# Patient Record
Sex: MALE | Race: WHITE | Employment: OTHER | URBAN - METROPOLITAN AREA
[De-identification: names, ages, dates, MRNs, and addresses within clinical notes are randomized per-mention and may not be internally consistent; named-entity substitution may affect disease eponyms.]

---

## 2019-06-07 ENCOUNTER — PREP FOR PROCEDURE (OUTPATIENT)
Dept: SURGERY | Age: 69
End: 2019-06-07

## 2019-06-07 RX ORDER — SODIUM CHLORIDE 9 MG/ML
INJECTION, SOLUTION INTRAVENOUS CONTINUOUS
Status: CANCELLED | OUTPATIENT
Start: 2019-06-07

## 2019-06-07 RX ORDER — SODIUM CHLORIDE 0.9 % (FLUSH) 0.9 %
10 SYRINGE (ML) INJECTION EVERY 12 HOURS SCHEDULED
Status: CANCELLED | OUTPATIENT
Start: 2019-06-07

## 2019-06-10 ENCOUNTER — HOSPITAL ENCOUNTER (OUTPATIENT)
Age: 69
Setting detail: OUTPATIENT SURGERY
Discharge: HOME | End: 2019-06-10
Attending: SURGERY | Admitting: SURGERY
Payer: MEDICARE

## 2019-06-10 VITALS
SYSTOLIC BLOOD PRESSURE: 115 MMHG | OXYGEN SATURATION: 97 % | HEART RATE: 57 BPM | WEIGHT: 219 LBS | TEMPERATURE: 98 F | HEIGHT: 71 IN | RESPIRATION RATE: 16 BRPM | DIASTOLIC BLOOD PRESSURE: 62 MMHG | BODY MASS INDEX: 30.66 KG/M2

## 2019-06-10 LAB
HCT VFR BLD CALC: 39.1 % (ref 37–54)
HEMOGLOBIN: 13.6 G/DL (ref 12.5–16.5)
IMMATURE RETIC FRACT: 6.5 % (ref 2.3–13.4)
IRON SATURATION: 33 % (ref 20–55)
IRON: 106 MCG/DL (ref 59–158)
MCH RBC QN AUTO: 32.5 PG (ref 26–35)
MCHC RBC AUTO-ENTMCNC: 34.8 % (ref 32–34.5)
MCV RBC AUTO: 93.3 FL (ref 80–99.9)
PDW BLD-RTO: 13.2 FL (ref 11.5–15)
PLATELET # BLD: 212 E9/L (ref 130–450)
PMV BLD AUTO: 10.2 FL (ref 7–12)
RBC # BLD: 4.19 E12/L (ref 3.8–5.8)
RETIC HGB EQUIVALENT: 38.1 PG (ref 28.2–36.6)
RETICULOCYTE ABSOLUTE COUNT: 0.07 E12/L
RETICULOCYTE COUNT PCT: 1.6 % (ref 0.4–1.9)
TOTAL IRON BINDING CAPACITY: 325 MCG/DL (ref 250–450)
WBC # BLD: 6.7 E9/L (ref 4.5–11.5)

## 2019-06-10 PROCEDURE — 83540 ASSAY OF IRON: CPT

## 2019-06-10 PROCEDURE — 6360000002 HC RX W HCPCS: Performed by: SURGERY

## 2019-06-10 PROCEDURE — 99152 MOD SED SAME PHYS/QHP 5/>YRS: CPT | Performed by: SURGERY

## 2019-06-10 PROCEDURE — 99153 MOD SED SAME PHYS/QHP EA: CPT | Performed by: SURGERY

## 2019-06-10 PROCEDURE — 7100000011 HC PHASE II RECOVERY - ADDTL 15 MIN: Performed by: SURGERY

## 2019-06-10 PROCEDURE — 85045 AUTOMATED RETICULOCYTE COUNT: CPT

## 2019-06-10 PROCEDURE — 2580000003 HC RX 258: Performed by: SURGERY

## 2019-06-10 PROCEDURE — 2709999900 HC NON-CHARGEABLE SUPPLY: Performed by: SURGERY

## 2019-06-10 PROCEDURE — 85027 COMPLETE CBC AUTOMATED: CPT

## 2019-06-10 PROCEDURE — 83550 IRON BINDING TEST: CPT

## 2019-06-10 PROCEDURE — 3609012400 HC EGD TRANSORAL BIOPSY SINGLE/MULTIPLE: Performed by: SURGERY

## 2019-06-10 PROCEDURE — 36415 COLL VENOUS BLD VENIPUNCTURE: CPT

## 2019-06-10 PROCEDURE — 88305 TISSUE EXAM BY PATHOLOGIST: CPT

## 2019-06-10 PROCEDURE — 7100000010 HC PHASE II RECOVERY - FIRST 15 MIN: Performed by: SURGERY

## 2019-06-10 PROCEDURE — 88342 IMHCHEM/IMCYTCHM 1ST ANTB: CPT

## 2019-06-10 RX ORDER — MIDAZOLAM HYDROCHLORIDE 1 MG/ML
INJECTION INTRAMUSCULAR; INTRAVENOUS PRN
Status: DISCONTINUED | OUTPATIENT
Start: 2019-06-10 | End: 2019-06-10 | Stop reason: ALTCHOICE

## 2019-06-10 RX ORDER — SODIUM CHLORIDE 9 MG/ML
INJECTION, SOLUTION INTRAVENOUS CONTINUOUS
Status: DISCONTINUED | OUTPATIENT
Start: 2019-06-10 | End: 2019-06-10 | Stop reason: HOSPADM

## 2019-06-10 RX ORDER — SODIUM CHLORIDE 0.9 % (FLUSH) 0.9 %
10 SYRINGE (ML) INJECTION EVERY 12 HOURS SCHEDULED
Status: DISCONTINUED | OUTPATIENT
Start: 2019-06-10 | End: 2019-06-10 | Stop reason: HOSPADM

## 2019-06-10 RX ORDER — 0.9 % SODIUM CHLORIDE 0.9 %
10 VIAL (ML) INJECTION PRN
Status: DISCONTINUED | OUTPATIENT
Start: 2019-06-10 | End: 2019-06-10 | Stop reason: HOSPADM

## 2019-06-10 RX ORDER — MEPERIDINE HYDROCHLORIDE 50 MG/ML
INJECTION INTRAMUSCULAR; INTRAVENOUS; SUBCUTANEOUS PRN
Status: DISCONTINUED | OUTPATIENT
Start: 2019-06-10 | End: 2019-06-10 | Stop reason: ALTCHOICE

## 2019-06-10 RX ADMIN — SODIUM CHLORIDE: 9 INJECTION, SOLUTION INTRAVENOUS at 09:48

## 2019-06-10 ASSESSMENT — PAIN - FUNCTIONAL ASSESSMENT: PAIN_FUNCTIONAL_ASSESSMENT: 0-10

## 2019-06-10 ASSESSMENT — PAIN SCALES - GENERAL
PAINLEVEL_OUTOF10: 0

## 2019-06-10 NOTE — BRIEF OP NOTE
Brief Postoperative Note  ______________________________________________________________    Patient: Nirav Nichols  YOB: 1950  MRN: [id number]  Date of Procedure: 6/10/2019    Pre-Op Diagnosis: REFLUX    Post-Op Diagnosis: Same       Procedure(s):  EGD BIOPSY    Anesthesia: Anesthesia type not filed in the log.     Surgeon(s):  Hubert Cm MD    Assistant: Anai Krause MD FACS    Estimated Blood Loss (mL): less than 50     Complications: None    Specimens:   ID Type Source Tests Collected by Time Destination   A : stomach antrum biopsy Tissue Stomach SURGICAL PATHOLOGY Hubert Cm MD 6/10/2019 1027        Implants:  * No implants in log *      Drains: * No LDAs found *    Findings: mild gastritis with duodenitis    Hubert Cm MD  Date: 6/10/2019  Time: 10:31 AM

## 2019-06-10 NOTE — PRE SEDATION
mouth daily. Yes Historical Provider, MD   vitamin E 1000 UNITS capsule Take 1,000 Units by mouth daily. Yes Historical Provider, MD     Coumadin Use Last 7 Days:  no  Antiplatelet drug therapy use last 7 days: no  Other anticoagulant use last 7 days: no  Additional Medication Information:  none      Pre-Sedation Documentation and Exam:   I have personally completed a history, physical exam & review of systems for this patient (see notes).     Mallampati Airway Assessment:  Mallampati Class III - (soft palate & base of uvula are visible)    Prior History of Anesthesia Complications:   none    ASA Classification:  Class 3 - A patient with severe systemic disease that limits activity but is not incapacitating    Sedation/ Anesthesia Plan:   intravenous sedation    Medications Planned:   midazolam (Versed)  intravenously    Patient is an appropriate candidate for plan of sedation: yes    Electronically signed by Kendra Hutchins MD on 6/10/2019 at 10:29 AM

## 2019-07-23 ENCOUNTER — HOSPITAL ENCOUNTER (OUTPATIENT)
Age: 69
Discharge: HOME | End: 2019-07-25
Payer: MEDICARE

## 2019-07-23 DIAGNOSIS — E78.00 PURE HYPERCHOLESTEROLEMIA: ICD-10-CM

## 2019-07-23 LAB
ALBUMIN SERPL-MCNC: 4.6 G/DL (ref 3.5–5.2)
ALP BLD-CCNC: 67 U/L (ref 40–129)
ALT SERPL-CCNC: 21 U/L (ref 0–40)
ANION GAP SERPL CALCULATED.3IONS-SCNC: 21 MMOL/L (ref 7–16)
AST SERPL-CCNC: 33 U/L (ref 0–39)
BILIRUB SERPL-MCNC: 0.5 MG/DL (ref 0–1.2)
BUN BLDV-MCNC: 13 MG/DL (ref 8–23)
CALCIUM SERPL-MCNC: 10.6 MG/DL (ref 8.6–10.2)
CHLORIDE BLD-SCNC: 100 MMOL/L (ref 98–107)
CHOLESTEROL, TOTAL: 221 MG/DL (ref 0–199)
CO2: 18 MMOL/L (ref 22–29)
CREAT SERPL-MCNC: 1 MG/DL (ref 0.7–1.2)
GFR AFRICAN AMERICAN: >60
GFR NON-AFRICAN AMERICAN: >60 ML/MIN/1.73
GLUCOSE BLD-MCNC: 94 MG/DL (ref 74–99)
HDLC SERPL-MCNC: 68 MG/DL
LDL CHOLESTEROL CALCULATED: 136 MG/DL (ref 0–99)
POTASSIUM SERPL-SCNC: 4.8 MMOL/L (ref 3.5–5)
SODIUM BLD-SCNC: 139 MMOL/L (ref 132–146)
TOTAL PROTEIN: 8.1 G/DL (ref 6.4–8.3)
TRIGL SERPL-MCNC: 83 MG/DL (ref 0–149)
VLDLC SERPL CALC-MCNC: 17 MG/DL

## 2019-07-23 PROCEDURE — 80053 COMPREHEN METABOLIC PANEL: CPT

## 2019-07-23 PROCEDURE — 80061 LIPID PANEL: CPT

## 2019-07-24 PROBLEM — E78.5 HYPERLIPIDEMIA: Status: ACTIVE | Noted: 2019-07-24

## 2020-08-03 ENCOUNTER — HOSPITAL ENCOUNTER (OUTPATIENT)
Age: 70
Discharge: HOME | End: 2020-08-05
Payer: MEDICARE

## 2020-08-03 LAB
ALBUMIN SERPL-MCNC: 4.3 G/DL (ref 3.5–5.2)
ALP BLD-CCNC: 63 U/L (ref 40–129)
ALT SERPL-CCNC: 19 U/L (ref 0–40)
ANION GAP SERPL CALCULATED.3IONS-SCNC: 17 MMOL/L (ref 7–16)
AST SERPL-CCNC: 31 U/L (ref 0–39)
BILIRUB SERPL-MCNC: 0.4 MG/DL (ref 0–1.2)
BUN BLDV-MCNC: 12 MG/DL (ref 8–23)
CALCIUM SERPL-MCNC: 9.9 MG/DL (ref 8.6–10.2)
CHLORIDE BLD-SCNC: 100 MMOL/L (ref 98–107)
CHOLESTEROL, TOTAL: 206 MG/DL (ref 0–199)
CO2: 22 MMOL/L (ref 22–29)
CREAT SERPL-MCNC: 1.1 MG/DL (ref 0.7–1.2)
GFR AFRICAN AMERICAN: >60
GFR NON-AFRICAN AMERICAN: >60 ML/MIN/1.73
GLUCOSE BLD-MCNC: 89 MG/DL (ref 74–99)
HDLC SERPL-MCNC: 73 MG/DL
LDL CHOLESTEROL CALCULATED: 122 MG/DL (ref 0–99)
POTASSIUM SERPL-SCNC: 4.5 MMOL/L (ref 3.5–5)
SODIUM BLD-SCNC: 139 MMOL/L (ref 132–146)
TOTAL PROTEIN: 7.6 G/DL (ref 6.4–8.3)
TRIGL SERPL-MCNC: 57 MG/DL (ref 0–149)
VLDLC SERPL CALC-MCNC: 11 MG/DL

## 2020-08-03 PROCEDURE — G0103 PSA SCREENING: HCPCS

## 2020-08-03 PROCEDURE — 80061 LIPID PANEL: CPT

## 2020-08-03 PROCEDURE — 80053 COMPREHEN METABOLIC PANEL: CPT

## 2020-08-04 LAB — PROSTATE SPECIFIC ANTIGEN: 1.68 NG/ML (ref 0–4)

## 2021-03-13 ENCOUNTER — IMMUNIZATION (OUTPATIENT)
Dept: PRIMARY CARE CLINIC | Age: 71
End: 2021-03-13
Payer: MEDICARE

## 2021-03-13 PROCEDURE — 0011A COVID-19, MODERNA VACCINE 100MCG/0.5ML DOSE: CPT | Performed by: EMERGENCY MEDICINE

## 2021-03-13 PROCEDURE — 91301 COVID-19, MODERNA VACCINE 100MCG/0.5ML DOSE: CPT | Performed by: EMERGENCY MEDICINE

## 2021-04-14 ENCOUNTER — IMMUNIZATION (OUTPATIENT)
Dept: PRIMARY CARE CLINIC | Age: 71
End: 2021-04-14
Payer: MEDICARE

## 2021-04-14 PROCEDURE — 91301 COVID-19, MODERNA VACCINE 100MCG/0.5ML DOSE: CPT | Performed by: PHYSICIAN ASSISTANT

## 2021-04-14 PROCEDURE — 0012A COVID-19, MODERNA VACCINE 100MCG/0.5ML DOSE: CPT | Performed by: PHYSICIAN ASSISTANT

## 2021-08-04 DIAGNOSIS — E78.00 PURE HYPERCHOLESTEROLEMIA: ICD-10-CM

## 2021-08-04 LAB
ALBUMIN SERPL-MCNC: 4.3 G/DL (ref 3.5–5.2)
ALP BLD-CCNC: 69 U/L (ref 40–129)
ALT SERPL-CCNC: 16 U/L (ref 0–40)
ANION GAP SERPL CALCULATED.3IONS-SCNC: 11 MMOL/L (ref 7–16)
AST SERPL-CCNC: 28 U/L (ref 0–39)
BILIRUB SERPL-MCNC: 0.5 MG/DL (ref 0–1.2)
BUN BLDV-MCNC: 15 MG/DL (ref 6–23)
CALCIUM SERPL-MCNC: 10.5 MG/DL (ref 8.6–10.2)
CHLORIDE BLD-SCNC: 103 MMOL/L (ref 98–107)
CHOLESTEROL, TOTAL: 191 MG/DL (ref 0–199)
CO2: 24 MMOL/L (ref 22–29)
CREAT SERPL-MCNC: 1.1 MG/DL (ref 0.7–1.2)
GFR AFRICAN AMERICAN: >60
GFR NON-AFRICAN AMERICAN: >60 ML/MIN/1.73
GLUCOSE BLD-MCNC: 99 MG/DL (ref 74–99)
HDLC SERPL-MCNC: 67 MG/DL
LDL CHOLESTEROL CALCULATED: 113 MG/DL (ref 0–99)
POTASSIUM SERPL-SCNC: 4.4 MMOL/L (ref 3.5–5)
SODIUM BLD-SCNC: 138 MMOL/L (ref 132–146)
TOTAL PROTEIN: 7.9 G/DL (ref 6.4–8.3)
TRIGL SERPL-MCNC: 56 MG/DL (ref 0–149)
VLDLC SERPL CALC-MCNC: 11 MG/DL

## 2021-08-18 ENCOUNTER — HOSPITAL ENCOUNTER (OUTPATIENT)
Age: 71
Discharge: HOME | End: 2021-08-18
Payer: MEDICARE

## 2021-08-18 PROCEDURE — 82747 ASSAY OF FOLIC ACID RBC: CPT

## 2021-08-21 LAB
HCT VFR BLD CALC: 40.8 %
RBC FOLATE: NORMAL NG/ML

## 2021-10-24 ENCOUNTER — APPOINTMENT (OUTPATIENT)
Dept: CT IMAGING | Age: 71
End: 2021-10-24
Payer: MEDICARE

## 2021-10-24 ENCOUNTER — APPOINTMENT (OUTPATIENT)
Dept: GENERAL RADIOLOGY | Age: 71
End: 2021-10-24
Payer: MEDICARE

## 2021-10-24 ENCOUNTER — HOSPITAL ENCOUNTER (OUTPATIENT)
Age: 71
Setting detail: OBSERVATION
Discharge: HOME | End: 2021-10-26
Attending: STUDENT IN AN ORGANIZED HEALTH CARE EDUCATION/TRAINING PROGRAM | Admitting: INTERNAL MEDICINE
Payer: MEDICARE

## 2021-10-24 DIAGNOSIS — E87.1 HYPONATREMIA: ICD-10-CM

## 2021-10-24 DIAGNOSIS — R55 SYNCOPE AND COLLAPSE: Primary | ICD-10-CM

## 2021-10-24 LAB
ANION GAP SERPL CALCULATED.3IONS-SCNC: 16 MMOL/L (ref 7–16)
BACTERIA: NORMAL /HPF
BASOPHILS ABSOLUTE: 0.06 E9/L (ref 0–0.2)
BASOPHILS RELATIVE PERCENT: 0.8 % (ref 0–2)
BILIRUBIN URINE: NEGATIVE
BLOOD, URINE: ABNORMAL
BUN BLDV-MCNC: 12 MG/DL (ref 6–23)
CALCIUM SERPL-MCNC: 9.1 MG/DL (ref 8.6–10.2)
CHLORIDE BLD-SCNC: 87 MMOL/L (ref 98–107)
CLARITY: CLEAR
CO2: 19 MMOL/L (ref 22–29)
COLOR: YELLOW
CREAT SERPL-MCNC: 1 MG/DL (ref 0.7–1.2)
EOSINOPHILS ABSOLUTE: 0.19 E9/L (ref 0.05–0.5)
EOSINOPHILS RELATIVE PERCENT: 2.6 % (ref 0–6)
GFR AFRICAN AMERICAN: >60
GFR NON-AFRICAN AMERICAN: >60 ML/MIN/1.73
GLUCOSE BLD-MCNC: 92 MG/DL (ref 74–99)
GLUCOSE URINE: NEGATIVE MG/DL
HCT VFR BLD CALC: 34.4 % (ref 37–54)
HEMOGLOBIN: 12.4 G/DL (ref 12.5–16.5)
IMMATURE GRANULOCYTES #: 0.05 E9/L
IMMATURE GRANULOCYTES %: 0.7 % (ref 0–5)
KETONES, URINE: NEGATIVE MG/DL
LEUKOCYTE ESTERASE, URINE: NEGATIVE
LYMPHOCYTES ABSOLUTE: 1.61 E9/L (ref 1.5–4)
LYMPHOCYTES RELATIVE PERCENT: 21.9 % (ref 20–42)
MAGNESIUM: 1.7 MG/DL (ref 1.6–2.6)
MCH RBC QN AUTO: 32.6 PG (ref 26–35)
MCHC RBC AUTO-ENTMCNC: 36 % (ref 32–34.5)
MCV RBC AUTO: 90.5 FL (ref 80–99.9)
MONOCYTES ABSOLUTE: 0.51 E9/L (ref 0.1–0.95)
MONOCYTES RELATIVE PERCENT: 6.9 % (ref 2–12)
NEUTROPHILS ABSOLUTE: 4.93 E9/L (ref 1.8–7.3)
NEUTROPHILS RELATIVE PERCENT: 67.1 % (ref 43–80)
NITRITE, URINE: NEGATIVE
PDW BLD-RTO: 11.8 FL (ref 11.5–15)
PH UA: 5.5 (ref 5–9)
PLATELET # BLD: 221 E9/L (ref 130–450)
PMV BLD AUTO: 9.2 FL (ref 7–12)
POTASSIUM REFLEX MAGNESIUM: 3.7 MMOL/L (ref 3.5–5)
PROTEIN UA: NEGATIVE MG/DL
RBC # BLD: 3.8 E12/L (ref 3.8–5.8)
RBC UA: NORMAL /HPF (ref 0–2)
SODIUM BLD-SCNC: 122 MMOL/L (ref 132–146)
SPECIFIC GRAVITY UA: 1.02 (ref 1–1.03)
TROPONIN, HIGH SENSITIVITY: 22 NG/L (ref 0–11)
UROBILINOGEN, URINE: 0.2 E.U./DL
WBC # BLD: 7.4 E9/L (ref 4.5–11.5)
WBC UA: NORMAL /HPF (ref 0–5)

## 2021-10-24 PROCEDURE — 80048 BASIC METABOLIC PNL TOTAL CA: CPT

## 2021-10-24 PROCEDURE — 93005 ELECTROCARDIOGRAM TRACING: CPT | Performed by: STUDENT IN AN ORGANIZED HEALTH CARE EDUCATION/TRAINING PROGRAM

## 2021-10-24 PROCEDURE — 71045 X-RAY EXAM CHEST 1 VIEW: CPT

## 2021-10-24 PROCEDURE — 81001 URINALYSIS AUTO W/SCOPE: CPT

## 2021-10-24 PROCEDURE — 99284 EMERGENCY DEPT VISIT MOD MDM: CPT

## 2021-10-24 PROCEDURE — 85025 COMPLETE CBC W/AUTO DIFF WBC: CPT

## 2021-10-24 PROCEDURE — 70450 CT HEAD/BRAIN W/O DYE: CPT

## 2021-10-24 PROCEDURE — 83735 ASSAY OF MAGNESIUM: CPT

## 2021-10-24 PROCEDURE — 83935 ASSAY OF URINE OSMOLALITY: CPT

## 2021-10-24 PROCEDURE — 36415 COLL VENOUS BLD VENIPUNCTURE: CPT

## 2021-10-24 PROCEDURE — 80307 DRUG TEST PRSMV CHEM ANLYZR: CPT

## 2021-10-24 PROCEDURE — 84484 ASSAY OF TROPONIN QUANT: CPT

## 2021-10-24 NOTE — ED NOTES
Bed: 24  Expected date:   Expected time:   Means of arrival:   Comments:  TULIO Jones RN  10/24/21 1930

## 2021-10-25 PROBLEM — R55 SYNCOPE AND COLLAPSE: Status: ACTIVE | Noted: 2021-10-25

## 2021-10-25 LAB
AMPHETAMINE SCREEN, URINE: NOT DETECTED
ANION GAP SERPL CALCULATED.3IONS-SCNC: 14 MMOL/L (ref 7–16)
ANION GAP SERPL CALCULATED.3IONS-SCNC: 17 MMOL/L (ref 7–16)
BARBITURATE SCREEN URINE: NOT DETECTED
BENZODIAZEPINE SCREEN, URINE: NOT DETECTED
BUN BLDV-MCNC: 11 MG/DL (ref 6–23)
BUN BLDV-MCNC: 14 MG/DL (ref 6–23)
CALCIUM SERPL-MCNC: 9.1 MG/DL (ref 8.6–10.2)
CALCIUM SERPL-MCNC: 9.6 MG/DL (ref 8.6–10.2)
CANNABINOID SCREEN URINE: NOT DETECTED
CHLORIDE BLD-SCNC: 92 MMOL/L (ref 98–107)
CHLORIDE BLD-SCNC: 93 MMOL/L (ref 98–107)
CHLORIDE URINE RANDOM: <20 MMOL/L
CO2: 18 MMOL/L (ref 22–29)
CO2: 21 MMOL/L (ref 22–29)
COCAINE METABOLITE SCREEN URINE: NOT DETECTED
CREAT SERPL-MCNC: 0.9 MG/DL (ref 0.7–1.2)
CREAT SERPL-MCNC: 1.1 MG/DL (ref 0.7–1.2)
CREATININE URINE: 36 MG/DL (ref 40–278)
EKG ATRIAL RATE: 68 BPM
EKG P AXIS: 30 DEGREES
EKG P-R INTERVAL: 152 MS
EKG Q-T INTERVAL: 428 MS
EKG QRS DURATION: 106 MS
EKG QTC CALCULATION (BAZETT): 455 MS
EKG R AXIS: 71 DEGREES
EKG T AXIS: 15 DEGREES
EKG VENTRICULAR RATE: 68 BPM
FENTANYL SCREEN, URINE: NOT DETECTED
GFR AFRICAN AMERICAN: >60
GFR AFRICAN AMERICAN: >60
GFR NON-AFRICAN AMERICAN: >60 ML/MIN/1.73
GFR NON-AFRICAN AMERICAN: >60 ML/MIN/1.73
GLUCOSE BLD-MCNC: 172 MG/DL (ref 74–99)
GLUCOSE BLD-MCNC: 177 MG/DL (ref 74–99)
Lab: NORMAL
METHADONE SCREEN, URINE: NOT DETECTED
OPIATE SCREEN URINE: NOT DETECTED
OSMOLALITY URINE: 167 MOSM/KG (ref 300–900)
OSMOLALITY URINE: 302 MOSM/KG (ref 300–900)
OSMOLALITY: 276 MOSM/KG (ref 285–310)
OSMOLALITY: 281 MOSM/KG (ref 285–310)
OXYCODONE URINE: NOT DETECTED
PHENCYCLIDINE SCREEN URINE: NOT DETECTED
POTASSIUM SERPL-SCNC: 4.1 MMOL/L (ref 3.5–5)
POTASSIUM SERPL-SCNC: 5.6 MMOL/L (ref 3.5–5)
POTASSIUM, UR: 21.7 MMOL/L
SODIUM BLD-SCNC: 127 MMOL/L (ref 132–146)
SODIUM BLD-SCNC: 128 MMOL/L (ref 132–146)
SODIUM URINE: 21 MMOL/L
T4 FREE: 1.15 NG/DL (ref 0.93–1.7)
TROPONIN, HIGH SENSITIVITY: 18 NG/L (ref 0–11)
TSH SERPL DL<=0.05 MIU/L-ACNC: 1.71 UIU/ML (ref 0.27–4.2)
URIC ACID, SERUM: 7.7 MG/DL (ref 3.4–7)

## 2021-10-25 PROCEDURE — 84484 ASSAY OF TROPONIN QUANT: CPT

## 2021-10-25 PROCEDURE — 36415 COLL VENOUS BLD VENIPUNCTURE: CPT

## 2021-10-25 PROCEDURE — G0378 HOSPITAL OBSERVATION PER HR: HCPCS

## 2021-10-25 PROCEDURE — 84439 ASSAY OF FREE THYROXINE: CPT

## 2021-10-25 PROCEDURE — 6370000000 HC RX 637 (ALT 250 FOR IP): Performed by: INTERNAL MEDICINE

## 2021-10-25 PROCEDURE — 83935 ASSAY OF URINE OSMOLALITY: CPT

## 2021-10-25 PROCEDURE — 80048 BASIC METABOLIC PNL TOTAL CA: CPT

## 2021-10-25 PROCEDURE — 84443 ASSAY THYROID STIM HORMONE: CPT

## 2021-10-25 PROCEDURE — 83930 ASSAY OF BLOOD OSMOLALITY: CPT

## 2021-10-25 PROCEDURE — 93010 ELECTROCARDIOGRAM REPORT: CPT | Performed by: INTERNAL MEDICINE

## 2021-10-25 PROCEDURE — 82570 ASSAY OF URINE CREATININE: CPT

## 2021-10-25 PROCEDURE — 82436 ASSAY OF URINE CHLORIDE: CPT

## 2021-10-25 PROCEDURE — 84300 ASSAY OF URINE SODIUM: CPT

## 2021-10-25 PROCEDURE — 84550 ASSAY OF BLOOD/URIC ACID: CPT

## 2021-10-25 PROCEDURE — 84133 ASSAY OF URINE POTASSIUM: CPT

## 2021-10-25 RX ORDER — SUCRALFATE 1 G/1
1 TABLET ORAL 2 TIMES DAILY
Status: DISCONTINUED | OUTPATIENT
Start: 2021-10-25 | End: 2021-10-26 | Stop reason: HOSPADM

## 2021-10-25 RX ORDER — SODIUM CHLORIDE 9 MG/ML
INJECTION, SOLUTION INTRAVENOUS CONTINUOUS
Status: DISCONTINUED | OUTPATIENT
Start: 2021-10-25 | End: 2021-10-25

## 2021-10-25 RX ORDER — MULTIVIT WITH MINERALS/LUTEIN
1000 TABLET ORAL DAILY
Status: DISCONTINUED | OUTPATIENT
Start: 2021-10-25 | End: 2021-10-26 | Stop reason: HOSPADM

## 2021-10-25 RX ORDER — SODIUM BICARBONATE 650 MG/1
650 TABLET ORAL 2 TIMES DAILY
Status: DISCONTINUED | OUTPATIENT
Start: 2021-10-25 | End: 2021-10-26 | Stop reason: HOSPADM

## 2021-10-25 RX ORDER — M-VIT,TX,IRON,MINS/CALC/FOLIC 27MG-0.4MG
1 TABLET ORAL DAILY
Status: DISCONTINUED | OUTPATIENT
Start: 2021-10-25 | End: 2021-10-26 | Stop reason: HOSPADM

## 2021-10-25 RX ORDER — CHLORAL HYDRATE 500 MG
1000 CAPSULE ORAL DAILY
Status: DISCONTINUED | OUTPATIENT
Start: 2021-10-25 | End: 2021-10-25 | Stop reason: CLARIF

## 2021-10-25 RX ORDER — LISINOPRIL 20 MG/1
20 TABLET ORAL DAILY
Status: DISCONTINUED | OUTPATIENT
Start: 2021-10-25 | End: 2021-10-26 | Stop reason: HOSPADM

## 2021-10-25 RX ADMIN — SUCRALFATE 1 G: 1 TABLET ORAL at 17:35

## 2021-10-25 ASSESSMENT — PAIN SCALES - GENERAL
PAINLEVEL_OUTOF10: 0

## 2021-10-25 NOTE — CONSULTS
Nephrology Consult  [facility]    CC:   Hyponatremia     HPI:   Tameka Vega is a 66year old male we were asked to see regarding hyponatremia. His serum sodium was 122 at 2030 on 10/24 was repeated at 1348 and was 127. He had an order for IVF but this was not hanging upon my exam. He presented to the ED for syncope on 10/24/21. He had been on lisinopril/HCTZ prior to admission, the HCTZ has been held. He denies any nausea, vomiting of diarrhea PTA. He is feeling well currently, his repeat K was 5.6, hemolyzed specimen. Repeat has been ordered. He is awake, alert and oriented in the bed, no distress. PMH:    Past Medical History:   Diagnosis Date    Diverticulitis     GERD (gastroesophageal reflux disease)     High cholesterol     Hypertension     Rosacea        Patient Active Problem List   Diagnosis    Class 1 obesity due to excess calories without serious comorbidity with body mass index (BMI) of 32.0 to 32.9 in adult    Benign essential hypertension    GERD (gastroesophageal reflux disease)    Rosacea    Hyperlipidemia    Syncope and collapse       Meds:     vitamin E  1,000 Units Oral Daily    therapeutic multivitamin-minerals  1 tablet Oral Daily    sucralfate  1 g Oral BID    lisinopril  20 mg Oral Daily        sodium chloride         Meds prn:     perflutren lipid microspheres    Meds prior to admission:     No current facility-administered medications on file prior to encounter. Current Outpatient Medications on File Prior to Encounter   Medication Sig Dispense Refill    lisinopril-hydroCHLOROthiazide (PRINZIDE;ZESTORETIC) 20-12.5 MG per tablet Take 1 tablet by mouth daily 90 tablet 1    sucralfate (CARAFATE) 1 GM tablet Take 1 tablet by mouth 2 times daily 180 tablet 1       Allergies:    Patient has no known allergies. Social History:     reports that he quit smoking about 37 years ago. He has a 25.00 pack-year smoking history.  He has never used smokeless tobacco. He reports current alcohol use of about 8.0 standard drinks of alcohol per week. He reports that he does not use drugs. Family History:         Problem Relation Age of Onset    Other Mother         bowel obstruction    No Known Problems Father     No Known Problems Sister     Atrial Fibrillation Brother        ROS:     All pertinent + discussed in HPI  All other sx negative     Physical Exam:      Patient Vitals for the past 24 hrs:   BP Temp Temp src Pulse Resp SpO2   10/25/21 1345 139/79 98.7 °F (37.1 °C) Temporal 78 18 98 %   10/25/21 1042 132/75 98.2 °F (36.8 °C) Oral 72 14 96 %   10/25/21 0703 130/69   63 16 98 %   10/25/21 0200 127/65   71 16 99 %   10/24/21 2315 (!) 135/104   70     10/24/21 1931 133/71 98.4 °F (36.9 °C)  65 18 98 %       No intake or output data in the 24 hours ending 10/25/21 1520    Constitutional: Patient in no acute distress   Head: normocephalic, atraumatic   Neck: supple, no jvd  Cardiovascular: S1 S2 no S3 or rub  Respiratory: Clear and equal bilaterally  Gastrointestinal: soft, nontender, nondistended, no hepatosplenomegaly  Ext: no edema   Neuro: awake, alert and oriented  Skin: dry, no rash       Data:    Recent Labs     10/24/21  2030   WBC 7.4   HGB 12.4*   HCT 34.4*   MCV 90.5          Recent Labs     10/24/21  2030   *   K 3.7   CL 87*   CO2 19*   CREATININE 1.0   BUN 12   LABGLOM >60   GLUCOSE 92   CALCIUM 9.1   MG 1.7       Vit D, 25-Hydroxy   Date Value Ref Range Status   07/11/2016 34  Final       PTH   Date Value Ref Range Status   08/16/2021 32 15 - 65 pg/mL Final       No results for input(s): ALT, AST, ALKPHOS, BILITOT, BILIDIR in the last 72 hours. No results for input(s): LABALBU in the last 72 hours.     Iron   Date Value Ref Range Status   08/16/2021 123 59 - 158 mcg/dL Final     TIBC   Date Value Ref Range Status   08/16/2021 318 250 - 450 mcg/dL Final       Vitamin B-12   Date Value Ref Range Status   08/16/2021 478 211 - 946 pg/mL Final       Folate   Date Value Ref Range Status   08/16/2021 19.8 4.8 - 24.2 ng/mL Final         Lab Results   Component Value Date    COLORU Yellow 10/24/2021    NITRU Negative 10/24/2021    GLUCOSEU Negative 10/24/2021    KETUA Negative 10/24/2021    UROBILINOGEN 0.2 10/24/2021    BILIRUBINUR Negative 10/24/2021       Lab Results   Component Value Date/Time    OSMOU 302 10/24/2021 11:30 PM       No components found for: URIC    No results found for: LIPIDPAN      Assessment and Plan:    1. Hyponatremia-   Possibly hypovolemic  Was on lisinopril/HCTZ PTA  The HCTZ being held as this is possibly the cause of the hyponatremia   Na at admission 122-->127 with IVF   Correction of 5 mmol in 18 hours- acceptable  Serum osmo was 276 this am, recheck. Check TSH, T4, uric acid and am cortisol    2. Hyperkalemia-   K was 3.7-->5. 6  Spec hemolyzed- redraw  Treat if needed     3. Metabolic acidosis-  Without GAYE  Will start po HCO3 and follow     4. Mild anemia-  Will follow   Check iron studies, B-12 and folate    5. Hypertension-  Continue on lisinopril  At goal<130/80      EMILY Higuera - CNP    Patient seen and examined all key components of the physical performed independently , case discussed with NP, all pertinent labs and radiologic tests personally reviewed agree with above. Hypotonic, hyponatremia; clinically patient appears hypovolemic; cannot r/o underlying SIADH;  Na level improved with IVF with NS; appropriate tests ordered; continue to monitor     Oliver Crawford MD

## 2021-10-25 NOTE — H&P
[facility]  History and Physical      CHIEF COMPLAINT: Syncope    Reason for Admission: Hyponatremia, syncope    History Obtained From: Patient    PCP :  Yani Munguia MD    [address] [facility] / [place] [address]      HISTORY OF PRESENT ILLNESS:      The patient is a 66 y.o. male presents to the emergency room because of episode of syncope. The episode was witnessed by his wife. He was on his feet walking when this happened. There was no premonitory symptoms. There was no headache chest pain etc.  He was feeling okay prior to that episode. He also reports to such episodes in the recent past the last one was approximately 6 weeks ago. There have been no new medication changes. In the emergency room patient was noted to be hyponatremic. He was feeling okay at the time of my examination. Past Medical History:        Diagnosis Date    Diverticulitis     GERD (gastroesophageal reflux disease)     High cholesterol     Hypertension     Rosacea      Past Surgical History:        Procedure Laterality Date    COLONOSCOPY  7/2/2012    polyps removed    COLONOSCOPY  08/04/2014    ENDOSCOPY, COLON, DIAGNOSTIC  11/07/2016    OTHER SURGICAL HISTORY  3/10/14    upper GI    UPPER GASTROINTESTINAL ENDOSCOPY N/A 6/10/2019    EGD BIOPSY performed by Shavonne Munson MD at [address] [place] [address]         Medications Prior to Admission:    Medications Prior to Admission: lisinopril-hydroCHLOROthiazide (PRINZIDE;ZESTORETIC) 20-12.5 MG per tablet, Take 1 tablet by mouth daily  sucralfate (CARAFATE) 1 GM tablet, Take 1 tablet by mouth 2 times daily    Allergies:  Patient has no known allergies.     Social History:   Social History     Socioeconomic History    Marital status:      Spouse name: Not on file    Number of children: 1    Years of education: Not on file    Highest education level: Not on file   Occupational History    Occupation: retired   Tobacco Use    Smoking status: Former Smoker     Packs/day: 1.00     Years: 25.00     Pack years: 25.00     Quit date:      Years since quittin.8    Smokeless tobacco: Never Used   Vaping Use    Vaping Use: Never used   Substance and Sexual Activity    Alcohol use: Yes     Alcohol/week: 8.0 standard drinks     Types: 8 Cans of beer per week    Drug use: No    Sexual activity: Not on file   Other Topics Concern    Not on file   Social History Narrative    Not on file     Social Determinants of Health     Financial Resource Strain:     Difficulty of Paying Living Expenses:    Food Insecurity:     Worried About Running Out of Food in the Last Year:     [address] in the Last Year:    Transportation Needs:     Lack of Transportation (Medical):      Lack of Transportation (Non-Medical):    Physical Activity:     Days of Exercise per Week:     Minutes of Exercise per Session:    Stress:     Feeling of Stress :    Social Connections:     Frequency of Communication with Friends and Family:     Frequency of Social Gatherings with Friends and Family:     Attends Gnosticist Services:     Active Member of Clubs or Organizations:     Attends Club or Organization Meetings:     Marital Status:    Intimate Partner Violence:     Fear of Current or Ex-Partner:     Emotionally Abused:     Physically Abused:     Sexually Abused:          Family History:       Problem Relation Age of Onset    Other Mother         bowel obstruction    No Known Problems Father     No Known Problems Sister     Atrial Fibrillation Brother        REVIEW OF SYSTEMS:    General ROS: negative  Hematological and Lymphatic ROS: negative  Endocrine ROS: negative  Respiratory ROS: no cough,  wheezing  or shortness of breath,   Cardiovascular ROS: Positive for syncope  Gastrointestinal ROS: no abdominal pain, change in bowel habits, or black or bloody stools  Genito-Urinary ROS: no dysuria, trouble voiding, or hematuria  Neurological ROS: no TIA or stroke symptoms  negative    Vitals:  /79   Pulse 78   Temp 98.7 °F (37.1 °C) (Temporal)   Resp 18   SpO2 98%     PHYSICAL EXAM:  General:  Awake, alert, oriented X 3. Well developed, well nourished, well groomed. No apparent distress. HEENT:  Normocephalic, atraumatic. Pupils equal, round, reactive to light. No scleral icterus. No conjunctival injection. Neck:  Supple, no carotid bruits  Heart:  RRR,   Lungs:  CTA bilaterally, bilat symmetrical expansion, no wheeze, rales, or rhonchi  Abdomen:   Bowel sounds present, soft, nontender, no masses, no organomegaly, no peritoneal signs  Extremities:  No clubbing, cyanosis, or edema  Skin:  Warm and dry, no open lesions or rash  Neuro:  Cranial nerves 2-12 intact, no focal deficits      DATA:     Recent Results (from the past 24 hour(s))   EKG 12 Lead    Collection Time: 10/24/21  8:13 PM   Result Value Ref Range    Ventricular Rate 68 BPM    Atrial Rate 68 BPM    P-R Interval 152 ms    QRS Duration 106 ms    Q-T Interval 428 ms    QTc Calculation (Bazett) 455 ms    P Axis 30 degrees    R Axis 71 degrees    T Axis 15 degrees   CBC Auto Differential    Collection Time: 10/24/21  8:30 PM   Result Value Ref Range    WBC 7.4 4.5 - 11.5 E9/L    RBC 3.80 3.80 - 5.80 E12/L    Hemoglobin 12.4 (L) 12.5 - 16.5 g/dL    Hematocrit 34.4 (L) 37.0 - 54.0 %    MCV 90.5 80.0 - 99.9 fL    MCH 32.6 26.0 - 35.0 pg    MCHC 36.0 (H) 32.0 - 34.5 %    RDW 11.8 11.5 - 15.0 fL    Platelets 921 065 - 827 E9/L    MPV 9.2 7.0 - 12.0 fL    Neutrophils % 67.1 43.0 - 80.0 %    Immature Granulocytes % 0.7 0.0 - 5.0 %    Lymphocytes % 21.9 20.0 - 42.0 %    Monocytes % 6.9 2.0 - 12.0 %    Eosinophils % 2.6 0.0 - 6.0 %    Basophils % 0.8 0.0 - 2.0 %    Neutrophils Absolute 4.93 1.80 - 7.30 E9/L    Immature Granulocytes # 0.05 E9/L    Lymphocytes Absolute 1.61 1.50 - 4.00 E9/L    Monocytes Absolute 0.51 0.10 - 0.95 E9/L    Eosinophils Absolute 0.19 0.05 - 0.50 E9/L    Basophils Absolute 25 ng/mL    Methadone Screen, Urine NOT DETECTED Negative <300 ng/mL    Oxycodone Urine NOT DETECTED Negative <100 ng/mL    FENTANYL SCREEN, URINE NOT DETECTED Negative <1 ng/mL    Drug Screen Comment: see below    OSMOLALITY, URINE    Collection Time: 10/24/21 11:30 PM   Result Value Ref Range    Osmolality, Ur 302 300 - 900 mOsm/kg   Troponin    Collection Time: 10/25/21  6:03 AM   Result Value Ref Range    Troponin, High Sensitivity 18 (H) 0 - 11 ng/L   OSMOLALITY, SERUM    Collection Time: 10/25/21  6:03 AM   Result Value Ref Range    Osmolality 276 (L) 285 - 310 mOsm/Kg       CT Head WO Contrast   Final Result   No acute intracranial abnormality. Bilateral maxillary and ethmoid sinusitis. XR CHEST PORTABLE   Final Result   No acute process. ASSESSMENT :      Active Problems:    Syncope and collapse  Resolved Problems:    * No resolved hospital problems. *    Hyponatremia  Hypertension by history  Rosacea by history  GERD by history    Plan :    Urine drug screen was negative  High-sensitivity troponin was mildly elevated  Second set was lower than first set  IV hydration  Cardiology to see  Nephrology to see as well  Patient's lisinopril hydrochlorothiazide was changed to plain lisinopril  Follow sodium      Electronically signed by Krish Ivey MD on 10/25/2021 at 3:00 PM    NOTE: This report was transcribed using voice recognition software.  Every effort was made to ensure accuracy; however, inadvertent transcription errors may be present

## 2021-10-25 NOTE — CONSULTS
[facility] [address] [place] [address]    Name: Burgess Linton    Age: 66 y.o. Date of Admission: 10/24/2021  7:30 PM    Date of Service: 10/25/2021    Reason for Consultation: syncope    Referring Physician: Dr Dar Cline  Primary Care Physician: Fariba Lagunas MD    History of Present Illness: The patient is a 66y.o. year old male with hypertension, no other cardiac history who states was in his usual state of health until yesterday evening. He was in the living room on the couch, got up to walk into the kitchen, became lightheaded and passed out hitting his forehead on a chair. Wife came to his aide. He believes he was out 2-3 minutes, but when he awoke had no other lightheadedness, nausea, diaphoresis, chest pains or palpitations. States he had a good sized diner earlier, did not feel he was dehydrated and denies over drinking. Came to ED and found to have a sodium of 122. Only mildly orthostatic here. States 3-4 weeks ago had a similar incident of fainting, but did not seek medical attention. Past Medical History:   Past Medical History:   Diagnosis Date    Diverticulitis     GERD (gastroesophageal reflux disease)     High cholesterol     Hypertension     Rosacea        Review of Systems:   Constitutional: No fever, chills, sweats  Cardiac: As per HPI  Pulmonary: No cough, wheeze, hemoptysis  HEENT: No visual disturbances, difficult swallowing  GI: No nausea, vomiting, diarrhea, abdominal pain, rectal bleeding  : No dysuria or hematuria  Endocrine: No excessive thirst, heat or cold intolerance. Musculoskeletal: No joint pain or muscle aches.  No claudication  Skin: No skin breakdown or rashes  Neuro: No headache, confusion, or seizures  Psych: No depression, anxiety    Family History:  Family History   Problem Relation Age of Onset    Other Mother         bowel obstruction    No Known Problems Father     No Known Problems Sister     Atrial Fibrillation Brother Social History:  Social History     Socioeconomic History    Marital status:      Spouse name: Not on file    Number of children: 1    Years of education: Not on file    Highest education level: Not on file   Occupational History    Occupation: retired   Tobacco Use    Smoking status: Former Smoker     Packs/day: 1.00     Years: 25.00     Pack years: 25.00     Quit date:      Years since quittin.8    Smokeless tobacco: Never Used   Vaping Use    Vaping Use: Never used   Substance and Sexual Activity    Alcohol use: Yes     Alcohol/week: 8.0 standard drinks     Types: 8 Cans of beer per week    Drug use: No    Sexual activity: Not on file   Other Topics Concern    Not on file   Social History Narrative    Not on file     Social Determinants of Health     Financial Resource Strain:     Difficulty of Paying Living Expenses:    Food Insecurity:     Worried About Running Out of Food in the Last Year:     [address] in the Last Year:    Transportation Needs:     Lack of Transportation (Medical):  Lack of Transportation (Non-Medical):    Physical Activity:     Days of Exercise per Week:     Minutes of Exercise per Session:    Stress:     Feeling of Stress :    Social Connections:     Frequency of Communication with Friends and Family:     Frequency of Social Gatherings with Friends and Family:     Attends Denominational Services:     Active Member of Clubs or Organizations:     Attends Club or Organization Meetings:     Marital Status:    Intimate Partner Violence:     Fear of Current or Ex-Partner:     Emotionally Abused:     Physically Abused:     Sexually Abused: Allergies:  No Known Allergies    Home Medications:  Prior to Admission medications    Medication Sig Start Date End Date Taking?  Authorizing Provider   lisinopril-hydroCHLOROthiazide (PRINZIDE;ZESTORETIC) 20-12.5 MG per tablet Take 1 tablet by mouth daily 21  Krish Ivey MD sucralfate (CARAFATE) 1 GM tablet Take 1 tablet by mouth 2 times daily 7/13/17   Ramirez Blackburn MD   metroNIDAZOLE (METROGEL) 0.75 % gel Apply 1 Tube topically 2 times daily Apply topically 2 times daily. 7/12/17   Ramirez Blackburn MD   Multiple Vitamins-Minerals (THERAPEUTIC MULTIVITAMIN-MINERALS) tablet Take 1 tablet by mouth daily. Historical Provider, MD   Omega-3 Fatty Acids (FISH OIL) 1000 MG CAPS Take 1,000 mg by mouth daily. Historical Provider, MD   Methylsulfonylmethane (MSM) 1500 MG TABS Take 1 capsule by mouth daily. Historical Provider, MD   vitamin E 1000 UNITS capsule Take 1,000 Units by mouth daily. Historical Provider, MD       Current Medications:  Current Facility-Administered Medications   Medication Dose Route Frequency Provider Last Rate Last Admin    vitamin E capsule 1,000 Units  1,000 Units Oral Daily Ramirez Blackburn MD        therapeutic multivitamin-minerals 1 tablet  1 tablet Oral Daily Viktor Plascencia MD        sucralfate (CARAFATE) tablet 1 g  1 g Oral BID Ramirez Blackburn MD        lisinopril (PRINIVIL;ZESTRIL) tablet 20 mg  20 mg Oral Daily Ramirez Blackburn MD        0.9 % sodium chloride infusion   IntraVENous Continuous Ramirez Blackburn MD        perflutren lipid microspheres (DEFINITY) injection 1.65 mg  1.5 mL IntraVENous ONCE PRN Danielle Douglas MD         Current Outpatient Medications   Medication Sig Dispense Refill    lisinopril-hydroCHLOROthiazide (PRINZIDE;ZESTORETIC) 20-12.5 MG per tablet Take 1 tablet by mouth daily 90 tablet 1    sucralfate (CARAFATE) 1 GM tablet Take 1 tablet by mouth 2 times daily 180 tablet 1    metroNIDAZOLE (METROGEL) 0.75 % gel Apply 1 Tube topically 2 times daily Apply topically 2 times daily. 1 Tube 1    Multiple Vitamins-Minerals (THERAPEUTIC MULTIVITAMIN-MINERALS) tablet Take 1 tablet by mouth daily.       Omega-3 Fatty Acids (FISH OIL) 1000 MG CAPS Take 1,000 mg by mouth 1.7       Last 3 CK, CKMB, Troponin  No results for input(s): CKTOTAL, CKMB, TROPONINI in the last 72 hours. Last 3 Pro-BNP:  No results for input(s): PROBNP in the last 72 hours. No results found for: PROBNP    Last 3 Glucose:     Recent Labs     10/24/21  2030   GLUCOSE 92       Last 3 Coags:  No results for input(s): PROTIME, INR, PTT in the last 72 hours. No results found for: PROTIME, INR, PTT    Last 3 Lipid Panel:  No results for input(s): LDLCALC, HDL, TRIG in the last 72 hours. Invalid input(s): CHLPL  Lab Results   Component Value Date    LDLCALC 113 (H) 08/04/2021    LDLCALC 122 (H) 08/03/2020    LDLCALC 136 (H) 07/23/2019     Lab Results   Component Value Date    HDL 67 08/04/2021    HDL 73 08/03/2020    HDL 68 07/23/2019     Lab Results   Component Value Date    TRIG 56 08/04/2021    TRIG 57 08/03/2020    TRIG 83 07/23/2019     No components found for: CHLPL    TSH:  No results for input(s): TSH in the last 72 hours. Lab Results   Component Value Date    TSH 2.50 07/11/2016       ABGs:  No results for input(s): PH, PO2, PCO2, HCO3, BE, O2SAT in the last 72 hours. Lactic Acid:  No results for input(s): LACTA in the last 72 hours. Radiology:  RAD Results:  CT Head WO Contrast   Final Result   No acute intracranial abnormality. Bilateral maxillary and ethmoid sinusitis. XR CHEST PORTABLE   Final Result   No acute process. EKG and Telemetry:  12-lead EKG personally reviewed and shows NSR rate 68, normal ECG    Telemetry personally reviewed and shows sinus        ASSESSMENT / PLAN:    1. Syncope likely orthostatic by history with combined severe hyponatremia. ECG normal, no arrhythmias so far, obtain echo, correct hyponatremia. 2 Hyponatremia +361 certainly could contribute to some neurologic disturbance and gait. Unclear how acute, not volume overloaded, Has been on thiazide diuretic at home, ? SIADH. -w/u per PCP  3 Hypertension controlled on prinizide.  HCTZ now off. Thank you for consultation.     Ken Castellon MD, MD, [address] [place] [address] [place] [facility] [place] [facility]    Electronically signed by Ken Castellon MD on 10/25/2021 at 9:31 AM

## 2021-10-26 VITALS
SYSTOLIC BLOOD PRESSURE: 129 MMHG | RESPIRATION RATE: 18 BRPM | OXYGEN SATURATION: 98 % | HEART RATE: 65 BPM | DIASTOLIC BLOOD PRESSURE: 88 MMHG | TEMPERATURE: 98.7 F

## 2021-10-26 LAB
ANION GAP SERPL CALCULATED.3IONS-SCNC: 13 MMOL/L (ref 7–16)
BUN BLDV-MCNC: 13 MG/DL (ref 6–23)
CALCIUM SERPL-MCNC: 9.5 MG/DL (ref 8.6–10.2)
CHLORIDE BLD-SCNC: 100 MMOL/L (ref 98–107)
CO2: 22 MMOL/L (ref 22–29)
CORTISOL TOTAL: 7.13 MCG/DL (ref 2.68–18.4)
CREAT SERPL-MCNC: 1.1 MG/DL (ref 0.7–1.2)
GFR AFRICAN AMERICAN: >60
GFR NON-AFRICAN AMERICAN: >60 ML/MIN/1.73
GLUCOSE BLD-MCNC: 83 MG/DL (ref 74–99)
POTASSIUM SERPL-SCNC: 4.1 MMOL/L (ref 3.5–5)
SODIUM BLD-SCNC: 135 MMOL/L (ref 132–146)

## 2021-10-26 PROCEDURE — 80048 BASIC METABOLIC PNL TOTAL CA: CPT

## 2021-10-26 PROCEDURE — 82533 TOTAL CORTISOL: CPT

## 2021-10-26 PROCEDURE — 6370000000 HC RX 637 (ALT 250 FOR IP): Performed by: NURSE PRACTITIONER

## 2021-10-26 PROCEDURE — G0378 HOSPITAL OBSERVATION PER HR: HCPCS

## 2021-10-26 PROCEDURE — 6370000000 HC RX 637 (ALT 250 FOR IP): Performed by: INTERNAL MEDICINE

## 2021-10-26 PROCEDURE — 36415 COLL VENOUS BLD VENIPUNCTURE: CPT

## 2021-10-26 RX ORDER — LISINOPRIL 20 MG/1
20 TABLET ORAL DAILY
Qty: 30 TABLET | Refills: 3 | Status: SHIPPED | OUTPATIENT
Start: 2021-10-27 | End: 2021-11-23 | Stop reason: SDUPTHER

## 2021-10-26 RX ADMIN — SODIUM BICARBONATE 650 MG: 650 TABLET ORAL at 08:07

## 2021-10-26 RX ADMIN — SUCRALFATE 1 G: 1 TABLET ORAL at 08:07

## 2021-10-26 RX ADMIN — Medication 1000 UNITS: at 08:07

## 2021-10-26 RX ADMIN — Medication 1 TABLET: at 08:07

## 2021-10-26 RX ADMIN — LISINOPRIL 20 MG: 20 TABLET ORAL at 08:07

## 2021-10-26 ASSESSMENT — PAIN SCALES - GENERAL: PAINLEVEL_OUTOF10: 0

## 2021-10-26 NOTE — ACP (ADVANCE CARE PLANNING)
Advance Care Planning   Healthcare Decision Maker:  Primary :  Samuel Spears spouse   Secondary  Child: Yvrose North  [phone]    Click here to complete Healthcare Decision Makers including selection of the Healthcare Decision Maker Relationship (ie \"Primary\").   Provide by sundar per conversation

## 2021-10-26 NOTE — PROGRESS NOTES
Nephrology Progress Note  [facility]  From consult 10/25/21:  HPI:   Priscila King is a 66year old male we were asked to see regarding hyponatremia. His serum sodium was 122 at 2030 on 10/24 was repeated at 1348 and was 127. He had an order for IVF but this was not hanging upon my exam. He presented to the ED for syncope on 10/24/21. He had been on lisinopril/HCTZ prior to admission, the HCTZ has been held. He denies any nausea, vomiting of diarrhea PTA. He is feeling well currently, his repeat K was 5.6, hemolyzed specimen. Repeat has been ordered. He is awake, alert and oriented in the bed, no distress. 10/26/21- awake and alert. Feeling well. For echo today. PMH:    Past Medical History:   Diagnosis Date    Diverticulitis     GERD (gastroesophageal reflux disease)     High cholesterol     Hypertension     Rosacea        Patient Active Problem List   Diagnosis    Class 1 obesity due to excess calories without serious comorbidity with body mass index (BMI) of 32.0 to 32.9 in adult    Benign essential hypertension    GERD (gastroesophageal reflux disease)    Rosacea    Hyperlipidemia    Syncope and collapse       Meds:     vitamin E  1,000 Units Oral Daily    therapeutic multivitamin-minerals  1 tablet Oral Daily    sucralfate  1 g Oral BID    lisinopril  20 mg Oral Daily    sodium bicarbonate  650 mg Oral BID           Meds prn:     perflutren lipid microspheres    Meds prior to admission:     No current facility-administered medications on file prior to encounter. Current Outpatient Medications on File Prior to Encounter   Medication Sig Dispense Refill    lisinopril-hydroCHLOROthiazide (PRINZIDE;ZESTORETIC) 20-12.5 MG per tablet Take 1 tablet by mouth daily 90 tablet 1    sucralfate (CARAFATE) 1 GM tablet Take 1 tablet by mouth 2 times daily 180 tablet 1       Allergies:    Patient has no known allergies.     Social History:     reports that he quit smoking about 37 years ago. He has a 25.00 pack-year smoking history. He has never used smokeless tobacco. He reports current alcohol use of about 8.0 standard drinks of alcohol per week. He reports that he does not use drugs. Family History:         Problem Relation Age of Onset    Other Mother         bowel obstruction    No Known Problems Father     No Known Problems Sister     Atrial Fibrillation Brother            Physical Exam:      Patient Vitals for the past 24 hrs:   BP Temp Temp src Pulse Resp SpO2   10/26/21 0800 129/88 98.7 °F (37.1 °C) Temporal 65 18 98 %   10/25/21 2000  98.2 °F (36.8 °C) Oral 68 18 98 %   10/25/21 1545 124/79 98.8 °F (37.1 °C) Temporal 74 18 98 %   10/25/21 1345 139/79 98.7 °F (37.1 °C) Temporal 78 18 98 %   10/25/21 1042 132/75 98.2 °F (36.8 °C) Oral 72 14 96 %         Intake/Output Summary (Last 24 hours) at 10/26/2021 0946  Last data filed at 10/26/2021 0800  Gross per 24 hour   Intake 240 ml   Output    Net 240 ml       Constitutional: Patient in no acute distress   Head: normocephalic, atraumatic   Neck: supple, no jvd  Cardiovascular: S1 S2 no S3 or rub  Respiratory: Clear and equal bilaterally  Gastrointestinal: soft, nontender, nondistended, no hepatosplenomegaly  Ext: no edema   Neuro: awake, alert and oriented  Skin: dry, no rash       Data:    Recent Labs     10/24/21  2030   WBC 7.4   HGB 12.4*   HCT 34.4*   MCV 90.5          Recent Labs     10/24/21  2030 10/24/21  2030 10/25/21  1348 10/25/21  1532 10/26/21  0504   *   < > 127* 128* 135   K 3.7  --  5.6* 4.1 4.1   CL 87*   < > 92* 93* 100   CO2 19*   < > 18* 21* 22   CREATININE 1.0   < > 0.9 1.1 1.1   BUN 12   < > 11 14 13   LABGLOM >60   < > >60 >60 >60   GLUCOSE 92   < > 172* 177* 83   CALCIUM 9.1   < > 9.6 9.1 9.5   MG 1.7  --   --   --   --     < > = values in this interval not displayed.        Vit D, 25-Hydroxy   Date Value Ref Range Status   07/11/2016 34  Final       PTH   Date Value Ref Range Status 08/16/2021 32 15 - 65 pg/mL Final       No results for input(s): ALT, AST, ALKPHOS, BILITOT, BILIDIR in the last 72 hours. No results for input(s): LABALBU in the last 72 hours. Iron   Date Value Ref Range Status   08/16/2021 123 59 - 158 mcg/dL Final     TIBC   Date Value Ref Range Status   08/16/2021 318 250 - 450 mcg/dL Final       Vitamin B-12   Date Value Ref Range Status   08/16/2021 478 211 - 946 pg/mL Final       Folate   Date Value Ref Range Status   08/16/2021 19.8 4.8 - 24.2 ng/mL Final         Lab Results   Component Value Date    COLORU Yellow 10/24/2021    NITRU Negative 10/24/2021    GLUCOSEU Negative 10/24/2021    KETUA Negative 10/24/2021    UROBILINOGEN 0.2 10/24/2021    BILIRUBINUR Negative 10/24/2021       Lab Results   Component Value Date/Time    OSMOU 167 (L) 10/25/2021 06:20 PM       No components found for: URIC    No results found for: LIPIDPAN      Assessment and Plan:    1. Hypotonic Hyponatremia-   Prerenal with FeNa <1%  Was on lisinopril/HCTZ PTA  HCTZ being held   Na at admission 122-->127-->128-->135  Correction  acceptable  TSH, T4,  and am cortisol all WNL  Uric acid slightly elevated, would recheck when rehydrated. 2. Metabolic acidosis-  Without GAYE  Improved on po HCO3     3. Mild anemia-  Will follow     4. Hypertension-  Continue on lisinopril  At goal<130/80  Would suggest he stay off HCTZ at discharge    5. Syncope-  Cardiology following  For Echo      EMILY Watson - CNP     Patient seen and examined all key components of the physical performed independently , case discussed with NP, all pertinent labs and radiologic tests personally reviewed agree with above.       Garrett Mays MD

## 2021-10-26 NOTE — PLAN OF CARE
Problem: Falls - Risk of:  Goal: Will remain free from falls  Description: Will remain free from falls  10/26/2021 0934 by Julita Palomares RN  Outcome: Ongoing  10/26/2021 0006 by Calin Constantino RN  Outcome: Met This Shift  Goal: Absence of physical injury  Description: Absence of physical injury  10/26/2021 0934 by Julita Palomares RN  Outcome: Ongoing  10/26/2021 0006 by Calin Constantino RN  Outcome: Met This Shift

## 2021-10-26 NOTE — CARE COORDINATION
Patient place in observation after presenting to ED with episode of syncope. Na 122  Supplement given . Renal and cardiology consulted. 2 d echo orderd. Patient had been on lisonopril/HCTZ PTA. Na 135 today after HCTZ held. Met with patient at bedside to explain role and discuss transition of care. patient lives with spouse and son in single level home and uses no assitive device. His PCP is Dr. Genesis Strange and uses [facility] on [facility] [address]. No discharge needs identified at this time . Spouse will transport home when medically cleared. CM/SW will continue to follow.

## 2021-10-26 NOTE — PROGRESS NOTES
CLINICAL PHARMACY NOTE: MEDS TO BEDS    Total # of Prescriptions Filled: 1   The following medications were delivered to the patient:  · Lisinopril 20mg    Additional Documentation:    Picked up at pharmacy 10/26 2:30

## 2021-10-26 NOTE — PLAN OF CARE
Problem: Falls - Risk of:  Goal: Will remain free from falls  Description: Will remain free from falls  Outcome: Met This Shift     Problem: Falls - Risk of:  Goal: Absence of physical injury  Description: Absence of physical injury  Outcome: Met This Shift     Problem: Falls - Risk of:  Goal: Absence of physical injury  Description: Absence of physical injury  Outcome: Met This Shift

## 2021-10-26 NOTE — PROGRESS NOTES
Subjective:    Chief complaint:    Doing well  Denies any complaints    Objective:    /88   Pulse 65   Temp 98.7 °F (37.1 °C) (Temporal)   Resp 18   SpO2 98%   General : Awake ,alert,no distress. Heart:  RRR, no murmurs, gallops, or rubs. Lungs:  CTA bilaterally, no wheeze, rales or rhonchi  Abd: bowel sounds present, nontender, nondistended, no masses  Extrem:  No clubbing, cyanosis, or edema    CBC:   Lab Results   Component Value Date    WBC 7.4 10/24/2021    RBC 3.80 10/24/2021    HGB 12.4 10/24/2021    HCT 34.4 10/24/2021    MCV 90.5 10/24/2021    MCH 32.6 10/24/2021    MCHC 36.0 10/24/2021    RDW 11.8 10/24/2021     10/24/2021    MPV 9.2 10/24/2021     BMP:    Lab Results   Component Value Date     10/26/2021    K 4.1 10/26/2021    K 3.7 10/24/2021     10/26/2021    CO2 22 10/26/2021    BUN 13 10/26/2021    LABALBU 4.3 08/04/2021    LABALBU 4.2 06/28/2011    CREATININE 1.1 10/26/2021    CALCIUM 9.5 10/26/2021    GFRAA >60 10/26/2021    LABGLOM >60 10/26/2021    GLUCOSE 83 10/26/2021    GLUCOSE 90 06/28/2011     PT/INR:  No results found for: PROTIME, INR  Troponin:  No results found for: TROPONINI    No results for input(s): LABURIN in the last 72 hours. No results for input(s): BC in the last 72 hours. No results for input(s): Atha Christian in the last 72 hours.       Current Facility-Administered Medications:     vitamin E capsule 1,000 Units, 1,000 Units, Oral, Daily, Keisha Suresh MD, 1,000 Units at 10/26/21 0807    therapeutic multivitamin-minerals 1 tablet, 1 tablet, Oral, Daily, Keisha Suresh MD, 1 tablet at 10/26/21 0807    sucralfate (CARAFATE) tablet 1 g, 1 g, Oral, BID, Keisha Suresh MD, 1 g at 10/26/21 0807    lisinopril (PRINIVIL;ZESTRIL) tablet 20 mg, 20 mg, Oral, Daily, Keisha Suresh MD, 20 mg at 10/26/21 0807    perflutren lipid microspheres (DEFINITY) injection 1.65 mg, 1.5 mL, IntraVENous, ONCE PRN, Sergio Ascencio MD  [facility] sodium bicarbonate tablet 650 mg, 650 mg, Oral, BID, Ashish Alberts, APRN - CNP, 650 mg at 10/26/21 0192    ADULT DIET; Regular    CT Head WO Contrast   Final Result   No acute intracranial abnormality. Bilateral maxillary and ethmoid sinusitis. XR CHEST PORTABLE   Final Result   No acute process. Assessment:    Active Problems:    Syncope and collapse  Resolved Problems:    * No resolved hospital problems. *  Hyponatremia resolved  Hypertension stable    Plan:    Cardiology nephrology note  Discharge home with outpatient follow-up  Discontinue hydrochlorothiazide        Diane Gonzalez MD  1:09 PM  10/26/2021    NOTE: This report was transcribed using voice recognition software.  Every effort was made to ensure accuracy; however, inadvertent transcription errors may be present

## 2021-10-26 NOTE — PROGRESS NOTES
[facility] [address]    Name: Faye Oviedo    Age: 66 y.o. PCP: Kyree Moya MD    Date of Admission: 10/24/2021  7:30 PM    Date of Service: 10/26/2021    Chief Complaint: Follow-up for syncope  History of Present Illness: The patient is a 66y.o. year old male with hypertension, no other cardiac history who states was in his usual state of health until yesterday evening. He was in the living room on the couch, got up to walk into the kitchen, became lightheaded and passed out hitting his forehead on a chair. Wife came to his aide. He believes he was out 2-3 minutes, but when he awoke had no other lightheadedness, nausea, diaphoresis, chest pains or palpitations. States he had a good sized diner earlier, did not feel he was dehydrated and denies over drinking. Came to ED and found to have a sodium of 122. Only mildly orthostatic here. States 3-4 weeks ago had a similar incident of fainting, but did not seek medical attention.     Interim History:  No new overnight cardiac complaints. Currently with no complaints of CP, SOB, palpitations, dizziness, or lightheadedness. Na+ a bit better today. Telemetry personally reviewed and showed sinus rhythm, rare PAC      Review of Systems:   Cardiac: As per HPI  General: No fever, chills  Pulmonary: No cough, wheeze, or shortness of breath  GI: No nausea, vomiting,or abdominal pain  Neuro: No headache or confusion    Problem List:  Active Problems:    Syncope and collapse  Resolved Problems:    * No resolved hospital problems.  *      Past Medical History:  Past Medical History:   Diagnosis Date    Diverticulitis     GERD (gastroesophageal reflux disease)     High cholesterol     Hypertension     Rosacea        Allergies:  No Known Allergies    Current Medications:  Current Facility-Administered Medications   Medication Dose Route Frequency Provider Last Rate Last Admin    vitamin E capsule 1,000 Units  1,000 Units Oral Daily Fariba Lagunas MD   1,000 Units at 10/26/21 5925    therapeutic multivitamin-minerals 1 tablet  1 tablet Oral Daily Fariba Lagunas MD   1 tablet at 10/26/21 7114    sucralfate (CARAFATE) tablet 1 g  1 g Oral BID Fariba Lagunas MD   1 g at 10/26/21 8227    lisinopril (PRINIVIL;ZESTRIL) tablet 20 mg  20 mg Oral Daily Fariba Lagunas MD   20 mg at 10/26/21 9704    perflutren lipid microspheres (DEFINITY) injection 1.65 mg  1.5 mL IntraVENous ONCE PRN Gordo Tillman MD        sodium bicarbonate tablet 650 mg  650 mg Oral BID Ever Aid, APRN - CNP   650 mg at 10/26/21 2372         Physical Exam:  /88   Pulse 65   Temp 98.7 °F (37.1 °C) (Temporal)   Resp 18   SpO2 98%   Weight change: Wt Readings from Last 3 Encounters:   08/04/21 206 lb (93.4 kg)   07/23/19 215 lb (97.5 kg)   06/10/19 219 lb (99.3 kg)     General: Awake, alert, oriented x3, no acute distress  Neck: No JVD, carotid bruits, thyromegaly, or lymphadenopathy  Cardiac: Regular rate and rhythm, normal S1 and split S2, no murmurs, no S3 or S4, no pericardial rubs. Carotid upstrokes brisk  Resp: clear bilaterally without wheezes, rhonchi, or rales; unlabored respirations  Abdomen: soft, nontender, nondistended, BS+; no masses, bruits, or hepatomegaly  Extremities: no cyanosis, clubbing, or edema. Distal pulses intact  Skin: Warm and dry, no rashes or lesions  Neuro: moves all extremities to command, no focal deficits noted    Intake/Output:  No intake or output data in the 24 hours ending 10/26/21 0918  No intake/output data recorded.     Laboratory Tests:  Last 3 CBC:  Recent Labs     10/24/21  2030   WBC 7.4   RBC 3.80   HGB 12.4*   HCT 34.4*   MCV 90.5   MCH 32.6   MCHC 36.0*   RDW 11.8      MPV 9.2       Last 3 CMP:    Recent Labs     10/25/21  1348 10/25/21  1532 10/26/21  0504   * 128* 135   K 5.6* 4.1 4.1   CL 92* 93* 100   CO2 18* 21* 22   BUN 11 14 13   CREATININE 0.9 1.1 1.1   GLUCOSE 172* 177* 83   CALCIUM 9.6 9.1 9.5       Last 3 Mag/Phos:  Recent Labs     10/24/21  2030   MG 1.7       Last 3 CK, CKMB, Troponin  No results for input(s): CKTOTAL, CKMB, TROPONINI in the last 72 hours. Last 3 BNP:  No results for input(s): BNP in the last 72 hours. No results found for: BNP    Last 3 Glucose:     Recent Labs     10/25/21  1348 10/25/21  1532 10/26/21  0504   GLUCOSE 172* 177* 83       Last 3 Coags:  No results for input(s): PROTIME, INR, PTT in the last 72 hours. No results found for: PROTIME, INR, PTT    Last 3 Lipid Panel:  No results for input(s): LDLCALC, HDL, TRIG in the last 72 hours. Invalid input(s): CHLPL  Lab Results   Component Value Date    LDLCALC 113 (H) 08/04/2021    LDLCALC 122 (H) 08/03/2020    LDLCALC 136 (H) 07/23/2019     Lab Results   Component Value Date    HDL 67 08/04/2021    HDL 73 08/03/2020    HDL 68 07/23/2019     Lab Results   Component Value Date    TRIG 56 08/04/2021    TRIG 57 08/03/2020    TRIG 83 07/23/2019     No components found for: CHLPL    TSH:  Recent Labs     10/25/21  1532   TSH 1.710     Lab Results   Component Value Date    TSH 1.710 10/25/2021           Radiology:  CT Head WO Contrast   Final Result   No acute intracranial abnormality. Bilateral maxillary and ethmoid sinusitis. XR CHEST PORTABLE   Final Result   No acute process. ASSESSMENT / PLAN:  1. Syncope likely orthostatic by history with combined severe hyponatremia. ECG normal, no arrhythmias so far,  Echo pending, hyponatremia improved. Suspect home soon. 2          Hyponatremia GY+[id number] certainly could contribute to some neurologic disturbance and gait. Unclear how acute, not volume overloaded, Has been on thiazide diuretic at home-now off. Na+  135 today. 3          Hypertension controlled on prinizide.  HCTZ now off.                Santos Nevarez MD, MD, [facility]  The [address] at [facility]    Electronically signed by Rashaad Medina Rubén Leon MD on 10/26/2021 at 9:18 AM

## 2021-11-03 DIAGNOSIS — E87.1 HYPONATREMIA: ICD-10-CM

## 2021-11-03 LAB
ANION GAP SERPL CALCULATED.3IONS-SCNC: 13 MMOL/L (ref 7–16)
BUN BLDV-MCNC: 16 MG/DL (ref 6–23)
CALCIUM SERPL-MCNC: 9.7 MG/DL (ref 8.6–10.2)
CHLORIDE BLD-SCNC: 102 MMOL/L (ref 98–107)
CO2: 22 MMOL/L (ref 22–29)
CREAT SERPL-MCNC: 1.2 MG/DL (ref 0.7–1.2)
GFR AFRICAN AMERICAN: >60
GFR NON-AFRICAN AMERICAN: 59 ML/MIN/1.73
GLUCOSE BLD-MCNC: 106 MG/DL (ref 74–99)
POTASSIUM SERPL-SCNC: 4.2 MMOL/L (ref 3.5–5)
SODIUM BLD-SCNC: 137 MMOL/L (ref 132–146)

## 2021-11-29 PROBLEM — R55 SYNCOPE AND COLLAPSE: Status: RESOLVED | Noted: 2021-10-25 | Resolved: 2021-11-29

## 2021-12-13 ENCOUNTER — HOSPITAL ENCOUNTER (OUTPATIENT)
Dept: MRI IMAGING | Age: 71
Discharge: HOME | End: 2021-12-15
Payer: MEDICARE

## 2021-12-13 DIAGNOSIS — M21.371 RIGHT FOOT DROP: ICD-10-CM

## 2021-12-13 PROCEDURE — 72148 MRI LUMBAR SPINE W/O DYE: CPT

## 2022-01-01 DIAGNOSIS — E78.00 PURE HYPERCHOLESTEROLEMIA: ICD-10-CM

## 2022-01-01 DIAGNOSIS — R70.0 ELEVATED SED RATE: ICD-10-CM

## 2022-01-01 DIAGNOSIS — R79.82 ELEVATED C-REACTIVE PROTEIN (CRP): ICD-10-CM

## 2022-01-01 LAB
ALBUMIN SERPL-MCNC: 3.9 G/DL (ref 3.5–5.2)
ALP BLD-CCNC: 83 U/L (ref 40–129)
ALT SERPL-CCNC: 13 U/L (ref 0–40)
ANION GAP SERPL CALCULATED.3IONS-SCNC: 13 MMOL/L (ref 7–16)
AST SERPL-CCNC: 21 U/L (ref 0–39)
BILIRUB SERPL-MCNC: 0.5 MG/DL (ref 0–1.2)
BUN BLDV-MCNC: 11 MG/DL (ref 6–23)
C-REACTIVE PROTEIN: 0.3 MG/DL (ref 0–0.4)
CALCIUM SERPL-MCNC: 9.3 MG/DL (ref 8.6–10.2)
CHLORIDE BLD-SCNC: 104 MMOL/L (ref 98–107)
CHOLESTEROL, TOTAL: 165 MG/DL (ref 0–199)
CO2: 23 MMOL/L (ref 22–29)
CREAT SERPL-MCNC: 1.1 MG/DL (ref 0.7–1.2)
GFR AFRICAN AMERICAN: >60
GFR NON-AFRICAN AMERICAN: >60 ML/MIN/1.73
GLUCOSE BLD-MCNC: 96 MG/DL (ref 74–99)
HDLC SERPL-MCNC: 74 MG/DL
LDL CHOLESTEROL CALCULATED: 80 MG/DL (ref 0–99)
POTASSIUM SERPL-SCNC: 4.8 MMOL/L (ref 3.5–5)
SEDIMENTATION RATE, ERYTHROCYTE: 52 MM/HR (ref 0–15)
SODIUM BLD-SCNC: 140 MMOL/L (ref 132–146)
TOTAL PROTEIN: 7.3 G/DL (ref 6.4–8.3)
TRIGL SERPL-MCNC: 57 MG/DL (ref 0–149)
VLDLC SERPL CALC-MCNC: 11 MG/DL

## 2022-02-19 ENCOUNTER — HOSPITAL ENCOUNTER (OUTPATIENT)
Dept: MRI IMAGING | Age: 72
Discharge: HOME | End: 2022-02-21
Payer: MEDICARE

## 2022-02-19 DIAGNOSIS — I63.9 CEREBROVASCULAR ACCIDENT (CVA), UNSPECIFIED MECHANISM (HCC): ICD-10-CM

## 2022-02-19 PROCEDURE — 70551 MRI BRAIN STEM W/O DYE: CPT

## 2022-02-22 PROBLEM — F32.1 CURRENT MODERATE EPISODE OF MAJOR DEPRESSIVE DISORDER WITHOUT PRIOR EPISODE (HCC): Status: ACTIVE | Noted: 2022-02-22

## 2022-03-30 PROBLEM — G47.00 INSOMNIA: Status: ACTIVE | Noted: 2022-03-30

## 2022-08-03 DIAGNOSIS — E78.00 PURE HYPERCHOLESTEROLEMIA: ICD-10-CM

## 2022-08-03 LAB
ALBUMIN SERPL-MCNC: 4.2 G/DL (ref 3.5–5.2)
ALP BLD-CCNC: 93 U/L (ref 40–129)
ALT SERPL-CCNC: 13 U/L (ref 0–40)
ANION GAP SERPL CALCULATED.3IONS-SCNC: 12 MMOL/L (ref 7–16)
AST SERPL-CCNC: 21 U/L (ref 0–39)
BILIRUB SERPL-MCNC: 0.5 MG/DL (ref 0–1.2)
BUN BLDV-MCNC: 12 MG/DL (ref 6–23)
CALCIUM SERPL-MCNC: 9.8 MG/DL (ref 8.6–10.2)
CHLORIDE BLD-SCNC: 100 MMOL/L (ref 98–107)
CHOLESTEROL, TOTAL: 145 MG/DL (ref 0–199)
CO2: 24 MMOL/L (ref 22–29)
CREAT SERPL-MCNC: 1.2 MG/DL (ref 0.7–1.2)
GFR AFRICAN AMERICAN: >60
GFR NON-AFRICAN AMERICAN: 59 ML/MIN/1.73
GLUCOSE BLD-MCNC: 96 MG/DL (ref 74–99)
HDLC SERPL-MCNC: 62 MG/DL
LDL CHOLESTEROL CALCULATED: 71 MG/DL (ref 0–99)
POTASSIUM SERPL-SCNC: 4.5 MMOL/L (ref 3.5–5)
SODIUM BLD-SCNC: 136 MMOL/L (ref 132–146)
TOTAL PROTEIN: 8.1 G/DL (ref 6.4–8.3)
TRIGL SERPL-MCNC: 62 MG/DL (ref 0–149)
VLDLC SERPL CALC-MCNC: 12 MG/DL

## 2022-10-26 DIAGNOSIS — R53.83 FATIGUE, UNSPECIFIED TYPE: ICD-10-CM

## 2022-10-26 LAB
ALBUMIN SERPL-MCNC: 3.6 G/DL (ref 3.5–5.2)
ALP BLD-CCNC: 82 U/L (ref 40–129)
ALT SERPL-CCNC: 17 U/L (ref 0–40)
ANION GAP SERPL CALCULATED.3IONS-SCNC: 15 MMOL/L (ref 7–16)
AST SERPL-CCNC: 27 U/L (ref 0–39)
BASOPHILS ABSOLUTE: 0.04 E9/L (ref 0–0.2)
BASOPHILS RELATIVE PERCENT: 0.5 % (ref 0–2)
BILIRUB SERPL-MCNC: 0.7 MG/DL (ref 0–1.2)
BUN BLDV-MCNC: 13 MG/DL (ref 6–23)
C-REACTIVE PROTEIN: 1.9 MG/DL (ref 0–0.4)
CALCIUM SERPL-MCNC: 9.1 MG/DL (ref 8.6–10.2)
CHLORIDE BLD-SCNC: 103 MMOL/L (ref 98–107)
CO2: 16 MMOL/L (ref 22–29)
CREAT SERPL-MCNC: 1.2 MG/DL (ref 0.7–1.2)
EOSINOPHILS ABSOLUTE: 0.14 E9/L (ref 0.05–0.5)
EOSINOPHILS RELATIVE PERCENT: 1.9 % (ref 0–6)
GFR SERPL CREATININE-BSD FRML MDRD: >60 ML/MIN/1.73
GLUCOSE BLD-MCNC: 102 MG/DL (ref 74–99)
HCT VFR BLD CALC: 32.6 % (ref 37–54)
HEMOGLOBIN: 11 G/DL (ref 12.5–16.5)
IMMATURE GRANULOCYTES #: 0.05 E9/L
IMMATURE GRANULOCYTES %: 0.7 % (ref 0–5)
LYMPHOCYTES ABSOLUTE: 1.06 E9/L (ref 1.5–4)
LYMPHOCYTES RELATIVE PERCENT: 14.4 % (ref 20–42)
MCH RBC QN AUTO: 29.4 PG (ref 26–35)
MCHC RBC AUTO-ENTMCNC: 33.7 % (ref 32–34.5)
MCV RBC AUTO: 87.2 FL (ref 80–99.9)
MONOCYTES ABSOLUTE: 0.51 E9/L (ref 0.1–0.95)
MONOCYTES RELATIVE PERCENT: 6.9 % (ref 2–12)
NEUTROPHILS ABSOLUTE: 5.54 E9/L (ref 1.8–7.3)
NEUTROPHILS RELATIVE PERCENT: 75.6 % (ref 43–80)
PDW BLD-RTO: 14.3 FL (ref 11.5–15)
PLATELET # BLD: 310 E9/L (ref 130–450)
PMV BLD AUTO: 9.6 FL (ref 7–12)
POTASSIUM SERPL-SCNC: 3.5 MMOL/L (ref 3.5–5)
RBC # BLD: 3.74 E12/L (ref 3.8–5.8)
SODIUM BLD-SCNC: 134 MMOL/L (ref 132–146)
TOTAL CK: 72 U/L (ref 20–200)
TOTAL PROTEIN: 7.1 G/DL (ref 6.4–8.3)
TSH SERPL DL<=0.05 MIU/L-ACNC: 2.99 UIU/ML (ref 0.27–4.2)
WBC # BLD: 7.3 E9/L (ref 4.5–11.5)

## 2022-10-27 LAB — SEDIMENTATION RATE, ERYTHROCYTE: 89 MM/HR (ref 0–15)

## 2022-11-02 DIAGNOSIS — R53.83 FATIGUE, UNSPECIFIED TYPE: ICD-10-CM

## 2022-11-02 LAB
ALBUMIN SERPL-MCNC: 3.8 G/DL (ref 3.5–5.2)
ALP BLD-CCNC: 80 U/L (ref 40–129)
ALT SERPL-CCNC: 18 U/L (ref 0–40)
ANION GAP SERPL CALCULATED.3IONS-SCNC: 15 MMOL/L (ref 7–16)
AST SERPL-CCNC: 25 U/L (ref 0–39)
BASOPHILS ABSOLUTE: 0.05 E9/L (ref 0–0.2)
BASOPHILS RELATIVE PERCENT: 0.6 % (ref 0–2)
BILIRUB SERPL-MCNC: 0.6 MG/DL (ref 0–1.2)
BUN BLDV-MCNC: 13 MG/DL (ref 6–23)
C-REACTIVE PROTEIN: 1.3 MG/DL (ref 0–0.4)
CALCIUM SERPL-MCNC: 9.2 MG/DL (ref 8.6–10.2)
CHLORIDE BLD-SCNC: 100 MMOL/L (ref 98–107)
CO2: 18 MMOL/L (ref 22–29)
CREAT SERPL-MCNC: 1.2 MG/DL (ref 0.7–1.2)
EOSINOPHILS ABSOLUTE: 0.11 E9/L (ref 0.05–0.5)
EOSINOPHILS RELATIVE PERCENT: 1.4 % (ref 0–6)
GFR SERPL CREATININE-BSD FRML MDRD: >60 ML/MIN/1.73
GLUCOSE BLD-MCNC: 96 MG/DL (ref 74–99)
HCT VFR BLD CALC: 31.4 % (ref 37–54)
HEMOGLOBIN: 10.4 G/DL (ref 12.5–16.5)
IMMATURE GRANULOCYTES #: 0.04 E9/L
IMMATURE GRANULOCYTES %: 0.5 % (ref 0–5)
LYMPHOCYTES ABSOLUTE: 1.49 E9/L (ref 1.5–4)
LYMPHOCYTES RELATIVE PERCENT: 18.7 % (ref 20–42)
MCH RBC QN AUTO: 29.2 PG (ref 26–35)
MCHC RBC AUTO-ENTMCNC: 33.1 % (ref 32–34.5)
MCV RBC AUTO: 88.2 FL (ref 80–99.9)
MONOCYTES ABSOLUTE: 0.63 E9/L (ref 0.1–0.95)
MONOCYTES RELATIVE PERCENT: 7.9 % (ref 2–12)
NEUTROPHILS ABSOLUTE: 5.63 E9/L (ref 1.8–7.3)
NEUTROPHILS RELATIVE PERCENT: 70.9 % (ref 43–80)
PDW BLD-RTO: 13.8 FL (ref 11.5–15)
PLATELET # BLD: 309 E9/L (ref 130–450)
PMV BLD AUTO: 9.4 FL (ref 7–12)
POTASSIUM SERPL-SCNC: 3.8 MMOL/L (ref 3.5–5)
RBC # BLD: 3.56 E12/L (ref 3.8–5.8)
SEDIMENTATION RATE, ERYTHROCYTE: 94 MM/HR (ref 0–15)
SODIUM BLD-SCNC: 133 MMOL/L (ref 132–146)
TOTAL PROTEIN: 7.2 G/DL (ref 6.4–8.3)
WBC # BLD: 8 E9/L (ref 4.5–11.5)

## 2022-11-03 LAB — ANTI-NUCLEAR ANTIBODY (ANA): NEGATIVE

## 2022-11-05 LAB — CYCLIC CITRULLINATED PEPTIDE ANTIBODY IGG: 0.7 U/ML (ref 0–7)

## 2022-11-11 ENCOUNTER — HOSPITAL ENCOUNTER (OUTPATIENT)
Dept: CT IMAGING | Age: 72
Discharge: HOME | End: 2022-11-13
Payer: MEDICARE

## 2022-11-11 DIAGNOSIS — R79.82 ELEVATED C-REACTIVE PROTEIN (CRP): ICD-10-CM

## 2022-11-11 DIAGNOSIS — R70.0 ELEVATED SED RATE: ICD-10-CM

## 2022-11-11 PROCEDURE — 71260 CT THORAX DX C+: CPT

## 2022-11-11 PROCEDURE — 74170 CT ABD WO CNTRST FLWD CNTRST: CPT

## 2022-11-11 PROCEDURE — 2580000003 HC RX 258: Performed by: RADIOLOGY

## 2022-11-11 PROCEDURE — 6360000004 HC RX CONTRAST MEDICATION: Performed by: RADIOLOGY

## 2022-11-11 RX ORDER — SODIUM CHLORIDE 0.9 % (FLUSH) 0.9 %
10 SYRINGE (ML) INJECTION ONCE
Status: COMPLETED | OUTPATIENT
Start: 2022-11-11 | End: 2022-11-11

## 2022-11-11 RX ADMIN — Medication 10 ML: at 16:43

## 2022-11-11 RX ADMIN — IOPAMIDOL 90 ML: 755 INJECTION, SOLUTION INTRAVENOUS at 16:43

## 2022-12-04 ENCOUNTER — APPOINTMENT (OUTPATIENT)
Dept: GENERAL RADIOLOGY | Age: 72
End: 2022-12-04
Payer: MEDICARE

## 2022-12-04 ENCOUNTER — APPOINTMENT (OUTPATIENT)
Dept: CT IMAGING | Age: 72
End: 2022-12-04
Payer: MEDICARE

## 2022-12-04 ENCOUNTER — HOSPITAL ENCOUNTER (OUTPATIENT)
Age: 72
Setting detail: OBSERVATION
Discharge: HOME | End: 2022-12-07
Attending: EMERGENCY MEDICINE | Admitting: INTERNAL MEDICINE
Payer: MEDICARE

## 2022-12-04 DIAGNOSIS — R42 DIZZINESS: Primary | ICD-10-CM

## 2022-12-04 PROBLEM — R27.0 ATAXIA: Status: ACTIVE | Noted: 2022-12-04

## 2022-12-04 LAB
ALBUMIN SERPL-MCNC: 3.7 G/DL (ref 3.5–5.2)
ALP BLD-CCNC: 108 U/L (ref 40–129)
ALT SERPL-CCNC: 10 U/L (ref 0–40)
ANION GAP SERPL CALCULATED.3IONS-SCNC: 14 MMOL/L (ref 7–16)
AST SERPL-CCNC: 18 U/L (ref 0–39)
BASOPHILS ABSOLUTE: 0.04 E9/L (ref 0–0.2)
BASOPHILS RELATIVE PERCENT: 0.5 % (ref 0–2)
BILIRUB SERPL-MCNC: 1 MG/DL (ref 0–1.2)
BUN BLDV-MCNC: 9 MG/DL (ref 6–23)
CALCIUM SERPL-MCNC: 9.7 MG/DL (ref 8.6–10.2)
CHLORIDE BLD-SCNC: 96 MMOL/L (ref 98–107)
CO2: 22 MMOL/L (ref 22–29)
CREAT SERPL-MCNC: 0.9 MG/DL (ref 0.7–1.2)
EOSINOPHILS ABSOLUTE: 0.04 E9/L (ref 0.05–0.5)
EOSINOPHILS RELATIVE PERCENT: 0.5 % (ref 0–6)
GFR SERPL CREATININE-BSD FRML MDRD: >60 ML/MIN/1.73
GLUCOSE BLD-MCNC: 98 MG/DL (ref 74–99)
HCT VFR BLD CALC: 34.3 % (ref 37–54)
HEMOGLOBIN: 11.6 G/DL (ref 12.5–16.5)
IMMATURE GRANULOCYTES #: 0.03 E9/L
IMMATURE GRANULOCYTES %: 0.4 % (ref 0–5)
INFLUENZA A BY PCR: NOT DETECTED
INFLUENZA B BY PCR: NOT DETECTED
LYMPHOCYTES ABSOLUTE: 1.43 E9/L (ref 1.5–4)
LYMPHOCYTES RELATIVE PERCENT: 18.2 % (ref 20–42)
MAGNESIUM: 1.9 MG/DL (ref 1.6–2.6)
MCH RBC QN AUTO: 30.1 PG (ref 26–35)
MCHC RBC AUTO-ENTMCNC: 33.8 % (ref 32–34.5)
MCV RBC AUTO: 89.1 FL (ref 80–99.9)
MONOCYTES ABSOLUTE: 0.48 E9/L (ref 0.1–0.95)
MONOCYTES RELATIVE PERCENT: 6.1 % (ref 2–12)
NEUTROPHILS ABSOLUTE: 5.84 E9/L (ref 1.8–7.3)
NEUTROPHILS RELATIVE PERCENT: 74.3 % (ref 43–80)
PDW BLD-RTO: 13.5 FL (ref 11.5–15)
PLATELET # BLD: 268 E9/L (ref 130–450)
PMV BLD AUTO: 8.9 FL (ref 7–12)
POTASSIUM SERPL-SCNC: 3.3 MMOL/L (ref 3.5–5)
RBC # BLD: 3.85 E12/L (ref 3.8–5.8)
SARS-COV-2, NAAT: NOT DETECTED
SODIUM BLD-SCNC: 132 MMOL/L (ref 132–146)
TOTAL PROTEIN: 6.9 G/DL (ref 6.4–8.3)
TROPONIN, HIGH SENSITIVITY: 38 NG/L (ref 0–11)
WBC # BLD: 7.9 E9/L (ref 4.5–11.5)

## 2022-12-04 PROCEDURE — 36415 COLL VENOUS BLD VENIPUNCTURE: CPT

## 2022-12-04 PROCEDURE — 96374 THER/PROPH/DIAG INJ IV PUSH: CPT

## 2022-12-04 PROCEDURE — G0378 HOSPITAL OBSERVATION PER HR: HCPCS

## 2022-12-04 PROCEDURE — 93005 ELECTROCARDIOGRAM TRACING: CPT | Performed by: PHYSICIAN ASSISTANT

## 2022-12-04 PROCEDURE — 70450 CT HEAD/BRAIN W/O DYE: CPT

## 2022-12-04 PROCEDURE — 96365 THER/PROPH/DIAG IV INF INIT: CPT

## 2022-12-04 PROCEDURE — 6370000000 HC RX 637 (ALT 250 FOR IP): Performed by: INTERNAL MEDICINE

## 2022-12-04 PROCEDURE — 87635 SARS-COV-2 COVID-19 AMP PRB: CPT

## 2022-12-04 PROCEDURE — 99285 EMERGENCY DEPT VISIT HI MDM: CPT

## 2022-12-04 PROCEDURE — 2580000003 HC RX 258: Performed by: EMERGENCY MEDICINE

## 2022-12-04 PROCEDURE — 80053 COMPREHEN METABOLIC PANEL: CPT

## 2022-12-04 PROCEDURE — 84484 ASSAY OF TROPONIN QUANT: CPT

## 2022-12-04 PROCEDURE — 85025 COMPLETE CBC W/AUTO DIFF WBC: CPT

## 2022-12-04 PROCEDURE — 71046 X-RAY EXAM CHEST 2 VIEWS: CPT

## 2022-12-04 PROCEDURE — 87502 INFLUENZA DNA AMP PROBE: CPT

## 2022-12-04 PROCEDURE — 6360000002 HC RX W HCPCS: Performed by: EMERGENCY MEDICINE

## 2022-12-04 PROCEDURE — 83735 ASSAY OF MAGNESIUM: CPT

## 2022-12-04 RX ORDER — SUCRALFATE 1 G/1
1 TABLET ORAL 2 TIMES DAILY
Status: DISCONTINUED | OUTPATIENT
Start: 2022-12-04 | End: 2022-12-07 | Stop reason: HOSPADM

## 2022-12-04 RX ORDER — ASPIRIN 81 MG/1
81 TABLET, CHEWABLE ORAL DAILY
Status: DISCONTINUED | OUTPATIENT
Start: 2022-12-04 | End: 2022-12-07 | Stop reason: HOSPADM

## 2022-12-04 RX ORDER — SODIUM CHLORIDE 9 MG/ML
INJECTION, SOLUTION INTRAVENOUS CONTINUOUS
Status: DISCONTINUED | OUTPATIENT
Start: 2022-12-04 | End: 2022-12-07

## 2022-12-04 RX ORDER — LISINOPRIL 20 MG/1
20 TABLET ORAL DAILY
Status: DISCONTINUED | OUTPATIENT
Start: 2022-12-04 | End: 2022-12-07 | Stop reason: HOSPADM

## 2022-12-04 RX ORDER — POTASSIUM CHLORIDE 7.45 MG/ML
10 INJECTION INTRAVENOUS ONCE
Status: COMPLETED | OUTPATIENT
Start: 2022-12-04 | End: 2022-12-04

## 2022-12-04 RX ADMIN — POTASSIUM CHLORIDE 10 MEQ: 7.46 INJECTION, SOLUTION INTRAVENOUS at 14:39

## 2022-12-04 RX ADMIN — SODIUM CHLORIDE: 9 INJECTION, SOLUTION INTRAVENOUS at 23:38

## 2022-12-04 RX ADMIN — ASPIRIN 81 MG CHEWABLE TABLET 81 MG: 81 TABLET CHEWABLE at 19:30

## 2022-12-04 RX ADMIN — LISINOPRIL 20 MG: 20 TABLET ORAL at 19:30

## 2022-12-04 RX ADMIN — SERTRALINE 25 MG: 50 TABLET, FILM COATED ORAL at 19:30

## 2022-12-04 RX ADMIN — SODIUM CHLORIDE: 9 INJECTION, SOLUTION INTRAVENOUS at 14:29

## 2022-12-04 RX ADMIN — SUCRALFATE 1 G: 1 TABLET ORAL at 19:30

## 2022-12-04 ASSESSMENT — PAIN - FUNCTIONAL ASSESSMENT: PAIN_FUNCTIONAL_ASSESSMENT: NONE - DENIES PAIN

## 2022-12-04 NOTE — ED PROVIDER NOTES
Department of Emergency Medicine   ED  Provider Note  Admit Date/RoomTime: 12/4/2022 10:19 AM  ED Room: [facility]          History of Present Illness:  12/4/22, Time: 2:50 PM EST  Chief Complaint   Patient presents with    Dizziness     Syncopal, frequent falls                Juani Andres is a 74 y.o. male presenting to the ED for dizziness. Patient's been dizzy intermittently for the past couple of months. Came on gradually, nothing makes it better or worse, no associated pain. States he has fallen multiple times in the past couple of weeks. He has passed out a couple times as well. States his PCP has been trying to free this out on the outpatient. Denies any weakness numbness in extremities. Denies any fever, chills, nausea, vomit, cough, sputum, or any other symptoms or complaints. Review of Systems:   Pertinent positives and negatives are stated within HPI, all other systems reviewed and are negative.        --------------------------------------------- PAST HISTORY ---------------------------------------------  Past Medical History:  has a past medical history of Diverticulitis, GERD (gastroesophageal reflux disease), High cholesterol, Hypertension, and Rosacea. Past Surgical History:  has a past surgical history that includes other surgical history (3/10/14); Colonoscopy (7/2/2012); Colonoscopy (08/04/2014); Endoscopy, colon, diagnostic (11/07/2016); and Upper gastrointestinal endoscopy (N/A, 6/10/2019). Social History:  reports that he quit smoking about 38 years ago. His smoking use included cigarettes. He has a 25.00 pack-year smoking history. He has never used smokeless tobacco. He reports that he does not currently use alcohol. He reports that he does not use drugs. Family History: family history includes Atrial Fibrillation in his brother; No Known Problems in his father and sister; Other in his mother. . Unless otherwise noted, family history is non contributory    The patients home medications have been reviewed. Allergies: Patient has no known allergies. ---------------------------------------------------PHYSICAL EXAM--------------------------------------    Constitutional/General: Alert and oriented x3  Head: Normocephalic and atraumatic  Eyes: PERRL, EOMI, sclera non icteric  Mouth: Oropharynx clear, handling secretions, no trismus, no asymmetry of the posterior oropharynx or uvular edema  Neck: Supple, full ROM, no stridor, no meningeal signs  Respiratory: Lungs clear to auscultation bilaterally, no wheezes, rales, or rhonchi. Not in respiratory distress  Cardiovascular:  Regular rate. Regular rhythm. 2+ distal pulses. Equal extremity pulses. Chest: No chest wall tenderness  GI:  Abdomen Soft, Non tender, Non distended. No rebound, guarding, or rigidity. No pulsatile masses. Musculoskeletal: Moves all extremities x 4. Warm and well perfused, no clubbing, cyanosis, or edema. Capillary refill <3 seconds  Integument: skin warm and dry. No rashes. Neurologic: GCS 15, no focal deficits, symmetric strength 5/5 in the upper and lower extremities bilaterally  Psychiatric: Normal Affect          -------------------------------------------------- RESULTS -------------------------------------------------  I have personally reviewed all laboratory and imaging results for this patient. Results are listed below.      LABS: (Lab results interpreted by me)  Results for orders placed or performed during the hospital encounter of 12/04/22   Rapid influenza A/B antigens    Specimen: Nares   Result Value Ref Range    Influenza A by PCR Not Detected Not Detected    Influenza B by PCR Not Detected Not Detected   COVID-19, Rapid    Specimen: Nasopharyngeal Swab   Result Value Ref Range    SARS-CoV-2, NAAT Not Detected Not Detected   CBC with Auto Differential   Result Value Ref Range    WBC 7.9 4.5 - 11.5 E9/L    RBC 3.85 3.80 - 5.80 E12/L    Hemoglobin 11.6 (L) 12.5 - 16.5 g/dL    Hematocrit 34.3 (L) 37.0 - 54.0 %    MCV 89.1 80.0 - 99.9 fL    MCH 30.1 26.0 - 35.0 pg    MCHC 33.8 32.0 - 34.5 %    RDW 13.5 11.5 - 15.0 fL    Platelets 678 787 - 172 E9/L    MPV 8.9 7.0 - 12.0 fL    Neutrophils % 74.3 43.0 - 80.0 %    Immature Granulocytes % 0.4 0.0 - 5.0 %    Lymphocytes % 18.2 (L) 20.0 - 42.0 %    Monocytes % 6.1 2.0 - 12.0 %    Eosinophils % 0.5 0.0 - 6.0 %    Basophils % 0.5 0.0 - 2.0 %    Neutrophils Absolute 5.84 1.80 - 7.30 E9/L    Immature Granulocytes # 0.03 E9/L    Lymphocytes Absolute 1.43 (L) 1.50 - 4.00 E9/L    Monocytes Absolute 0.48 0.10 - 0.95 E9/L    Eosinophils Absolute 0.04 (L) 0.05 - 0.50 E9/L    Basophils Absolute 0.04 0.00 - 0.20 E9/L   CMP   Result Value Ref Range    Sodium 132 132 - 146 mmol/L    Potassium 3.3 (L) 3.5 - 5.0 mmol/L    Chloride 96 (L) 98 - 107 mmol/L    CO2 22 22 - 29 mmol/L    Anion Gap 14 7 - 16 mmol/L    Glucose 98 74 - 99 mg/dL    BUN 9 6 - 23 mg/dL    Creatinine 0.9 0.7 - 1.2 mg/dL    Est, Glom Filt Rate >60 >=60 mL/min/1.73    Calcium 9.7 8.6 - 10.2 mg/dL    Total Protein 6.9 6.4 - 8.3 g/dL    Albumin 3.7 3.5 - 5.2 g/dL    Total Bilirubin 1.0 0.0 - 1.2 mg/dL    Alkaline Phosphatase 108 40 - 129 U/L    ALT 10 0 - 40 U/L    AST 18 0 - 39 U/L   Troponin   Result Value Ref Range    Troponin, High Sensitivity 38 (H) 0 - 11 ng/L   ,       RADIOLOGY:  Interpreted by Radiologist unless otherwise specified  CT Head W/O Contrast   Final Result   Generalized atrophy and chronic changes seen within the brain with no acute   intracranial abnormality. XR CHEST (2 VW)   Final Result   Left basilar minor atelectasis or scarring. No CHF or pneumonia.                EKG Interpretation  Interpreted by emergency department physician, Dr. Alia Dave   Sinus rhythm, rate 63, no STEMI, no ischemic change        ------------------------- NURSING NOTES AND VITALS REVIEWED ---------------------------   The nursing notes within the ED encounter and vital signs as below have been reviewed by myself  /76   Pulse 74   Temp 97.1 °F (36.2 °C)   Resp 17   Wt 150 lb (68 kg)   SpO2 100%   BMI 22.81 kg/m²     Oxygen Saturation Interpretation: Normal    The patients available past medical records and past encounters were reviewed. ------------------------------ ED COURSE/MEDICAL DECISION MAKING----------------------  Medications   0.9 % sodium chloride infusion ( IntraVENous New Bag 12/4/22 7629)   potassium chloride 10 mEq/100 mL IVPB (Peripheral Line) (has no administration in time range)           The cardiac monitor revealed SINSU with a heart rate in the 80s as interpreted by me. The cardiac monitor was ordered secondary to the patient's dizzy and to monitor the patient for dysrhythmia. CPT 72136         Medical Decision Making:    Labs and imaging reviewed. Discussed with patient and wife, they feel the patient's not safe going home. Request placement. Discussed with the hospitalist, patient be admitted. Counseling: The emergency provider has spoken with the patient and discussed todays results, in addition to providing specific details for the plan of care and counseling regarding the diagnosis and prognosis. Questions are answered at this time and they are agreeable with the plan.       --------------------------------- IMPRESSION AND DISPOSITION ---------------------------------    IMPRESSION  1. Dizziness        DISPOSITION  Disposition: Admit to telemetry  Patient condition is stable        NOTE: This report was transcribed using voice recognition software.  Every effort was made to ensure accuracy; however, inadvertent computerized transcription errors may be present       Yanick Argueta MD  12/05/22 2688

## 2022-12-05 LAB
BILIRUBIN URINE: NEGATIVE
BLOOD, URINE: NEGATIVE
CLARITY: CLEAR
COLOR: YELLOW
EKG ATRIAL RATE: 63 BPM
EKG P AXIS: 6 DEGREES
EKG P-R INTERVAL: 152 MS
EKG Q-T INTERVAL: 474 MS
EKG QRS DURATION: 98 MS
EKG QTC CALCULATION (BAZETT): 485 MS
EKG R AXIS: 54 DEGREES
EKG T AXIS: -8 DEGREES
EKG VENTRICULAR RATE: 63 BPM
GLUCOSE URINE: NEGATIVE MG/DL
KETONES, URINE: 15 MG/DL
LEUKOCYTE ESTERASE, URINE: NEGATIVE
NITRITE, URINE: NEGATIVE
PH UA: 6 (ref 5–9)
PROTEIN UA: NEGATIVE MG/DL
SPECIFIC GRAVITY UA: 1.02 (ref 1–1.03)
UROBILINOGEN, URINE: 2 E.U./DL

## 2022-12-05 PROCEDURE — 81003 URINALYSIS AUTO W/O SCOPE: CPT

## 2022-12-05 PROCEDURE — 6370000000 HC RX 637 (ALT 250 FOR IP): Performed by: INTERNAL MEDICINE

## 2022-12-05 PROCEDURE — G0378 HOSPITAL OBSERVATION PER HR: HCPCS

## 2022-12-05 PROCEDURE — 97165 OT EVAL LOW COMPLEX 30 MIN: CPT

## 2022-12-05 PROCEDURE — 93010 ELECTROCARDIOGRAM REPORT: CPT | Performed by: INTERNAL MEDICINE

## 2022-12-05 RX ADMIN — LISINOPRIL 20 MG: 20 TABLET ORAL at 09:47

## 2022-12-05 RX ADMIN — SUCRALFATE 1 G: 1 TABLET ORAL at 17:02

## 2022-12-05 RX ADMIN — SUCRALFATE 1 G: 1 TABLET ORAL at 09:47

## 2022-12-05 RX ADMIN — SERTRALINE 25 MG: 50 TABLET, FILM COATED ORAL at 09:47

## 2022-12-05 RX ADMIN — ASPIRIN 81 MG CHEWABLE TABLET 81 MG: 81 TABLET CHEWABLE at 09:47

## 2022-12-05 NOTE — ED NOTES
Department of Emergency Medicine  FIRST PROVIDER TRIAGE NOTE             Independent MLP           12/4/22  10:14 AM EST    Date of Encounter: 12/4/22   MRN: [id number]      HPI: Nahid Catherine is a 74 y.o. male who presents to the ED for Dizziness (Syncopal, frequent falls)   Patient reports to the ED for dizziness, syncope, falls at home. Reports he has this for years however worsened recently over the past month. Nothing new today. ROS: Negative for cp or sob. PE: Gen Appearance/Constitutional: alert  Musculoskeletal: moves all extremities x 4     Initial Plan of Care: All treatment areas with department are currently occupied. Plan to order/Initiate the following while awaiting opening in ED: labs, EKG, and imaging studies.   Initiate Treatment-Testing, Proceed toTreatment Area When Bed Available for ED Attending/MLP to Continue Care    Electronically signed by VINNIE Mcclain   DD: 12/4/22       VINNIE Mcclain  12/04/22 1017
Pt still cannot provide a urine sample.       Jesus Ozuna RN  12/04/22 2041
Recommendation Preamble: The following recommendations were made during the visit:
Detail Level: Zone
Recommendations (Free Text): Favor AK - tenderness concerning for early scc - Advised patient to apply topical 5-fu bid x 2 weeks to the area , if not better RTC Will consider a biopsy if not better.

## 2022-12-05 NOTE — CARE COORDINATION
12/5, Referral made to [facility] to follow. Victor Hugo to call MEMO back on if she can follow patient. Will need PT/OT. MEMO/CM to follow.       Roland Pablo, [facility] Case Management  [phone]

## 2022-12-05 NOTE — H&P
[facility] Internal Medicine  History and Physical      CHIEF COMPLAINT: Fatigue and weakness, dizziness    Reason for Admission: As above    History Obtained From: Patient    PCP :  Hakeem Urrutia MD    [address] [address] / [facility] [address]      HISTORY OF PRESENT ILLNESS:      The patient is a 74 y.o. male has been having issues with dizziness, weakness. Has been having multiple falls at home. The symptoms started after COVID infection. Work-up has been negative so far including blood work, CAT scan of chest and abdomen. He presented to the emergency room because of persisting symptomatology. Patient was evaluated and work-up was pretty much benign. CT scan of the head was negative. Chest x-ray did not show any new acute pathology. Patient was admitted because of basically failing outpatient evaluation and management. His wife is debilitated from scleroderma. Patient was then requesting rehab placement.     Past Medical History:        Diagnosis Date    Diverticulitis     GERD (gastroesophageal reflux disease)     High cholesterol     Hypertension     Rosacea      Past Surgical History:        Procedure Laterality Date    COLONOSCOPY  7/2/2012    polyps removed    COLONOSCOPY  08/04/2014    ENDOSCOPY, COLON, DIAGNOSTIC  11/07/2016    OTHER SURGICAL HISTORY  3/10/14    upper GI    UPPER GASTROINTESTINAL ENDOSCOPY N/A 6/10/2019    EGD BIOPSY performed by Jose Moraes MD at [address] [place] [address]         Medications Prior to Admission:    Medications Prior to Admission: potassium chloride (KLOR-CON M) 20 MEQ extended release tablet, Take 2 tablets by mouth once for 1 dose  sertraline (ZOLOFT) 25 MG tablet, Take 1 tablet by mouth daily  aspirin (ASPIRIN LOW DOSE) 81 MG chewable tablet, Take 1 tablet by mouth daily  lisinopril (PRINIVIL;ZESTRIL) 20 MG tablet, Take 1 tablet by mouth daily  sucralfate (CARAFATE) 1 GM tablet, Take 1 tablet by mouth 2 times daily    Allergies:  Patient has no hematuria  Neurological ROS: no TIA or stroke symptoms  negative    Vitals:  BP (!) 140/68   Pulse 62   Temp 97.7 °F (36.5 °C) (Oral)   Resp 12   Ht 5' 8\" (1.727 m)   Wt 150 lb (68 kg)   SpO2 100%   BMI 22.81 kg/m²     PHYSICAL EXAM:  General:  Awake, alert, oriented X 3. Well developed, well nourished, well groomed. No apparent distress. HEENT:  Normocephalic, atraumatic. Pupils equal, round, reactive to light. No scleral icterus. No conjunctival injection. Neck:  Supple, no carotid bruits  Heart:  RRR,   Lungs:  CTA bilaterally, bilat symmetrical expansion, no wheeze, rales, or rhonchi  Abdomen: Bowel sounds present, soft, nontender, no masses, no organomegaly, no peritoneal signs  Extremities:  No clubbing, cyanosis, or edema  Skin:  Warm and dry, no open lesions or rash  Neuro:  Cranial nerves 2-12 intact, no focal deficits      DATA:     Recent Results (from the past 24 hour(s))   Urinalysis    Collection Time: 12/05/22  5:41 AM   Result Value Ref Range    Color, UA Yellow Straw/Yellow    Clarity, UA Clear Clear    Glucose, Ur Negative Negative mg/dL    Bilirubin Urine Negative Negative    Ketones, Urine 15 (A) Negative mg/dL    Specific Gravity, UA 1.020 1.005 - 1.030    Blood, Urine Negative Negative    pH, UA 6.0 5.0 - 9.0    Protein, UA Negative Negative mg/dL    Urobilinogen, Urine 2.0 (A) <2.0 E.U./dL    Nitrite, Urine Negative Negative    Leukocyte Esterase, Urine Negative Negative       CT Head W/O Contrast   Final Result   Generalized atrophy and chronic changes seen within the brain with no acute   intracranial abnormality. XR CHEST (2 VW)   Final Result   Left basilar minor atelectasis or scarring. No CHF or pneumonia. ASSESSMENT :      Principal Problem:    Ataxia  Resolved Problems:    * No resolved hospital problems.  *  Recent COVID with possible long COVID syndrome  Previous history of alcoholism with no recent alcohol history  Hyperlipidemia  GERD  Hypertension  History of depression    Plan :    Work-up negative   PT OT  Possible rehab placement      Electronically signed by Shivani Donovan MD on 12/5/2022 at 3:20 PM    NOTE: This report was transcribed using voice recognition software.  Every effort was made to ensure accuracy; however, inadvertent transcription errors may be present

## 2022-12-05 NOTE — CARE COORDINATION
12/5,  Patient admitted for Ataxia. SW met with patient at bedside to discuss transition of care/SW role. Patient lives with wife and son in a home that has 5 steps to enter the home. Patient says wife owns a rollator but he does not have any DME. PCP is Dr. Fidel Camarena and Pharmacy is [facility]. Confirmed above information with wife Ronald Chavez. .  Per Ronald Chavez patient has been having falls and being dizzy lately. Discussed no HHC or ALLAN hx. Wife would be ok for patient to go to [facility] if ALLAN would be needed. Second choice would be [facility] on [address]. Patient will need PT/OT which has been ordered. Wife says that patient has been using her [employer] lately for ambulation. Will wait to see if patient will need ALLAN vs going home. SW to follow for further discharge planning needs.       ROSALINDA Jerez  [facility] Case Management  [phone]

## 2022-12-05 NOTE — PROGRESS NOTES
Occupational Therapy  OCCUPATIONAL THERAPY INITIAL EVALUATION      [address] [address] [address]       [id number]                                                  Patient Name: Omer Otto  MRN: [id number]  : 1950  Room: [address] [place] [address]    Evaluating OT: Adeola Toledo [place] #[id number]    Referring Provider[de-identified]  Savana Adames MD   Specific Provider Orders/Date: OT evaluation and treatment 22    Diagnosis:  Ataxia [R27.0]      Pertinent Medical History:  has a past medical history of Diverticulitis, GERD (gastroesophageal reflux disease), High cholesterol, Hypertension, and Rosacea.        Precautions:  Fall Risk, cognition, bed alarm    Assessment of current deficits   [x] Functional mobility   [x]ADLs  [x] Strength               [x]Cognition   [x] Functional transfers   [] IADLs         [x] Safety Awareness   [x]Endurance   [] Fine Coordination              [x] Balance      [] Vision/perception   []Sensation    [x]Gross Motor Coordination  [] ROM  [] Delirium                   [] Motor Control     OT PLAN OF CARE   OT POC based on physician orders, patient diagnosis and results of clinical assessment    Frequency/Duration 1-4 days/wk for 2 weeks PRN   Specific OT Treatment to include:   * Instruction/training on adapted ADL techniques and AE recommendations to increase functional independence within precautions       * Functional transfer/mobility training/DME recommendations for increased independence, safety, and fall prevention  * Patient/Family education to increase follow through with safety techniques and functional independence  * Cognitive retraining/development of therapeutic activities to improve problem solving, judgement, memory, and attention for increased safety/participation in ADL/IADL tasks  * Therapeutic exercise to improve motor endurance, ROM, and functional strength for ADLs/functional transfers  * Therapeutic activities to facilitate/challenge dynamic balance, stand tolerance for increased safety and independence with ADLs  * Therapeutic activities to facilitate gross/fine motor skills for increased independence with ADLs  * Positioning to improve skin integrity, interaction with environment and functional independence      Recommended Adaptive Equipment:  TBD     Home Living:  questionable historian d/t impaired cognition. Pr reported that he lives with wife in a 2 story with 5 step(s) to enter and 1 rail(s); bed/bath on 2nd    Bathroom setup: tub shower with GB   Equipment owned: ww, cane    Prior Level of Function:  questionable historian, Independent  with ADLs ,    Independent with IADLs. Ambulated with cane or walker    Driving: yes  Occupation/leisure: not noted    Pain Level: none    Cognition: A&O: self , pt was confused to place, month (October) year (1997)   Follows multi step directions: fair -   Memory:  poor   Sequencing:  fair    Problem solving:  poor   Judgement/safety:  poor    Functional Assessment:   AM-PAC Daily Activity Raw Score: 17/24     Initial Eval Status  Date: 12/5/22 Treatment Status  Date: STG=LTG  Time frame: 10-14 days   Feeding Stand by Assist   To open packages  Independent    Grooming Minimal Assist   Modified [place]    UB Dressing Minimal Assist   Modified [place]    LB Dressing Minimal Assist   Modified [place]    Bathing Moderate Assist  For safety and balance,  Supervision    Toileting Minimal Assist   Supervision    Bed Mobility  Supine to sit: NT   Sit to supine: NT   Supine to sit:  Independent   Sit to supine: Independent    Functional Transfers Sit to stand: Minimal Assist   Stand to sit: Minimal Assist   Stand pivot: Minimal Assist   Supervision    Functional Mobility Minimal Assist  with ww  Cues for safety and appropriate use of ww  Supervision  with ww   Balance Sitting: Independent      Standing: Minimal Assist   Sitting: Independent Standing: Modified [place]    Activity Tolerance fair  good   Visual/  Perceptual Glasses: reading  Perception:NT          BUE  ROM/Strength/  Fine motor Coordination Hand dominance: R    RUE: ROM WFL     Strength: grossly 4/5      Strength:  WFL     Coordination:   WFL    LUE: ROM  WFL     Strength: grossly 4/5      Strength:  WFL     Coordination: WFL       Hearing: WFL   Sensation:  No c/o numbness or tingling   Tone:  WFL   Edema:  None noted    Comments:   RN cleared patient for OT. Upon arrival, patient was sitting EOB and eating his lunch. Pt was disoriented to place, month, and year. .  Therapist facilitated and instructed pt on adapted  techniques & compensatory strategies to improve safety and independence with basic ADLs, bed mobility,  functional transfers & functional mobility to allow pt to achieve highest level of independence and safely. At end of session, patient sitting EOB with call light and phone within reach, all lines and tubes intact. Pt would benefit from continued skilled OT to increase safety and independence with completion of ADL tasks and functional mobility for improved quality of life. Rehab Potential: Good  for established goals     Patient / Family Goal:  Not stated    Patient and/or family were instructed on functional diagnosis, prognosis/goals and OT plan of care. Demonstrated fair understanding.      Eval Complexity: Low    Time In: 11:30  Time Out: 11:45  Total Treatment Time: 0 minutes    Min Units   OT Eval Low 10736  x     OT Eval Medium 27484      OT Eval High 62025       OT Re-Eval [id number]       Therapeutic Ex 82522       Therapeutic Activities 49731      ADL/Self Care 94325      Orthotic Management 03266       Neuro Re-Ed 71291       Non-Billable Time          Evaluation Time includes thorough review of current medical information, gathering information on past medical history/social history and prior level of function, completion of standardized testing/informal observation of tasks, assessment of data and education on plan of care and goals.             Rafiq Merrill [place] #[id number]

## 2022-12-06 ENCOUNTER — APPOINTMENT (OUTPATIENT)
Dept: MRI IMAGING | Age: 72
End: 2022-12-06
Payer: MEDICARE

## 2022-12-06 PROCEDURE — G0378 HOSPITAL OBSERVATION PER HR: HCPCS

## 2022-12-06 PROCEDURE — 72141 MRI NECK SPINE W/O DYE: CPT

## 2022-12-06 PROCEDURE — 6370000000 HC RX 637 (ALT 250 FOR IP): Performed by: INTERNAL MEDICINE

## 2022-12-06 PROCEDURE — 97161 PT EVAL LOW COMPLEX 20 MIN: CPT

## 2022-12-06 RX ADMIN — LISINOPRIL 20 MG: 20 TABLET ORAL at 09:30

## 2022-12-06 RX ADMIN — SUCRALFATE 1 G: 1 TABLET ORAL at 09:30

## 2022-12-06 RX ADMIN — ASPIRIN 81 MG CHEWABLE TABLET 81 MG: 81 TABLET CHEWABLE at 09:30

## 2022-12-06 RX ADMIN — SERTRALINE 25 MG: 50 TABLET, FILM COATED ORAL at 09:30

## 2022-12-06 RX ADMIN — SUCRALFATE 1 G: 1 TABLET ORAL at 16:56

## 2022-12-06 NOTE — PROGRESS NOTES
Physical Therapy   Initial Assessment     Name: Alissa Teresa  : 1950  MRN: [id number]      Date of Service: 2022    Evaluating PT:  Skylar Alba. Jenn Farfan, [id number]    Room #:  8745/5124-Q  Diagnosis:  Ataxia [R27.0]  PMHx/PSHx:     has a past medical history of Diverticulitis, GERD (gastroesophageal reflux disease), High cholesterol, Hypertension, and Rosacea. has a past surgical history that includes other surgical history (3/10/14); Colonoscopy (2012); Colonoscopy (2014); Endoscopy, colon, diagnostic (2016); and Upper gastrointestinal endoscopy (N/A, 6/10/2019). Precautions:  Fall risk  Equipment Needs:  front wheeled walker    SUBJECTIVE:    Pt lives with his wife and son in a 2 story home with 3 or 5 stairs to enter and 1 rail. Pt ambulated with a [employer] or SPC PTA. OBJECTIVE:   Initial Evaluation  Date: 22 Treatment Short Term/ Long Term   Goals   AM-PAC 6 Clicks      Was pt agreeable to Eval/treatment? Yes     Does pt have pain? Mild neck pain     Bed Mobility  Rolling: Supervision  Supine to sit: SBA  Sit to supine: SBA  Scooting: SBA  Modified Independent    Transfers Sit to stand: SBA  Stand to sit: SBA  Stand pivot: SBA  Modified Independent    Ambulation    200 feet with WW with SBA  >400 feet with AAD with Modified [place]   Stair negotiation: ascended and descended  NT  12 steps with 1 rail with SBA   BLE ROM WNL     BLE Strength Grossly 4/5     Balance Sitting: Independent   Standing: SBA  Modified Independent      Pt is A & O x 2 to self and place  Sensation:  Denied numbness/tingling  Edema:  None noted in BLE    Patient education  Pt educated on benefits of movement, proper use of [employer].     Patient response to education:   Pt verbalized understanding Pt demonstrated skill Pt requires further education in this area   Yes Yes Reinforce as needed     ASSESSMENT:    Conditions Requiring Skilled Therapeutic Intervention:    [x]Decreased strength     []Decreased ROM  [x]Decreased functional mobility  [x]Decreased balance   [x]Decreased endurance   []Decreased posture  []Decreased sensation  []Decreased coordination   []Decreased vision  [x]Decreased safety awareness   []Increased pain       Comments: The pt required no physical assistance at any time, ambulated with flexed posture and slow gait speed, required VCs for technique with bed mobility and transfers as well as instruction to stay within the base of the walker when turning. Treatment:  Patient practiced and was instructed in the following treatment:    Bed mobility training - pt given verbal and tactile cues to facilitate proper sequencing and safety during rolling and supine>sit   Transfer training: verbal cues for hand placement during sit to stand transfer and for anterior weight shift in order to facilitate initiation of the stand. Gait training: verbal cues for sequencing and safety, verbal cues for appropriate step length and positioning within walker with dynamic activities. Pt's/ family goals   1. To return home    Prognosis is good for reaching above PT goals. Patient and or family understand(s) diagnosis, prognosis, and plan of care.   Yes    PHYSICAL THERAPY PLAN OF CARE:    PT POC is established based on physician order and patient diagnosis     Referring provider/PT Order:  Alma Crespo MD  Diagnosis:  Ataxia [R27.0]  Specific instructions for next treatment:  Attempt stair negotiation    Current Treatment Recommendations:     [x] Strengthening to improve independence with functional mobility   [] ROM to improve independence with functional mobility   [x] Balance Training to improve static/dynamic balance and to reduce fall risk  [x] Endurance Training to improve activity tolerance during functional mobility   [x] Transfer Training to improve safety and independence with all functional transfers   [x] Gait Training to improve gait mechanics, endurance and assess need for appropriate assistive device  [x] Stair Training in preparation for safe discharge home and/or into the community   [] Positioning to prevent skin breakdown and contractures  [x] Safety and Education Training   [] Patient/Caregiver Education   [] HEP  [] Other     PT long term treatment goals are located in above grid    Frequency of treatments: 2-5x/week x 1-2 weeks. Time in  1146  Time out  1158    Total Treatment Time  0 minutes     Evaluation Time includes thorough review of current medical information, gathering information on past medical history/social history and prior level of function, completion of standardized testing/informal observation of tasks, assessment of data and education on plan of care and goals. CPT codes:  [x] Low Complexity PT evaluation 37111  [] Moderate Complexity PT evaluation 13164  [] High Complexity PT evaluation 82278  [] PT Re-evaluation 62489  [] Gait training 58957 0 minutes  [] Manual therapy 36144 0 minutes  [] Therapeutic activities 66528 0 minutes  [] Therapeutic exercises 46273 0 minutes  [] Neuromuscular reeducation 04632 0 minutes     Karin Collado.  [place], [place]  [id number]

## 2022-12-06 NOTE — PROGRESS NOTES
Subjective:    Chief complaint:    Feeling about the same  No new complaints  No problems overnight. Denies chest pain, angina, and dyspnea. Denies abdominal pain. Tolerating diet. No nausea or vomiting. Objective:    BP (!) 151/80   Pulse 62   Temp 97.9 °F (36.6 °C) (Oral)   Resp 18   Ht 5' 8\" (1.727 m)   Wt 150 lb (68 kg)   SpO2 97%   BMI 22.81 kg/m²   General : Awake ,alert,no distress. Heart:  RRR, no murmurs, gallops, or rubs. Lungs:  CTA bilaterally, no wheeze, rales or rhonchi  Abd: bowel sounds present, nontender, nondistended, no masses  Extrem:  No clubbing, cyanosis, or edema    CBC:   Lab Results   Component Value Date/Time    WBC 7.9 12/04/2022 12:38 PM    RBC 3.85 12/04/2022 12:38 PM    HGB 11.6 12/04/2022 12:38 PM    HCT 34.3 12/04/2022 12:38 PM    MCV 89.1 12/04/2022 12:38 PM    MCH 30.1 12/04/2022 12:38 PM    MCHC 33.8 12/04/2022 12:38 PM    RDW 13.5 12/04/2022 12:38 PM     12/04/2022 12:38 PM    MPV 8.9 12/04/2022 12:38 PM     BMP:    Lab Results   Component Value Date/Time     12/04/2022 12:38 PM    K 3.3 12/04/2022 12:38 PM    K 3.7 10/24/2021 08:30 PM    CL 96 12/04/2022 12:38 PM    CO2 22 12/04/2022 12:38 PM    BUN 9 12/04/2022 12:38 PM    LABALBU 3.7 12/04/2022 12:38 PM    LABALBU 4.2 06/28/2011 12:00 PM    CREATININE 0.9 12/04/2022 12:38 PM    CALCIUM 9.7 12/04/2022 12:38 PM    GFRAA >60 08/03/2022 09:39 AM    LABGLOM >60 12/04/2022 12:38 PM    GLUCOSE 98 12/04/2022 12:38 PM    GLUCOSE 90 06/28/2011 12:00 PM     PT/INR:  No results found for: PROTIME, INR  Troponin:  No results found for: TROPONINI    No results for input(s): Verito Henson in the last 72 hours. No results for input(s): BC in the last 72 hours. No results for input(s): Kit Oniel in the last 72 hours.       Current Facility-Administered Medications:     0.9 % sodium chloride infusion, , IntraVENous, Continuous, Елена Allen MD, Last Rate: 100 mL/hr at 12/04/22 2338, New Bag at 12/04/22 2338 sucralfate (CARAFATE) tablet 1 g, 1 g, Oral, BID, Donato Monterroso MD, 1 g at 12/06/22 0930    aspirin chewable tablet 81 mg, 81 mg, Oral, Daily, Donato Monterroso MD, 81 mg at 12/06/22 0930    lisinopril (PRINIVIL;ZESTRIL) tablet 20 mg, 20 mg, Oral, Daily, Donato Monterroso MD, 20 mg at 12/06/22 0930    sertraline (ZOLOFT) tablet 25 mg, 25 mg, Oral, Daily, Donato Monterroso MD, 25 mg at 12/06/22 0930    ADULT DIET; Regular    CT Head W/O Contrast   Final Result   Generalized atrophy and chronic changes seen within the brain with no acute   intracranial abnormality. XR CHEST (2 VW)   Final Result   Left basilar minor atelectasis or scarring. No CHF or pneumonia. Assessment:    Principal Problem:    Ataxia  Resolved Problems:    * No resolved hospital problems. *    Recent COVID infection with likely long COVID symptoms  Plan:    Given ongoing dizziness and ataxia we will check C-spine MRI  Await acceptance to subacute rehab and pre-CERT process        Donato Monterroso MD  1:32 PM  12/6/2022    NOTE: This report was transcribed using voice recognition software.  Every effort was made to ensure accuracy; however, inadvertent transcription errors may be present

## 2022-12-07 VITALS
DIASTOLIC BLOOD PRESSURE: 80 MMHG | HEART RATE: 67 BPM | RESPIRATION RATE: 14 BRPM | SYSTOLIC BLOOD PRESSURE: 143 MMHG | BODY MASS INDEX: 22.73 KG/M2 | WEIGHT: 150 LBS | HEIGHT: 68 IN | TEMPERATURE: 97.9 F | OXYGEN SATURATION: 99 %

## 2022-12-07 LAB
ANION GAP SERPL CALCULATED.3IONS-SCNC: 12 MMOL/L (ref 7–16)
BUN BLDV-MCNC: 5 MG/DL (ref 6–23)
CALCIUM SERPL-MCNC: 9 MG/DL (ref 8.6–10.2)
CHLORIDE BLD-SCNC: 98 MMOL/L (ref 98–107)
CO2: 23 MMOL/L (ref 22–29)
CREAT SERPL-MCNC: 0.7 MG/DL (ref 0.7–1.2)
GFR SERPL CREATININE-BSD FRML MDRD: >60 ML/MIN/1.73
GLUCOSE BLD-MCNC: 115 MG/DL (ref 74–99)
POTASSIUM SERPL-SCNC: 2.9 MMOL/L (ref 3.5–5)
SODIUM BLD-SCNC: 133 MMOL/L (ref 132–146)

## 2022-12-07 PROCEDURE — 36415 COLL VENOUS BLD VENIPUNCTURE: CPT

## 2022-12-07 PROCEDURE — G0378 HOSPITAL OBSERVATION PER HR: HCPCS

## 2022-12-07 PROCEDURE — 6370000000 HC RX 637 (ALT 250 FOR IP): Performed by: INTERNAL MEDICINE

## 2022-12-07 PROCEDURE — 80048 BASIC METABOLIC PNL TOTAL CA: CPT

## 2022-12-07 RX ORDER — POTASSIUM CHLORIDE 20 MEQ/1
40 TABLET, EXTENDED RELEASE ORAL ONCE
Status: COMPLETED | OUTPATIENT
Start: 2022-12-07 | End: 2022-12-07

## 2022-12-07 RX ADMIN — ASPIRIN 81 MG CHEWABLE TABLET 81 MG: 81 TABLET CHEWABLE at 09:15

## 2022-12-07 RX ADMIN — LISINOPRIL 20 MG: 20 TABLET ORAL at 09:16

## 2022-12-07 RX ADMIN — SUCRALFATE 1 G: 1 TABLET ORAL at 09:15

## 2022-12-07 RX ADMIN — POTASSIUM CHLORIDE 40 MEQ: 1500 TABLET, EXTENDED RELEASE ORAL at 16:36

## 2022-12-07 RX ADMIN — SUCRALFATE 1 G: 1 TABLET ORAL at 16:36

## 2022-12-07 RX ADMIN — SERTRALINE 25 MG: 50 TABLET, FILM COATED ORAL at 09:15

## 2022-12-07 NOTE — CARE COORDINATION
12/7, MEMO spoke with Olivia Zavalaman from [place] on patient and recent PT which was 17/24 and ambulated 200 feet. OT was 17/24. Discussed patient being good good for rehab at this time. Referral made to Vanesa at [facility]. Vanesa to get back to this worker on referral.  Call placed to [address] [place] [address]. VM left for Shama Rutledge to return call on PT/OT evals being too good for patient to go to rehab and looking to have patient return home with [facility] once medically cleared for discharge. Will need [facility] order for SN PT OT. SW to follow for further discharge planning needs.       Dorothy Bardales, [facility]  [facility] Case Management  [phone]

## 2022-12-07 NOTE — CARE COORDINATION
12/7, SW spoke with Vanesa from [place] and patient has been accepted for [address] [address]. Will need C order for patient upon discharge. Nursing updated. SW to follow for further discharge planning needs.       Colleen Estrada, [facility] Case Management  [phone]

## 2022-12-07 NOTE — PROGRESS NOTES
Subjective:    Chief complaint:    Feeling about the same  Denies new issues    Objective:    BP (!) 148/71   Pulse 66   Temp 99.2 °F (37.3 °C) (Tympanic)   Resp 12   Ht 5' 8\" (1.727 m)   Wt 150 lb (68 kg)   SpO2 100%   BMI 22.81 kg/m²   General : Awake ,alert,no distress. Heart:  RRR, no murmurs, gallops, or rubs. Lungs:  CTA bilaterally, no wheeze, rales or rhonchi  Abd: bowel sounds present, nontender, nondistended, no masses  Extrem:  No clubbing, cyanosis, or edema    CBC:   Lab Results   Component Value Date/Time    WBC 7.9 12/04/2022 12:38 PM    RBC 3.85 12/04/2022 12:38 PM    HGB 11.6 12/04/2022 12:38 PM    HCT 34.3 12/04/2022 12:38 PM    MCV 89.1 12/04/2022 12:38 PM    MCH 30.1 12/04/2022 12:38 PM    MCHC 33.8 12/04/2022 12:38 PM    RDW 13.5 12/04/2022 12:38 PM     12/04/2022 12:38 PM    MPV 8.9 12/04/2022 12:38 PM     BMP:    Lab Results   Component Value Date/Time     12/04/2022 12:38 PM    K 3.3 12/04/2022 12:38 PM    K 3.7 10/24/2021 08:30 PM    CL 96 12/04/2022 12:38 PM    CO2 22 12/04/2022 12:38 PM    BUN 9 12/04/2022 12:38 PM    LABALBU 3.7 12/04/2022 12:38 PM    LABALBU 4.2 06/28/2011 12:00 PM    CREATININE 0.9 12/04/2022 12:38 PM    CALCIUM 9.7 12/04/2022 12:38 PM    GFRAA >60 08/03/2022 09:39 AM    LABGLOM >60 12/04/2022 12:38 PM    GLUCOSE 98 12/04/2022 12:38 PM    GLUCOSE 90 06/28/2011 12:00 PM     PT/INR:  No results found for: PROTIME, INR  Troponin:  No results found for: TROPONINI    No results for input(s): Render Vinh in the last 72 hours. No results for input(s): BC in the last 72 hours. No results for input(s): [place] in the last 72 hours.       Current Facility-Administered Medications:     sucralfate (CARAFATE) tablet 1 g, 1 g, Oral, BID, Carolyn Meza MD, 1 g at 12/07/22 0915    aspirin chewable tablet 81 mg, 81 mg, Oral, Daily, Carolyn Meza MD, 81 mg at 12/07/22 0915    lisinopril (PRINIVIL;ZESTRIL) tablet 20 mg, 20 mg, Oral, Daily, Viktor BUENO James Barraza MD, 20 mg at 12/07/22 0916    sertraline (ZOLOFT) tablet 25 mg, 25 mg, Oral, Daily, Josie Powers MD, 25 mg at 12/07/22 0915    ADULT DIET; Regular    MRI CERVICAL SPINE WO CONTRAST   Final Result   Multilevel moderate to degenerative disc disease in cervical spine. Multilevel moderate to marked facet osteoarthritis in the cervical spine. No significant central canal stenosis. No evidence of cervical spinal cord   compression. No abnormal signal in the cervical spinal cord. Evidence of moderate to marked multilevel neural foraminal stenosis   bilaterally in cervical spine, as described in details in body of report. CT Head W/O Contrast   Final Result   Generalized atrophy and chronic changes seen within the brain with no acute   intracranial abnormality. XR CHEST (2 VW)   Final Result   Left basilar minor atelectasis or scarring. No CHF or pneumonia. Assessment:    Principal Problem:    Ataxia  Resolved Problems:    * No resolved hospital problems. *    Recent COVID infection with likely long COVID symptoms  Plan:    MRI of cervical spine results noted  Patient apparently does not qualify for subacute rehab  Discharge home with home care  Josie Powers MD  2:38 PM  12/7/2022    NOTE: This report was transcribed using voice recognition software.  Every effort was made to ensure accuracy; however, inadvertent transcription errors may be present

## 2022-12-19 ENCOUNTER — HOSPITAL ENCOUNTER (INPATIENT)
Age: 72
LOS: 3 days | Discharge: OTHER INSTITUTION | End: 2022-12-23
Attending: EMERGENCY MEDICINE | Admitting: INTERNAL MEDICINE
Payer: MEDICARE

## 2022-12-19 ENCOUNTER — APPOINTMENT (OUTPATIENT)
Dept: CT IMAGING | Age: 72
End: 2022-12-19
Payer: MEDICARE

## 2022-12-19 ENCOUNTER — APPOINTMENT (OUTPATIENT)
Dept: GENERAL RADIOLOGY | Age: 72
End: 2022-12-19
Payer: MEDICARE
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
## 2022-12-19 DIAGNOSIS — N17.9 AKI (ACUTE KIDNEY INJURY) (HCC): ICD-10-CM

## 2022-12-19 DIAGNOSIS — I60.9 SAH (SUBARACHNOID HEMORRHAGE) (HCC): ICD-10-CM

## 2022-12-19 LAB
ALBUMIN SERPL-MCNC: 3.7 G/DL (ref 3.5–5.2)
ALP BLD-CCNC: 103 U/L (ref 40–129)
ALT SERPL-CCNC: 11 U/L (ref 0–40)
ANION GAP SERPL CALCULATED.3IONS-SCNC: 23 MMOL/L (ref 7–16)
AST SERPL-CCNC: 20 U/L (ref 0–39)
BASOPHILS ABSOLUTE: 0 E9/L (ref 0–0.2)
BASOPHILS RELATIVE PERCENT: 0.1 % (ref 0–2)
BILIRUB SERPL-MCNC: 1.1 MG/DL (ref 0–1.2)
BUN BLDV-MCNC: 16 MG/DL (ref 6–23)
CALCIUM SERPL-MCNC: 10 MG/DL (ref 8.6–10.2)
CHLORIDE BLD-SCNC: 95 MMOL/L (ref 98–107)
CO2: 21 MMOL/L (ref 22–29)
CREAT SERPL-MCNC: 1.6 MG/DL (ref 0.7–1.2)
EOSINOPHILS ABSOLUTE: 0 E9/L (ref 0.05–0.5)
EOSINOPHILS RELATIVE PERCENT: 0 % (ref 0–6)
GFR SERPL CREATININE-BSD FRML MDRD: 45 ML/MIN/1.73
GLUCOSE BLD-MCNC: 136 MG/DL (ref 74–99)
HCT VFR BLD CALC: 33.7 % (ref 37–54)
HEMOGLOBIN: 11.7 G/DL (ref 12.5–16.5)
LACTIC ACID: 8.2 MMOL/L (ref 0.5–2.2)
LYMPHOCYTES ABSOLUTE: 0.24 E9/L (ref 1.5–4)
LYMPHOCYTES RELATIVE PERCENT: 1.7 % (ref 20–42)
MAGNESIUM: 1.7 MG/DL (ref 1.6–2.6)
MCH RBC QN AUTO: 30 PG (ref 26–35)
MCHC RBC AUTO-ENTMCNC: 34.7 % (ref 32–34.5)
MCV RBC AUTO: 86.4 FL (ref 80–99.9)
MONOCYTES ABSOLUTE: 0.61 E9/L (ref 0.1–0.95)
MONOCYTES RELATIVE PERCENT: 5.2 % (ref 2–12)
NEUTROPHILS ABSOLUTE: 11.35 E9/L (ref 1.8–7.3)
NEUTROPHILS RELATIVE PERCENT: 93.1 % (ref 43–80)
OVALOCYTES: ABNORMAL
PDW BLD-RTO: 13.9 FL (ref 11.5–15)
PLATELET # BLD: 163 E9/L (ref 130–450)
PMV BLD AUTO: 9.2 FL (ref 7–12)
POIKILOCYTES: ABNORMAL
POTASSIUM SERPL-SCNC: 2.7 MMOL/L (ref 3.5–5)
RBC # BLD: 3.9 E12/L (ref 3.8–5.8)
SODIUM BLD-SCNC: 139 MMOL/L (ref 132–146)
TOTAL PROTEIN: 6.8 G/DL (ref 6.4–8.3)
TROPONIN, HIGH SENSITIVITY: 74 NG/L (ref 0–11)
WBC # BLD: 12.2 E9/L (ref 4.5–11.5)

## 2022-12-19 PROCEDURE — 70450 CT HEAD/BRAIN W/O DYE: CPT | Performed by: RADIOLOGY

## 2022-12-19 PROCEDURE — 36415 COLL VENOUS BLD VENIPUNCTURE: CPT

## 2022-12-19 PROCEDURE — 83605 ASSAY OF LACTIC ACID: CPT

## 2022-12-19 PROCEDURE — 84484 ASSAY OF TROPONIN QUANT: CPT

## 2022-12-19 PROCEDURE — 71045 X-RAY EXAM CHEST 1 VIEW: CPT

## 2022-12-19 PROCEDURE — 74176 CT ABD & PELVIS W/O CONTRAST: CPT

## 2022-12-19 PROCEDURE — 70450 CT HEAD/BRAIN W/O DYE: CPT

## 2022-12-19 PROCEDURE — 2580000003 HC RX 258: Performed by: EMERGENCY MEDICINE

## 2022-12-19 PROCEDURE — 80053 COMPREHEN METABOLIC PANEL: CPT

## 2022-12-19 PROCEDURE — 83735 ASSAY OF MAGNESIUM: CPT

## 2022-12-19 PROCEDURE — 6360000002 HC RX W HCPCS: Performed by: EMERGENCY MEDICINE

## 2022-12-19 PROCEDURE — 85025 COMPLETE CBC W/AUTO DIFF WBC: CPT

## 2022-12-19 PROCEDURE — 72125 CT NECK SPINE W/O DYE: CPT | Performed by: RADIOLOGY

## 2022-12-19 PROCEDURE — 72125 CT NECK SPINE W/O DYE: CPT

## 2022-12-19 PROCEDURE — 93005 ELECTROCARDIOGRAM TRACING: CPT | Performed by: EMERGENCY MEDICINE

## 2022-12-19 PROCEDURE — 51702 INSERT TEMP BLADDER CATH: CPT

## 2022-12-19 PROCEDURE — 99285 EMERGENCY DEPT VISIT HI MDM: CPT

## 2022-12-19 RX ORDER — POTASSIUM CHLORIDE 7.45 MG/ML
10 INJECTION INTRAVENOUS ONCE
Status: COMPLETED | OUTPATIENT
Start: 2022-12-19 | End: 2022-12-19

## 2022-12-19 RX ORDER — SODIUM CHLORIDE 9 MG/ML
INJECTION, SOLUTION INTRAVENOUS CONTINUOUS
Status: DISCONTINUED | OUTPATIENT
Start: 2022-12-19 | End: 2022-12-20 | Stop reason: SDUPTHER

## 2022-12-19 RX ORDER — 0.9 % SODIUM CHLORIDE 0.9 %
1000 INTRAVENOUS SOLUTION INTRAVENOUS ONCE
Status: COMPLETED | OUTPATIENT
Start: 2022-12-19 | End: 2022-12-19

## 2022-12-19 RX ADMIN — SODIUM CHLORIDE 1000 ML: 9 INJECTION, SOLUTION INTRAVENOUS at 20:13

## 2022-12-19 RX ADMIN — POTASSIUM CHLORIDE 10 MEQ: 7.46 INJECTION, SOLUTION INTRAVENOUS at 22:11

## 2022-12-19 RX ADMIN — SODIUM CHLORIDE: 9 INJECTION, SOLUTION INTRAVENOUS at 22:10

## 2022-12-19 ASSESSMENT — PAIN - FUNCTIONAL ASSESSMENT: PAIN_FUNCTIONAL_ASSESSMENT: NONE - DENIES PAIN

## 2022-12-19 NOTE — CARE COORDINATION
12/19, MEMO received call from Lui Hdz wife on [place] not contacting patient on [facility]. Patient is currently home from hospital.  Call placed to Vanesa from [address],[employer]. Vanesa to look into referral and see why Deni did not start services with patient.   Vanesa to contact wife back on referral.      Bean Rodriguez [facility] Case Management  [phone]

## 2022-12-19 NOTE — LETTER
[place] Department Medicaid  CERTIFICATION OF NECESSITY  FOR NON-EMERGENCY TRANSPORTATION   BY GROUND AMBULANCE      Individual Information   1. Name: Cedric Doctor # [id number]     1. Address: [address] [place] [address]  [address] [facility]      Transportation Provider Information   4. Provider Name:    5. [place] Medicaid Provider Number:   National Provider Identifier (NPI):       Certification  7. Criteria:  During transport, this individual requires:  [x] Medical treatment or continuous     supervision by an EMT. [] The administration or regulation of oxygen by another person. [] Supervised protective restraint. 8. Period Beginning Date:     9. Length  [x] Not more than 1 day(s)  [] One Year     Additional Information Relevant to Certification   10. Comments or Explanations, If Necessary or Appropriate   Traumatic subarachnoid hemorrhage with unknown loss of consciousness status    Facial contusion    Nail avulsion of toe    SAH (subarachnoid hemorrhage) AT RISK FOR FALL, POOR TRUNK CONTROL, DECONDITIONED AND SEVERE MUSCLE WEAKNESS      Certifying Practitioner Information   11. Name of Practitioner: Manjit Padron M.D.    12. [facility] Provider Number, If Applicable:   [address] Provider Identifier (NPI):       Signature Information   14. Date of Signature:   15. Name of Person Signing: Stefan Carvalho    12. Signature and Professional Designation:       [facility] [phone]  Rev. 2015  Edson Choi : 1950 (72 yrs)    Address: [address] [place] [address] Sex: Male   [place] [id number]         Marital Status:    Employer: RETIRED         Sabianism: Orthodox   Primary Care Provider: Josie Powers MD         Primary Phone: [phone]   EMERGENCY CONTACT   Contact Name Legal Guardian? Relationship to Patient Home Phone Work Phone   1. Stacy Weber  2.  Phillip Young      Spouse  Child [phone]              GUARANTOR            Guarantor: Juani Andres     : 1950   Address: [address] [place] [address] Sex: Male   [place] [address]     Relation to Patient: Self       Home Phone: [phone]   Guarantor ID: [id number]       Work Phone:     Guarantor Employer: RETIRED         Status: RETIRED      COVERAGE        PRIMARY INSURANCE   Payor: [facility] MEDICARE Plan: [employer]   Payor Address: ,          Group Number: [id number] Insurance Type: [address] Name: Romelia Hope : 1950   Subscriber ID: [id number] Pat.  Rel. to Sub: Self

## 2022-12-19 NOTE — LETTER
[address] Medicaid  CERTIFICATION OF NECESSITY  FOR TRANSPORTATION   BY WHEELCHAIR VAN     Individual Information   1. Name: Olivia Cherry 2. [place] Medicaid Billing Number: facility    3. Address: [address] [place] [address]  [address] [facility]      Transportation Provider Information   4. Provider Name:     5. [place] Medicaid Provider Number:   National Provider Identifier (NPI):       Certification  7. Criteria:  By signing this document, the practitioner certifies that two statements are true:  A. This individual must be accompanied by a mobility-related assistive device from the point of pick-up to the point of drop-off. B. Transport of this individual by standard passenger vehicle or common carrier is precluded or contraindicated. 8. Period Beginning Date:     9. Length  [x] Not more than 1 day(s)  [] One Year     Additional Information Relevant to Certification   10. Comments or Explanations, If Necessary or Appropriate:   Traumatic subarachnoid hemorrhage with unknown loss of consciousness status    Facial contusion    Nail avulsion of toe    SAH (subarachnoid hemorrhage)     Certifying Practitioner Information   11. Name of Practitioner: Cali Zamora M.D.    12. [facility] Provider Number, If Applicable:   [address] Provider Identifier (NPI):       Signature Information   14. Date of Signature:   15. Name of Person Signing: Callie Che    12. Signature and Professional Designation:       [facility] [id number]  Rev. 2015  #           Olivia Cherry : 1950 (72 yrs)    Address: [address] [place] [address] Sex: Male   [place] [id number]         Marital Status:    Employer: RETIRED         Religious: Sabianism   Primary Care Provider: Khang Cook MD         Primary Phone: [phone]   EMERGENCY CONTACT   Contact Name Legal Guardian? Relationship to Patient Home Phone Work Phone   1. Stacy Weber  2.  Gurinder Weber III      Spouse  Child [phone]              GUARANTOR            Guarantor: Jorge A Taylor     : 1950   Address: [address] [place] [address] Sex: Male   [address]     Relation to Patient: Self       Home Phone: [phone]   Guarantor ID: [id number]       Work Phone:     Guarantor Employer: RETIRED         Status: RETIRED      COVERAGE        PRIMARY INSURANCE   Payor: [facility] MEDICARE Plan: [facility]   Payor Address: ,          Group Number: [id number] Insurance Type: [address] Name: Brittany Andrade : 1950   Subscriber ID: [id number] Pat.  Rel. to Sub: Self

## 2022-12-20 ENCOUNTER — APPOINTMENT (OUTPATIENT)
Dept: GENERAL RADIOLOGY | Age: 72
End: 2022-12-20
Payer: MEDICARE

## 2022-12-20 ENCOUNTER — APPOINTMENT (OUTPATIENT)
Dept: CT IMAGING | Age: 72
End: 2022-12-20
Payer: MEDICARE

## 2022-12-20 PROBLEM — S06.6XAA TRAUMATIC SUBARACHNOID HEMORRHAGE WITH UNKNOWN LOSS OF CONSCIOUSNESS STATUS: Status: ACTIVE | Noted: 2022-12-20

## 2022-12-20 PROBLEM — I60.9 SAH (SUBARACHNOID HEMORRHAGE) (HCC): Status: ACTIVE | Noted: 2022-01-01

## 2022-12-20 PROBLEM — S00.83XA FACIAL CONTUSION: Status: ACTIVE | Noted: 2022-01-01

## 2022-12-20 PROBLEM — S91.209A NAIL AVULSION OF TOE: Status: ACTIVE | Noted: 2022-01-01
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
## 2022-12-20 LAB
ABO/RH: NORMAL
ANGLE (CLOT STRENGTH): 66.5 DEGREE (ref 59–74)
ANION GAP SERPL CALCULATED.3IONS-SCNC: 13 MMOL/L (ref 7–16)
ANTIBODY SCREEN: NORMAL
APTT: 25.8 SEC (ref 24.5–35.1)
BACTERIA: ABNORMAL /HPF
BILIRUBIN URINE: NEGATIVE
BLOOD, URINE: ABNORMAL
BUN BLDV-MCNC: 14 MG/DL (ref 6–23)
CALCIUM SERPL-MCNC: 8.2 MG/DL (ref 8.6–10.2)
CHLORIDE BLD-SCNC: 105 MMOL/L (ref 98–107)
CLARITY: CLEAR
CO2: 22 MMOL/L (ref 22–29)
COLOR: YELLOW
CREAT SERPL-MCNC: 1.2 MG/DL (ref 0.7–1.2)
EPL-TEG: 0 % (ref 0–15)
G-TEG: 6.6 K D/SC (ref 4.5–11)
GFR SERPL CREATININE-BSD FRML MDRD: >60 ML/MIN/1.73
GLUCOSE BLD-MCNC: 101 MG/DL (ref 74–99)
GLUCOSE URINE: 100 MG/DL
INR BLD: 1.2
K (CLOTTING TIME): 2.1 MIN (ref 1–3)
KETONES, URINE: ABNORMAL MG/DL
LACTIC ACID: 1.6 MMOL/L (ref 0.5–2.2)
LACTIC ACID: 2.4 MMOL/L (ref 0.5–2.2)
LEUKOCYTE ESTERASE, URINE: NEGATIVE
LY30 (FIBRINOLYSIS): 0 % (ref 0–8)
MA (MAX AMPLITUDE): 56.8 MM (ref 50–70)
NITRITE, URINE: NEGATIVE
PH UA: 7 (ref 5–9)
POTASSIUM SERPL-SCNC: 2.5 MMOL/L (ref 3.5–5)
POTASSIUM SERPL-SCNC: 3 MMOL/L (ref 3.5–5)
PROTEIN UA: ABNORMAL MG/DL
PROTHROMBIN TIME: 13.3 SEC (ref 9.3–12.4)
R (REACTION TIME): 2.4 MIN (ref 5–10)
RBC UA: ABNORMAL /HPF (ref 0–2)
SODIUM BLD-SCNC: 140 MMOL/L (ref 132–146)
SPECIFIC GRAVITY UA: 1.01 (ref 1–1.03)
UROBILINOGEN, URINE: 1 E.U./DL
WBC UA: ABNORMAL /HPF (ref 0–5)

## 2022-12-20 PROCEDURE — 99222 1ST HOSP IP/OBS MODERATE 55: CPT | Performed by: NEUROLOGICAL SURGERY

## 2022-12-20 PROCEDURE — 6370000000 HC RX 637 (ALT 250 FOR IP): Performed by: PODIATRIST

## 2022-12-20 PROCEDURE — 86901 BLOOD TYPING SEROLOGIC RH(D): CPT

## 2022-12-20 PROCEDURE — 85384 FIBRINOGEN ACTIVITY: CPT

## 2022-12-20 PROCEDURE — 83605 ASSAY OF LACTIC ACID: CPT

## 2022-12-20 PROCEDURE — 6360000002 HC RX W HCPCS: Performed by: INTERNAL MEDICINE

## 2022-12-20 PROCEDURE — 6370000000 HC RX 637 (ALT 250 FOR IP)

## 2022-12-20 PROCEDURE — 6370000000 HC RX 637 (ALT 250 FOR IP): Performed by: INTERNAL MEDICINE

## 2022-12-20 PROCEDURE — 6360000002 HC RX W HCPCS: Performed by: EMERGENCY MEDICINE

## 2022-12-20 PROCEDURE — 85730 THROMBOPLASTIN TIME PARTIAL: CPT

## 2022-12-20 PROCEDURE — 85610 PROTHROMBIN TIME: CPT

## 2022-12-20 PROCEDURE — 86850 RBC ANTIBODY SCREEN: CPT

## 2022-12-20 PROCEDURE — 84132 ASSAY OF SERUM POTASSIUM: CPT

## 2022-12-20 PROCEDURE — 70450 CT HEAD/BRAIN W/O DYE: CPT

## 2022-12-20 PROCEDURE — 99233 SBSQ HOSP IP/OBS HIGH 50: CPT | Performed by: SURGERY

## 2022-12-20 PROCEDURE — 2580000003 HC RX 258: Performed by: INTERNAL MEDICINE

## 2022-12-20 PROCEDURE — 86900 BLOOD TYPING SEROLOGIC ABO: CPT

## 2022-12-20 PROCEDURE — 2060000000 HC ICU INTERMEDIATE R&B

## 2022-12-20 PROCEDURE — 73630 X-RAY EXAM OF FOOT: CPT

## 2022-12-20 PROCEDURE — 85576 BLOOD PLATELET AGGREGATION: CPT

## 2022-12-20 PROCEDURE — 80048 BASIC METABOLIC PNL TOTAL CA: CPT

## 2022-12-20 PROCEDURE — 81001 URINALYSIS AUTO W/SCOPE: CPT

## 2022-12-20 PROCEDURE — 85347 COAGULATION TIME ACTIVATED: CPT

## 2022-12-20 PROCEDURE — 36415 COLL VENOUS BLD VENIPUNCTURE: CPT

## 2022-12-20 RX ORDER — LEVETIRACETAM 5 MG/ML
500 INJECTION INTRAVASCULAR EVERY 12 HOURS
Status: DISCONTINUED | OUTPATIENT
Start: 2022-12-20 | End: 2022-12-23 | Stop reason: HOSPADM

## 2022-12-20 RX ORDER — SODIUM CHLORIDE 9 MG/ML
INJECTION, SOLUTION INTRAVENOUS CONTINUOUS
Status: DISCONTINUED | OUTPATIENT
Start: 2022-12-20 | End: 2022-12-20

## 2022-12-20 RX ORDER — SODIUM CHLORIDE AND POTASSIUM CHLORIDE 150; 900 MG/100ML; MG/100ML
INJECTION, SOLUTION INTRAVENOUS CONTINUOUS
Status: DISCONTINUED | OUTPATIENT
Start: 2022-12-20 | End: 2022-12-23 | Stop reason: HOSPADM

## 2022-12-20 RX ORDER — LEVETIRACETAM 500 MG/1
500 TABLET ORAL EVERY 12 HOURS
Status: DISCONTINUED | OUTPATIENT
Start: 2022-12-20 | End: 2022-12-23 | Stop reason: HOSPADM

## 2022-12-20 RX ORDER — LEVETIRACETAM 5 MG/ML
500 INJECTION INTRAVASCULAR ONCE
Status: COMPLETED | OUTPATIENT
Start: 2022-12-20 | End: 2022-12-20

## 2022-12-20 RX ORDER — ASPIRIN 81 MG/1
81 TABLET, CHEWABLE ORAL DAILY
Status: DISCONTINUED | OUTPATIENT
Start: 2022-12-20 | End: 2022-12-23 | Stop reason: HOSPADM

## 2022-12-20 RX ORDER — SUCRALFATE 1 G/1
1 TABLET ORAL 2 TIMES DAILY
Status: DISCONTINUED | OUTPATIENT
Start: 2022-12-20 | End: 2022-12-23 | Stop reason: HOSPADM

## 2022-12-20 RX ORDER — SERTRALINE HYDROCHLORIDE 25 MG/1
25 TABLET, FILM COATED ORAL DAILY
Status: DISCONTINUED | OUTPATIENT
Start: 2022-12-20 | End: 2022-12-23 | Stop reason: HOSPADM

## 2022-12-20 RX ADMIN — SODIUM CHLORIDE: 9 INJECTION, SOLUTION INTRAVENOUS at 05:05

## 2022-12-20 RX ADMIN — LEVETIRACETAM 500 MG: 5 INJECTION INTRAVENOUS at 00:42

## 2022-12-20 RX ADMIN — POTASSIUM CHLORIDE AND SODIUM CHLORIDE: 900; 150 INJECTION, SOLUTION INTRAVENOUS at 10:07

## 2022-12-20 RX ADMIN — LEVETIRACETAM 500 MG: 500 TABLET, FILM COATED ORAL at 14:10

## 2022-12-20 RX ADMIN — MUPIROCIN: 20 OINTMENT TOPICAL at 19:05

## 2022-12-20 RX ADMIN — SERTRALINE 25 MG: 50 TABLET, FILM COATED ORAL at 10:06

## 2022-12-20 ASSESSMENT — ENCOUNTER SYMPTOMS
ALLERGIC/IMMUNOLOGIC NEGATIVE: 1
GASTROINTESTINAL NEGATIVE: 1
EYES NEGATIVE: 1
RESPIRATORY NEGATIVE: 1

## 2022-12-20 ASSESSMENT — PAIN SCALES - GENERAL
PAINLEVEL_OUTOF10: 0
PAINLEVEL_OUTOF10: 0

## 2022-12-20 NOTE — ED NOTES
Patient passed nursing bedside swallow. Patient eating turkey sandwich. Nurse at bedside monitoring. Nurse continues to have to remind patient to swallow bites and small sips of liquid between bites.      Kinga Jordan RN  12/20/22 8636

## 2022-12-20 NOTE — H&P
[facility]  History and Physical      CHIEF COMPLAINT: Fall    Reason for Admission: Fall    History Obtained From: Patient    PCP :  Desi Morrow MD    [address] [facility] [address] [facility] [place] [address]      HISTORY OF PRESENT ILLNESS:      The patient is a 74 y.o. male presents to the emergency room after falling down the stairs. He slipped and fell. He hit his head on the floor. There was no loss of consciousness. There is an abrasion in the frontal area. He was brought to the emergency room. He was noted to have small subarachnoid hemorrhage. Patient is being admitted because of lactic acidosis, acute kidney injury, hypokalemia. At the time of questioning patient is awake and alert. Denies new issues. He also hit his left big toe and had a inversion injury to the toenail. Denying any significant pain. Past Medical History:        Diagnosis Date    Diverticulitis     GERD (gastroesophageal reflux disease)     High cholesterol     Hypertension     Rosacea      Past Surgical History:        Procedure Laterality Date    COLONOSCOPY  2012    polyps removed    COLONOSCOPY  2014    ENDOSCOPY, COLON, DIAGNOSTIC  2016    OTHER SURGICAL HISTORY  3/10/14    upper GI    UPPER GASTROINTESTINAL ENDOSCOPY N/A 6/10/2019    EGD BIOPSY performed by Glenn Acuna MD at [facility] ENDOSCOPY         Medications Prior to Admission:    Not in a hospital admission. Allergies:  Patient has no known allergies.     Social History:   Social History     Socioeconomic History    Marital status:      Spouse name: Not on file    Number of children: 1    Years of education: Not on file    Highest education level: Not on file   Occupational History    Occupation: retired     Comment: [phone] [email or web address]   Tobacco Use    Smoking status: Former     Packs/day: 1.00     Years: 25.00     Pack years: 25.00     Types: Cigarettes     Quit date:      Years since quittin.9 expansion, no wheeze, rales, or rhonchi  Abdomen:   Bowel sounds present, soft, nontender, no masses, no organomegaly, no peritoneal signs  Extremities:  No clubbing, cyanosis, or edema, left foot big toe nail avulsion  Skin:  Warm and dry, no open lesions or rash  Neuro:  Cranial nerves 2-12 intact, no focal deficits      DATA:     Recent Results (from the past 24 hour(s))   CBC with Auto Differential    Collection Time: 12/19/22  7:58 PM   Result Value Ref Range    WBC 12.2 (H) 4.5 - 11.5 E9/L    RBC 3.90 3.80 - 5.80 E12/L    Hemoglobin 11.7 (L) 12.5 - 16.5 g/dL    Hematocrit 33.7 (L) 37.0 - 54.0 %    MCV 86.4 80.0 - 99.9 fL    MCH 30.0 26.0 - 35.0 pg    MCHC 34.7 (H) 32.0 - 34.5 %    RDW 13.9 11.5 - 15.0 fL    Platelets 683 573 - 973 E9/L    MPV 9.2 7.0 - 12.0 fL    Neutrophils % 93.1 (H) 43.0 - 80.0 %    Lymphocytes % 1.7 (L) 20.0 - 42.0 %    Monocytes % 5.2 2.0 - 12.0 %    Eosinophils % 0.0 0.0 - 6.0 %    Basophils % 0.1 0.0 - 2.0 %    Neutrophils Absolute 11.35 (H) 1.80 - 7.30 E9/L    Lymphocytes Absolute 0.24 (L) 1.50 - 4.00 E9/L    Monocytes Absolute 0.61 0.10 - 0.95 E9/L    Eosinophils Absolute 0.00 (L) 0.05 - 0.50 E9/L    Basophils Absolute 0.00 0.00 - 0.20 E9/L    Poikilocytes 1+     Ovalocytes 1+    Comprehensive Metabolic Panel    Collection Time: 12/19/22  7:58 PM   Result Value Ref Range    Sodium 139 132 - 146 mmol/L    Potassium 2.7 (LL) 3.5 - 5.0 mmol/L    Chloride 95 (L) 98 - 107 mmol/L    CO2 21 (L) 22 - 29 mmol/L    Anion Gap 23 (H) 7 - 16 mmol/L    Glucose 136 (H) 74 - 99 mg/dL    BUN 16 6 - 23 mg/dL    Creatinine 1.6 (H) 0.7 - 1.2 mg/dL    Est, Glom Filt Rate 45 >=60 mL/min/1.73    Calcium 10.0 8.6 - 10.2 mg/dL    Total Protein 6.8 6.4 - 8.3 g/dL    Albumin 3.7 3.5 - 5.2 g/dL    Total Bilirubin 1.1 0.0 - 1.2 mg/dL    Alkaline Phosphatase 103 40 - 129 U/L    ALT 11 0 - 40 U/L    AST 20 0 - 39 U/L   Lactic Acid    Collection Time: 12/19/22  7:58 PM   Result Value Ref Range    Lactic Acid 8.2 (HH) 0.5 - 2.2 mmol/L   Troponin    Collection Time: 12/19/22  7:58 PM   Result Value Ref Range    Troponin, High Sensitivity 74 (H) 0 - 11 ng/L   Magnesium    Collection Time: 12/19/22  7:58 PM   Result Value Ref Range    Magnesium 1.7 1.6 - 2.6 mg/dL   EKG 12 Lead    Collection Time: 12/19/22  8:11 PM   Result Value Ref Range    Ventricular Rate 80 BPM    Atrial Rate 80 BPM    P-R Interval 132 ms    QRS Duration 98 ms    Q-T Interval 484 ms    QTc Calculation (Bazett) 558 ms    R Axis 69 degrees    T Axis 29 degrees   Urinalysis    Collection Time: 12/20/22 12:10 AM   Result Value Ref Range    Color, UA Yellow Straw/Yellow    Clarity, UA Clear Clear    Glucose, Ur 100 (A) Negative mg/dL    Bilirubin Urine Negative Negative    Ketones, Urine TRACE (A) Negative mg/dL    Specific Gravity, UA 1.010 1.005 - 1.030    Blood, Urine MODERATE (A) Negative    pH, UA 7.0 5.0 - 9.0    Protein, UA TRACE Negative mg/dL    Urobilinogen, Urine 1.0 <2.0 E.U./dL    Nitrite, Urine Negative Negative    Leukocyte Esterase, Urine Negative Negative   Lactic Acid    Collection Time: 12/20/22 12:10 AM   Result Value Ref Range    Lactic Acid 2.4 (H) 0.5 - 2.2 mmol/L   Microscopic Urinalysis    Collection Time: 12/20/22 12:10 AM   Result Value Ref Range    WBC, UA NONE 0 - 5 /HPF    RBC, UA 5-10 (A) 0 - 2 /HPF    Bacteria, UA NONE SEEN None Seen /HPF   APTT    Collection Time: 12/20/22 12:30 AM   Result Value Ref Range    aPTT 25.8 24.5 - 35.1 sec   Protime-INR    Collection Time: 12/20/22 12:30 AM   Result Value Ref Range    Protime 13.3 (H) 9.3 - 12.4 sec    INR 1.2    TYPE AND SCREEN    Collection Time: 12/20/22 12:30 AM   Result Value Ref Range    ABO/Rh O POS     Antibody Screen NEG    TEG lab test    Collection Time: 12/20/22  1:11 AM   Result Value Ref Range    R (Reaction Time) 2.4 (L) 5.0 - 10.0 min    K (Clotting Time) 2.1 1.0 - 3.0 min    Angle (Clot Strength) 66.5 59.0 - 74.0 degree    MA (Max Amplitude) 56.8 50.0 - 70.0 mm    G-TEG 6.6 4.5 - 11.0 K d/sc    EPL-TEG 0.0 0.0 - 15.0 %    LY30 (Fibrinolysis) 0.0 0.0 - 8.0 %   Basic Metabolic Panel    Collection Time: 12/20/22  4:57 AM   Result Value Ref Range    Sodium 140 132 - 146 mmol/L    Potassium 2.5 (LL) 3.5 - 5.0 mmol/L    Chloride 105 98 - 107 mmol/L    CO2 22 22 - 29 mmol/L    Anion Gap 13 7 - 16 mmol/L    Glucose 101 (H) 74 - 99 mg/dL    BUN 14 6 - 23 mg/dL    Creatinine 1.2 0.7 - 1.2 mg/dL    Est, Glom Filt Rate >60 >=60 mL/min/1.73    Calcium 8.2 (L) 8.6 - 10.2 mg/dL   Lactic Acid    Collection Time: 12/20/22  4:57 AM   Result Value Ref Range    Lactic Acid 1.6 0.5 - 2.2 mmol/L   Potassium    Collection Time: 12/20/22  6:27 AM   Result Value Ref Range    Potassium 3.0 (L) 3.5 - 5.0 mmol/L       CT HEAD WO CONTRAST   Final Result   1. No acute intracranial abnormality. 2. Unchanged small subarachnoid hemorrhage layering over the lateral right   temporal lobe. An adjacent 5 mm right lateral temporal parenchymal hemorrhage   component may be present and seems similar to the prior examination with   improved resolution likely related to technique factors. RECOMMENDATIONS:   Careful clinical correlation and follow up recommended. CT ABDOMEN PELVIS WO CONTRAST Additional Contrast? None   Final Result   No acute process identified in the abdomen and pelvis. Extensive diverticulosis without evidence of acute diverticulitis. Stable bilateral perinephric stranding, which is nonspecific. No evidence of   hydronephrosis or urolithiasis. CT HEAD WO CONTRAST   Final Result   Acute trace subarachnoid hemorrhage in posterior right temporal lobe, as   detailed above. Stable chronic parenchymal change including atrophy and old white matter   ischemia. Advise follow-up CT head. CT CERVICAL SPINE WO CONTRAST   Final Result   No acute abnormality of the cervical spine. Stable straightened alignment with minimal C4, C5 retrolisthesis.       Stable multilevel bony and discogenic degenerative changes, as detailed above. XR CHEST PORTABLE   Final Result   No acute process. ASSESSMENT :      Principal Problem:    Fall (on) (from) other stairs and steps, initial encounter  Resolved Problems:    * No resolved hospital problems. *  Small subarachnoid hemorrhage  Lactic acidosis resolved  Hypokalemia  Acute kidney injury      Plan :    IV hydration  Replace electrolytes  Trauma and neurosurgery seen  Likely conservative therapy  Podiatry regarding left foot big toe nail avulsion      Electronically signed by Almita Victoria MD on 12/20/2022 at 9:24 AM    NOTE: This report was transcribed using voice recognition software.  Every effort was made to ensure accuracy; however, inadvertent transcription errors may be present

## 2022-12-20 NOTE — PROGRESS NOTES
Spoke to pt wife, updated on room number and phone number. Went over home medications with pt wife. Pt wife stated pt has been getting more confused, especially within the last two days.  Bed alarm on pt and pt educated on using the call light

## 2022-12-20 NOTE — ED NOTES
Pt found attempting to climb out of bed. Pt repositioned and situated back in better positioning. Pt found with the nail of the left Hallux on the bed laying beside his foot. Dry blood on the surround metatarsals.    Foot cleaned and  dressing applied      Renata Hilliard RN  12/20/22 7034

## 2022-12-20 NOTE — CONSULTS
TRAUMA HISTORY & PHYSICAL  Surgical Resident/Advance Practice Nurse  12/20/2022  1:12 AM    PRIMARY SURVEY    CHIEF COMPLAINT:  Trauma consult. Patient is a 78-year-old male that presented to the ED after a fall down approximately 3 stairs. Patient states he was walking on stairs to meet his wife when he slipped and fell hitting his frontal aspect of his forehead on the concrete stairs with associated abrasion. Patient denies any loss of consciousness. Patient is currently on ASA 81 but denies any other anticoagulant/antiplatelet use. GCS 15 on arrival.  Head CT demonstrated acute trace subarachnoid hemorrhage at the posterior right temporal lobe. CT C-spine and abdomen/pelvis were unremarkable for acute abnormality. Trauma surgery service was consulted secondary to subarachnoid hemorrhage. AIRWAY:   Airway Normal  EMS ETT Absent  Noisy respirations Absent  Retractions: Absent  Vomiting/bleeding: Absent      BREATHING:    Midaxillary breath sound left:  Normal  Midaxillary breath sound right:  Normal    Cough sound intensity:  fair   FiO2:  Room air    SMI unreliable mL.       CIRCULATION:   Femerol pulse intensity: Strong  Palpebral conjunctiva: Red    Vitals:    12/20/22 0030   BP: (!) 147/86   Pulse: 59   Resp: 27   Temp:    SpO2: 97%       Vitals:    12/19/22 2030 12/19/22 2200 12/19/22 2230 12/20/22 0030   BP: 118/68 124/83 (!) 146/76 (!) 147/86   Pulse: 70 61 61 59   Resp: 29 22 25 27   Temp:       SpO2: 97% 97% 99% 97%        FAST EXAM: Deferred    Central Nervous System    GCS Initial 15 minutes   Eye  Motor  Verbal 4 - Opens eyes on own  6 - Follows simple motor commands  5 - Alert and oriented 4 - Opens eyes on own  6 - Follows simple motor commands  5 - Alert and oriented     Neuromuscular blockade: No  Pupil size:  Left 3 mm    Right 3 mm  Pupil reaction: Yes    Wiggles fingers: Left Yes Right Yes  Wiggles toes: Left Yes   Right Yes    Hand grasp:   Left  Present      Right  Present  Plantar flexion: Left  Present      Right   Present    Loss of consciousness:  No    History Obtained From:  Patient & EMS  Private Medical Doctor: Maxwell Villegas MD      Pre-exisiting Medical History:  yes    Conditions: GERD, hyperlipidemia, hypertension    Medications: ASA 81, lisinopril, Zoloft, Carafate    Allergies: No known allergies    Social History:   Tobacco use:  none  Alcohol use:  none  Illicit drug use:  no history of illicit drug use    Past Surgical History: Denies past surgical history of an EGD/colonoscopy    Anticoagulant use: None  Antiplatelet use:   ASA    NSAID use in last 72 hours: no  Taken PCN in past:  yes  Last food/drink: Unknown  Last tetanus: Unknown    Family History:   No family history of anesthesia complications    Complaints:   Head: Moderate frontal headache  Neck:   None  Chest:   None  Back:   None  Abdomen:   None  Extremities:   None  Comments:     Review of systems:  All negative unless otherwise noted. SECONDARY SURVEY  Head/scalp: Atraumatic    Face: Superficial abrasions over forehead which are not actively bleeding    Eyes/ears/nose: Atraumatic    Pharynx/mouth: Atraumatic    Neck: Atraumatic     Cervical spine tenderness:   Cervical collar not indicated  Pain:  none  ROM: Intact    Chest wall:  Atraumatic    Heart:  Regular rate & rhythm    Abdomen: Atraumatic. Soft ND  Tenderness:  none    Pelvis: Atraumatic  Tenderness: none    Thoracolumbar spine: Atraumatic  Tenderness:  none    Genitourinary:  Atraumatic. No blood or urine noted    Rectum: Atraumatic. No blood noted. Perineum: Atraumatic. No blood or urine noted.       Extremities:   Sensory normal  Motor normal    Distal Pulses  Left arm normal  Right arm normal  Left leg normal  Right leg normal    Capillary refill  Left arm normal  Right arm normal  Left leg normal  Right leg normal    Procedures in ED:   None    In the event of Emergency Blood Transfusion:  Due to the critical condition of this patient, I request the immediate release of blood products for emergency transfusion secondary to shock. I understand the increased risks incurred by the lack of complete transfusion testing. Radiology: Chest Xray , CT Head , CT Cervical spine , and CT Abdomen     Consultations:  Neurosurgery    Admission/Diagnosis: Subarachnoid hemorrhage s/p fall downstairs    Plan of Treatment:  Patient currently admitted to medicine  Start Keppra 500 twice daily for 7 days  Consult neurosurgery with her conditions currently pending  Follow-up for repeat head CT  Order TEG and reverse as necessary  Tertiary exam   Hold anticoagulation. Okay for DVT prophylaxis after 48 hours from stable head CT.     Plan discussed with Dr. Ignacia Altamirano    at 12/20/2022 on 1:12 AM    Electronically signed by Brooklynn Yates DO on 12/20/2022 at 1:12 AM

## 2022-12-20 NOTE — PROGRESS NOTES
Trauma surgery service to sign off at this time, please recall for further questions or concerns.      Rosa Isela Sanchez MD PGY 2

## 2022-12-20 NOTE — PROGRESS NOTES
Trauma Tertiary Survey    Admit Date: 12/19/2022  Hospital day 1    CC:  S/p fall down stairs with bilateral SAH/SDH/IPH    Alcohol pre-screening:  Men: How many times in the past year have you had 5 or more drinks in a day?  none  How much do you drink on a daily basis? None    Drug Pre-screening:    How many times in the past year have you used a recreational drug or used a prescription medication for non medical reasons? No    Mood Prescreening:    During the past two weeks, have you been bothered by little interest or pleasure doing things? No  During the past two weeks, have you been bothered by feeling down, depressed or hopeless? No      Scheduled Meds:   sucralfate  1 g Oral BID    [Held by provider] aspirin  81 mg Oral Daily    sertraline  25 mg Oral Daily    levETIRAcetam  500 mg IntraVENous Q12H    Or    levETIRAcetam  500 mg Oral Q12H     Continuous Infusions:   0.9% NaCl with KCl 20 mEq 100 mL/hr at 12/20/22 1007     PRN Meds:    Subjective:     Patient resting over this AM.  Patient denies any vision changes, headache, nausea, vomiting, numbness/tingling, or weakness. Pain well controlled. Neurosurgery consult with recommendations currently pending. Objective:   Patient Vitals for the past 8 hrs:   BP Pulse Resp SpO2   12/20/22 1126 (!) 156/98 55 16 99 %   12/20/22 0900 -- 57 -- --   12/20/22 0645 (!) 167/91 54 25 94 %   12/20/22 0500 (!) 155/96 54 26 95 %       No intake/output data recorded. No intake/output data recorded. Past Medical History:   Diagnosis Date    Diverticulitis     GERD (gastroesophageal reflux disease)     High cholesterol     Hypertension     Rosacea        @[employer]@    Radiology:  CT HEAD WO CONTRAST   Final Result   1. No acute intracranial abnormality. 2. Unchanged small subarachnoid hemorrhage layering over the lateral right   temporal lobe.  An adjacent 5 mm right lateral temporal parenchymal hemorrhage   component may be present and seems similar to the prior examination with   improved resolution likely related to technique factors. RECOMMENDATIONS:   Careful clinical correlation and follow up recommended. CT ABDOMEN PELVIS WO CONTRAST Additional Contrast? None   Final Result   No acute process identified in the abdomen and pelvis. Extensive diverticulosis without evidence of acute diverticulitis. Stable bilateral perinephric stranding, which is nonspecific. No evidence of   hydronephrosis or urolithiasis. CT HEAD WO CONTRAST   Final Result   Acute trace subarachnoid hemorrhage in posterior right temporal lobe, as   detailed above. Stable chronic parenchymal change including atrophy and old white matter   ischemia. Advise follow-up CT head. CT CERVICAL SPINE WO CONTRAST   Final Result   No acute abnormality of the cervical spine. Stable straightened alignment with minimal C4, C5 retrolisthesis. Stable multilevel bony and discogenic degenerative changes, as detailed above. XR CHEST PORTABLE   Final Result   No acute process. PHYSICAL EXAM:     Central Nervous System  Loss of consciousness:  No    GCS:    Eye:  4 - Opens eyes on own  Motor:  6 - Follows simple motor commands  Verbal:  5 - Alert and oriented    Neuromuscular blockade: No  Pupil size:  Left 4 mm    Right 4 mm  Pupil reaction: Yes    Wiggles fingers: Left Yes Right Yes  Wiggles toes: Left Yes   Right Yes    Hand grasp:   Left  Present      Right  Present  Plantar flexion: Left  Present      Right   Present    PHYSICAL EXAM  General: No apparent distress, comfortable   HEENT: Trachea midline, no masses, Pupils equal round. Forehead abrasion without active signs of bleeding. Chest: Respiratory effort was normal with no retractions or use of accessory muscles. Cardiovascular: Extremities warm, well perfused,   Abdomen:  Soft and non distended.   No tenderness, guarding, rebound, or rigidity   Extremities: Moves all 4 extremeties, No pedal edema . + avulsion of right great toe nail plate without active bleeding    Spine:   Spine Tenderness ROM   Cervical 0/10 Normal   Thoracic 0 /10 Normal   Lumbar 0 /10 Normal     Musculoskeletal:    Joint Tenderness Swelling ROM   Right shoulder Absent absent normal   Left shoulder absent absent normal   Right elbow absent absent normal   Left elbow absent absent normal   Right wrist absent absent normal   Left wrist absent absent normal   Right hand grasp Absent absent normal   Left hand grasp absent absent normal   Right hip absent absent normal   Left hip absent absent normal   Right knee Absent absent normal   Left knee absent absent normal   Right ankle absent absent normal   Left ankle absent absent normal   Right foot Absent absent normal   Left foot absent absent normal       CONSULTS: Neurosurgery    PROCEDURES: None    INJURIES:      Principal Problem:    Fall (on) (from) other stairs and steps, initial encounter  Active Problems:    Traumatic subarachnoid hemorrhage with unknown loss of consciousness status    Facial contusion    Nail avulsion of toe  Resolved Problems:    * No resolved hospital problems. *        Assessment/Plan:       Neuro:  GCS 15, right temporal subarachnoid hemorrhage-neurosurgery consult with recommendations currently pending. Continue Keppra for 7 days 500 twice daily for seizure prophylaxis. Continue to hold anticoagulation. CV: HR near normal limits, no acute issues   Pulm: tolerating room air    GI: tolerating general diet   Renal: no acute issues   ID: afebrile, no acute issues     Endocrine: no acute issues,   MSK: podiatry consulted for nail plate avulsion   Heme: no acute issues      Bowel regime: As needed GlycoLax  Pain control/Sedation: As needed pain control  DVT prophylaxis: SCDs  GI: Pending neurosurgery eval  Glucose protocol:   Mouth/Eye care: per patient.    Boucher: none     Code status:    Full Code    Patient/Family update:  As available Disposition: Admitted to medicine. Surgery to be available as needed. Pending neurosurgery recommendations. Electronically signed by Pilar Garcia DO on 12/20/22 at 7:15 AM EST      Attending Physician Statement:  I have examined the patient, reviewed the record, and discussed the case with the surgical team.  I agree with the assessment and plan with the following additions, corrections, and changes.     Electronically signed by Bruno Burnham MD on 12/20/22 at 12:35 PM EST

## 2022-12-20 NOTE — CONSULTS
NEUROSURGERY CONSULTATION     Mayra Harmon   Chief Complaint   Patient presents with    Fall     Fall down steps witnessed by wife. Patient states he got dizzy before fall and has been getting dizzy x 2 weeks. +hit head with bruising noted denies LOC . Patient BP 78/54 during triage   . Chief Complaint: fall and hit head    HPI:   Mayra Harmon is a 74 y.o.  male who has history of a fall yesterday striking his head. He is unsure whether he lost consciousness. He admits to a mild headache that is achy in character, without alleviating or aggravating factors. No radiation of symptoms.     Past Medical History:   Diagnosis Date    Diverticulitis     GERD (gastroesophageal reflux disease)     High cholesterol     Hypertension     Rosacea      Past Surgical History:   Procedure Laterality Date    COLONOSCOPY  2012    polyps removed    COLONOSCOPY  2014    ENDOSCOPY, COLON, DIAGNOSTIC  2016    OTHER SURGICAL HISTORY  3/10/14    upper GI    UPPER GASTROINTESTINAL ENDOSCOPY N/A 6/10/2019    EGD BIOPSY performed by Parker Orourke MD at [facility] ENDOSCOPY      Family History   Problem Relation Age of Onset    Other Mother         bowel obstruction    No Known Problems Father     No Known Problems Sister     Atrial Fibrillation Brother       Social History     Socioeconomic History    Marital status:      Spouse name: Not on file    Number of children: 1    Years of education: Not on file    Highest education level: Not on file   Occupational History    Occupation: retired     Comment: [id number] [employer]   Tobacco Use    Smoking status: Former     Packs/day: 1.00     Years: 25.00     Pack years: 25.00     Types: Cigarettes     Quit date:      Years since quittin.9    Smokeless tobacco: Never   Vaping Use    Vaping Use: Never used   Substance and Sexual Activity    Alcohol use: Not Currently     Comment: approx 2 beers per day    Drug use: No    Sexual activity: Not on file   Other Topics Concern    Not on file   Social History Narrative    Not on file     Social Determinants of Health     Financial Resource Strain: Low Risk     Difficulty of Paying Living Expenses: Not hard at all   Food Insecurity: No Food Insecurity    Worried About Running Out of Food in the Last Year: Never true    Ran Out of Food in the Last Year: Never true   Transportation Needs: Not on file   Physical Activity: Inactive    Days of Exercise per Week: 0 days    Minutes of Exercise per Session: 0 min   Stress: Not on file   Social Connections: Not on file   Intimate Partner Violence: Not on file   Housing Stability: Not on file       Medications:   Current Facility-Administered Medications   Medication Dose Route Frequency Provider Last Rate Last Admin    sucralfate (CARAFATE) tablet 1 g  1 g Oral BID Demario Sharma MD        [facility] [place] by provider] aspirin chewable tablet 81 mg  81 mg Oral Daily Demario Sharma MD        sertraline (ZOLOFT) tablet 25 mg  25 mg Oral Daily Demario Sharma MD        0.9 % sodium chloride infusion   IntraVENous Continuous Demario Sharma  mL/hr at 12/20/22 0505 New Bag at 12/20/22 0505    levETIRAcetam (KEPPRA) 500 mg/100 mL IVPB  500 mg IntraVENous Q12H Cara Lutz DO        Or    levETIRAcetam (KEPPRA) tablet 500 mg  500 mg Oral Q12H Cara Lutz DO         Current Outpatient Medications   Medication Sig Dispense Refill    potassium chloride (KLOR-CON M) 20 MEQ extended release tablet Take 2 tablets by mouth once for 1 dose 2 tablet 0    sertraline (ZOLOFT) 25 MG tablet Take 1 tablet by mouth daily 30 tablet 5    aspirin (ASPIRIN LOW DOSE) 81 MG chewable tablet Take 1 tablet by mouth daily 90 tablet 1    lisinopril (PRINIVIL;ZESTRIL) 20 MG tablet Take 1 tablet by mouth daily 90 tablet 1    sucralfate (CARAFATE) 1 GM tablet Take 1 tablet by mouth 2 times daily 180 tablet 1        Allergies:    Patient has no known allergies.        Review of Systems Constitutional: Negative. HENT: Negative. Eyes: Negative. Respiratory: Negative. Cardiovascular: Negative. Gastrointestinal: Negative. Endocrine: Negative. Genitourinary: Negative. Skin: Negative. Allergic/Immunologic: Negative. Neurological:  Positive for headaches. Hematological: Negative. Psychiatric/Behavioral: Negative. Physical Exam     BP (!) 167/91   Pulse 54   Temp 98 °F (36.7 °C)   Resp 25   SpO2 94%    Physical Exam   Constitutional: Appears well-nourished. Head: Normocephalic. Abrasion on forehead  Eyes: EOM are normal. Pupils are equal, round, and reactive to light. Neck: Normal range of motion. No tracheal deviation present. Cardiovascular: Normal rate. Pulmonary/Chest: No stridor. Abdominal: No distension. Neurological:   Oriented to person and year, not month or place  CN 3-12 intact  Motor strength full  Sensation intact to light touch   Skin: Skin is warm and dry. Psychiatric: Thought content normal.      Assessment:   74year old male who fell yesterday with right SAH on head CT. Stable exam.  F/u head CT stable    Plan:  Serial neuro exams  No AP or AC medications  Keppra  Follow up in clinic with head CT in 4 weeks      Electronically signed by VINNIE Vuong on 12/20/2022 at 7:08 AM     I have interviewed and examined the patient and agree with above. He has SAH. I spent over 30 mins on medical decision making and in discussing his condition with him. No surgical intervention.   PT/OT and F/U in 4 weeks    Umair Carter MD

## 2022-12-20 NOTE — ED PROVIDER NOTES
Department of Emergency Medicine   ED  Provider Note  Admit Date/RoomTime: 12/19/2022  6:59 PM  ED Room: [address] [place] [address]          History of Present Illness:  12/20/22, Time: 12:17 AM EST  Chief Complaint   Patient presents with    Fall     Fall down steps witnessed by wife. Patient states he got dizzy before fall and has been getting dizzy x 2 weeks. +hit head with bruising noted denies LOC . Patient BP 78/54 during triage                Jeffery Treviño is a 74 y.o. male presenting to the ED for fall. Patient mechanical fall. States he became dizzy, fell down about 4 stairs. Did hit his head, there is no loss cough, is on no anticoagulation. He states he been dizzy for the past couple of months. Has been worked up for. No clear source at this time. He does have a vague headache at this time, denies any nausea, vomiting, chest pain or short breath, cough, sputum, paresthesias, lethargy, change in bowel or bladder, or any other symptoms or complaints. Review of Systems:   Pertinent positives and negatives are stated within HPI, all other systems reviewed and are negative.        --------------------------------------------- PAST HISTORY ---------------------------------------------  Past Medical History:  has a past medical history of Diverticulitis, GERD (gastroesophageal reflux disease), High cholesterol, Hypertension, and Rosacea. Past Surgical History:  has a past surgical history that includes other surgical history (3/10/14); Colonoscopy (7/2/2012); Colonoscopy (08/04/2014); Endoscopy, colon, diagnostic (11/07/2016); and Upper gastrointestinal endoscopy (N/A, 6/10/2019). Social History:  reports that he quit smoking about 38 years ago. His smoking use included cigarettes. He has a 25.00 pack-year smoking history. He has never used smokeless tobacco. He reports that he does not currently use alcohol. He reports that he does not use drugs.     Family History: family history includes Atrial Fibrillation in his brother; No Known Problems in his father and sister; Other in his mother. . Unless otherwise noted, family history is non contributory    The patients home medications have been reviewed. Allergies: Patient has no known allergies. ---------------------------------------------------PHYSICAL EXAM--------------------------------------    Constitutional/General: Alert and oriented x3  Head: Normocephalic with an abrasion over his occipital area along with an abrasion over his forehead  Eyes: PERRL, EOMI, sclera non icteric  Mouth: Oropharynx clear, handling secretions, no trismus, no asymmetry of the posterior oropharynx or uvular edema  Neck: Supple, full ROM, no stridor, no meningeal signs  Respiratory: Lungs clear to auscultation bilaterally, no wheezes, rales, or rhonchi. Not in respiratory distress  Cardiovascular:  Regular rate. Regular rhythm. 2+ distal pulses. Equal extremity pulses. Chest: No chest wall tenderness  GI:  Abdomen Soft, Non tender, Non distended. No rebound, guarding, or rigidity. No pulsatile masses. Musculoskeletal: Moves all extremities x 4. Warm and well perfused, no clubbing, cyanosis, or edema. Capillary refill <3 seconds  Integument: skin warm and dry. No rashes. Neurologic: GCS 15, no focal deficits, symmetric strength 5/5 in the upper and lower extremities bilaterally  Psychiatric: Normal Affect          -------------------------------------------------- RESULTS -------------------------------------------------  I have personally reviewed all laboratory and imaging results for this patient. Results are listed below.      LABS: (Lab results interpreted by me)  Results for orders placed or performed during the hospital encounter of 12/19/22   CBC with Auto Differential   Result Value Ref Range    WBC 12.2 (H) 4.5 - 11.5 E9/L    RBC 3.90 3.80 - 5.80 E12/L    Hemoglobin 11.7 (L) 12.5 - 16.5 g/dL    Hematocrit 33.7 (L) 37.0 - 54.0 %    MCV 86.4 80.0 - 99.9 fL MCH 30.0 26.0 - 35.0 pg    MCHC 34.7 (H) 32.0 - 34.5 %    RDW 13.9 11.5 - 15.0 fL    Platelets 523 222 - 369 E9/L    MPV 9.2 7.0 - 12.0 fL    Neutrophils % 93.1 (H) 43.0 - 80.0 %    Lymphocytes % 1.7 (L) 20.0 - 42.0 %    Monocytes % 5.2 2.0 - 12.0 %    Eosinophils % 0.0 0.0 - 6.0 %    Basophils % 0.1 0.0 - 2.0 %    Neutrophils Absolute 11.35 (H) 1.80 - 7.30 E9/L    Lymphocytes Absolute 0.24 (L) 1.50 - 4.00 E9/L    Monocytes Absolute 0.61 0.10 - 0.95 E9/L    Eosinophils Absolute 0.00 (L) 0.05 - 0.50 E9/L    Basophils Absolute 0.00 0.00 - 0.20 E9/L    Poikilocytes 1+     Ovalocytes 1+    Comprehensive Metabolic Panel   Result Value Ref Range    Sodium 139 132 - 146 mmol/L    Potassium 2.7 (LL) 3.5 - 5.0 mmol/L    Chloride 95 (L) 98 - 107 mmol/L    CO2 21 (L) 22 - 29 mmol/L    Anion Gap 23 (H) 7 - 16 mmol/L    Glucose 136 (H) 74 - 99 mg/dL    BUN 16 6 - 23 mg/dL    Creatinine 1.6 (H) 0.7 - 1.2 mg/dL    Est, Glom Filt Rate 45 >=60 mL/min/1.73    Calcium 10.0 8.6 - 10.2 mg/dL    Total Protein 6.8 6.4 - 8.3 g/dL    Albumin 3.7 3.5 - 5.2 g/dL    Total Bilirubin 1.1 0.0 - 1.2 mg/dL    Alkaline Phosphatase 103 40 - 129 U/L    ALT 11 0 - 40 U/L    AST 20 0 - 39 U/L   Lactic Acid   Result Value Ref Range    Lactic Acid 8.2 (HH) 0.5 - 2.2 mmol/L   Troponin   Result Value Ref Range    Troponin, High Sensitivity 74 (H) 0 - 11 ng/L   Magnesium   Result Value Ref Range    Magnesium 1.7 1.6 - 2.6 mg/dL   EKG 12 Lead   Result Value Ref Range    Ventricular Rate 80 BPM    Atrial Rate 80 BPM    P-R Interval 132 ms    QRS Duration 98 ms    Q-T Interval 484 ms    QTc Calculation (Bazett) 558 ms    R Axis 69 degrees    T Axis 29 degrees   ,       RADIOLOGY:  Interpreted by Radiologist unless otherwise specified  CT ABDOMEN PELVIS WO CONTRAST Additional Contrast? None   Final Result   No acute process identified in the abdomen and pelvis. Extensive diverticulosis without evidence of acute diverticulitis.       Stable bilateral perinephric stranding, which is nonspecific. No evidence of   hydronephrosis or urolithiasis. CT HEAD WO CONTRAST   Final Result   Acute trace subarachnoid hemorrhage in posterior right temporal lobe, as   detailed above. Stable chronic parenchymal change including atrophy and old white matter   ischemia. Advise follow-up CT head. CT CERVICAL SPINE WO CONTRAST   Final Result   No acute abnormality of the cervical spine. Stable straightened alignment with minimal C4, C5 retrolisthesis. Stable multilevel bony and discogenic degenerative changes, as detailed above. XR CHEST PORTABLE   Final Result   No acute process. EKG Interpretation  Interpreted by emergency department physician, Dr. Reed Feeling     Sinus rhythm, rate 80, no STEMI, no ischemic change        ------------------------- NURSING NOTES AND VITALS REVIEWED ---------------------------   The nursing notes within the ED encounter and vital signs as below have been reviewed by myself  BP (!) 146/76   Pulse 61   Temp 98 °F (36.7 °C)   Resp 25   SpO2 99%     Oxygen Saturation Interpretation: Normal    The patients available past medical records and past encounters were reviewed. ------------------------------ ED COURSE/MEDICAL DECISION MAKING----------------------  Medications   0.9 % sodium chloride infusion ( IntraVENous Stopped 12/20/22 0011)   0.9 % sodium chloride bolus (0 mLs IntraVENous Stopped 12/19/22 2201)   potassium chloride 10 mEq/100 mL IVPB (Peripheral Line) (0 mEq IntraVENous Stopped 12/19/22 2326)           The cardiac monitor revealed sinus with a heart rate in the 80s as interpreted by me. The cardiac monitor was ordered secondary to the patient's trauma and to monitor the patient for dysrhythmia. [facility] [id number]         Medical Decision Making:    Patient hypotensive, responded to IV fluids. Labs reviewed. Significant for lactic acid of 8, along with acute kidney injury of 2.   CT reviewed, there is a trace subarachnoid hemorrhage. Checks x-ray reviewed by myself, no obvious pathology. Discussed with trauma along with the hospitalist intensivist, patient will be admitted. Please note that the withdrawal or failure to initiate urgent interventions for this patient would likely result in a life threatening deterioration or permanent disability. Accordingly this patient received 30 minutes of critical care time, excluding separately billable procedures. Counseling: The emergency provider has spoken with the patient and discussed todays results, in addition to providing specific details for the plan of care and counseling regarding the diagnosis and prognosis. Questions are answered at this time and they are agreeable with the plan.       --------------------------------- IMPRESSION AND DISPOSITION ---------------------------------    IMPRESSION  1. Fall, initial encounter    2. SAH (subarachnoid hemorrhage) ([facility] [address]    3. GAYE (acute kidney injury) ([facility]        DISPOSITION  Disposition: Admit to telemetry  Patient condition is stable        NOTE: This report was transcribed using voice recognition software.  Every effort was made to ensure accuracy; however, inadvertent computerized transcription errors may be present        Robbin Nixon MD  12/20/22 8436

## 2022-12-21 LAB
ANION GAP SERPL CALCULATED.3IONS-SCNC: 14 MMOL/L (ref 7–16)
BUN BLDV-MCNC: 8 MG/DL (ref 6–23)
CALCIUM SERPL-MCNC: 8.9 MG/DL (ref 8.6–10.2)
CHLORIDE BLD-SCNC: 100 MMOL/L (ref 98–107)
CO2: 23 MMOL/L (ref 22–29)
CREAT SERPL-MCNC: 0.9 MG/DL (ref 0.7–1.2)
EKG ATRIAL RATE: 80 BPM
EKG P-R INTERVAL: 132 MS
EKG Q-T INTERVAL: 484 MS
EKG QRS DURATION: 98 MS
EKG QTC CALCULATION (BAZETT): 558 MS
EKG R AXIS: 69 DEGREES
EKG T AXIS: 29 DEGREES
EKG VENTRICULAR RATE: 80 BPM
GFR SERPL CREATININE-BSD FRML MDRD: >60 ML/MIN/1.73
GLUCOSE BLD-MCNC: 96 MG/DL (ref 74–99)
POTASSIUM SERPL-SCNC: 2.9 MMOL/L (ref 3.5–5)
SODIUM BLD-SCNC: 137 MMOL/L (ref 132–146)

## 2022-12-21 PROCEDURE — 93010 ELECTROCARDIOGRAM REPORT: CPT | Performed by: INTERNAL MEDICINE

## 2022-12-21 PROCEDURE — 6360000002 HC RX W HCPCS: Performed by: INTERNAL MEDICINE

## 2022-12-21 PROCEDURE — 80048 BASIC METABOLIC PNL TOTAL CA: CPT

## 2022-12-21 PROCEDURE — 6370000000 HC RX 637 (ALT 250 FOR IP): Performed by: INTERNAL MEDICINE

## 2022-12-21 PROCEDURE — 97535 SELF CARE MNGMENT TRAINING: CPT

## 2022-12-21 PROCEDURE — 97165 OT EVAL LOW COMPLEX 30 MIN: CPT

## 2022-12-21 PROCEDURE — 36415 COLL VENOUS BLD VENIPUNCTURE: CPT

## 2022-12-21 PROCEDURE — 2060000000 HC ICU INTERMEDIATE R&B

## 2022-12-21 PROCEDURE — 6370000000 HC RX 637 (ALT 250 FOR IP)

## 2022-12-21 RX ORDER — POTASSIUM CHLORIDE 20 MEQ/1
40 TABLET, EXTENDED RELEASE ORAL ONCE
Status: COMPLETED | OUTPATIENT
Start: 2022-12-21 | End: 2022-12-21

## 2022-12-21 RX ADMIN — MUPIROCIN: 20 OINTMENT TOPICAL at 09:04

## 2022-12-21 RX ADMIN — POTASSIUM CHLORIDE 40 MEQ: 1500 TABLET, EXTENDED RELEASE ORAL at 18:26

## 2022-12-21 RX ADMIN — SUCRALFATE 1 G: 1 TABLET ORAL at 09:04

## 2022-12-21 RX ADMIN — POTASSIUM CHLORIDE AND SODIUM CHLORIDE: 900; 150 INJECTION, SOLUTION INTRAVENOUS at 22:09

## 2022-12-21 RX ADMIN — LEVETIRACETAM 500 MG: 500 TABLET, FILM COATED ORAL at 00:34

## 2022-12-21 RX ADMIN — SERTRALINE 25 MG: 50 TABLET, FILM COATED ORAL at 09:04

## 2022-12-21 RX ADMIN — LEVETIRACETAM 500 MG: 500 TABLET, FILM COATED ORAL at 13:24

## 2022-12-21 RX ADMIN — POTASSIUM CHLORIDE AND SODIUM CHLORIDE: 900; 150 INJECTION, SOLUTION INTRAVENOUS at 11:32

## 2022-12-21 ASSESSMENT — PAIN SCALES - GENERAL
PAINLEVEL_OUTOF10: 0
PAINLEVEL_OUTOF10: 0

## 2022-12-21 NOTE — PROGRESS NOTES
Subjective:    Chief complaint:  Seen earlier this morning  Denies new issues    Objective:    BP (!) 163/88   Pulse 66   Temp 97.5 °F (36.4 °C) (Temporal)   Resp 16   Ht 5' 8\" (1.727 m)   Wt 135 lb (61.2 kg)   SpO2 99%   BMI 20.53 kg/m²   General : Awake ,alert,no distress. Heart:  RRR, no murmurs, gallops, or rubs. Lungs:  CTA bilaterally, no wheeze, rales or rhonchi  Abd: bowel sounds present, nontender, nondistended, no masses  Extrem:  No clubbing, cyanosis, or edema    CBC:   Lab Results   Component Value Date/Time    WBC 12.2 12/19/2022 07:58 PM    RBC 3.90 12/19/2022 07:58 PM    HGB 11.7 12/19/2022 07:58 PM    HCT 33.7 12/19/2022 07:58 PM    MCV 86.4 12/19/2022 07:58 PM    MCH 30.0 12/19/2022 07:58 PM    MCHC 34.7 12/19/2022 07:58 PM    RDW 13.9 12/19/2022 07:58 PM     12/19/2022 07:58 PM    MPV 9.2 12/19/2022 07:58 PM     BMP:    Lab Results   Component Value Date/Time     12/20/2022 04:57 AM    K 3.0 12/20/2022 06:27 AM    K 3.7 10/24/2021 08:30 PM     12/20/2022 04:57 AM    CO2 22 12/20/2022 04:57 AM    BUN 14 12/20/2022 04:57 AM    LABALBU 3.7 12/19/2022 07:58 PM    LABALBU 4.2 06/28/2011 12:00 PM    CREATININE 1.2 12/20/2022 04:57 AM    CALCIUM 8.2 12/20/2022 04:57 AM    GFRAA >60 08/03/2022 09:39 AM    LABGLOM >60 12/20/2022 04:57 AM    GLUCOSE 101 12/20/2022 04:57 AM    GLUCOSE 90 06/28/2011 12:00 PM     PT/INR:    Lab Results   Component Value Date/Time    PROTIME 13.3 12/20/2022 12:30 AM    INR 1.2 12/20/2022 12:30 AM     Troponin:  No results found for: TROPONINI    No results for input(s): LABURIN in the last 72 hours. No results for input(s): BC in the last 72 hours. No results for input(s): Akilah Rice in the last 72 hours.       Current Facility-Administered Medications:     sucralfate (CARAFATE) tablet 1 g, 1 g, Oral, BID, Josie Powers MD, 1 g at 12/21/22 1586    [Held by provider] aspirin chewable tablet 81 mg, 81 mg, Oral, Daily, Viktor Mccarthy, MD    sertraline (ZOLOFT) tablet 25 mg, 25 mg, Oral, Daily, Wiliam Adams MD, 25 mg at 12/21/22 0904    levETIRAcetam (KEPPRA) 500 mg/100 mL IVPB, 500 mg, IntraVENous, Q12H **OR** levETIRAcetam (KEPPRA) tablet 500 mg, 500 mg, Oral, Q12H, Cara Lutz DO, 500 mg at 12/21/22 1324    0.9% NaCl with KCl 20 mEq infusion, , IntraVENous, Continuous, Wiliam Adams MD, Last Rate: 100 mL/hr at 12/21/22 1132, New Bag at 12/21/22 1132    mupirocin (BACTROBAN) 2 % ointment, , Topical, Daily, Reinier Sylvester DPM, Given at 12/21/22 6420    ADULT DIET; Regular    XR FOOT RIGHT (MIN 3 VIEWS)   Final Result   1st proximal phalanx fracture         CT HEAD WO CONTRAST   Final Result   1. No acute intracranial abnormality. 2. Unchanged small subarachnoid hemorrhage layering over the lateral right   temporal lobe. An adjacent 5 mm right lateral temporal parenchymal hemorrhage   component may be present and seems similar to the prior examination with   improved resolution likely related to technique factors. RECOMMENDATIONS:   Careful clinical correlation and follow up recommended. CT ABDOMEN PELVIS WO CONTRAST Additional Contrast? None   Final Result   No acute process identified in the abdomen and pelvis. Extensive diverticulosis without evidence of acute diverticulitis. Stable bilateral perinephric stranding, which is nonspecific. No evidence of   hydronephrosis or urolithiasis. CT HEAD WO CONTRAST   Final Result   Acute trace subarachnoid hemorrhage in posterior right temporal lobe, as   detailed above. Stable chronic parenchymal change including atrophy and old white matter   ischemia. Advise follow-up CT head. CT CERVICAL SPINE WO CONTRAST   Final Result   No acute abnormality of the cervical spine. Stable straightened alignment with minimal C4, C5 retrolisthesis. Stable multilevel bony and discogenic degenerative changes, as detailed above. XR CHEST PORTABLE   Final Result   No acute process. Assessment:    Principal Problem:    Fall  Active Problems:    Traumatic subarachnoid hemorrhage with unknown loss of consciousness status    Facial contusion    Nail avulsion of toe    SAH (subarachnoid hemorrhage) (HCC)  Resolved Problems:    * No resolved hospital problems. *      Plan:    No neurosurgical intervention  He will need subacute rehab on discharge as he had a significant fall with injury  Patient's wife cannot help him at home      Ane Boast, MD  2:38 PM  12/21/2022    NOTE: This report was transcribed using voice recognition software.  Every effort was made to ensure accuracy; however, inadvertent transcription errors may be present

## 2022-12-21 NOTE — PROGRESS NOTES
[address] [place] [address]          [facility]                                                   Patient Name: Juani Andres     MRN: [id number]     : 1950     Room: [address] [place] [address]       Evaluating OT: Neel Munson OTD, OTR/L, [id number]      Referring Provider: Kojo Baker MD    Specific Provider Orders/Date: OT eval and treat (22)    Diagnosis: SAH (subarachnoid hemorrhage) ([facility] [address] [I60.9]  GAYE (acute kidney injury) ([facility] [address] [N17.9]  Fall, initial encounter [W19. XXXA]  Fall (on) (from) other stairs and steps, initial encounter [W10.8XXA]    Surgeries/Procedures: None this admission       Pt admitted s/p fall at home down 4 stairs with resulting SAH. Pertinent Medical History:      has a past medical history of Diverticulitis, GERD (gastroesophageal reflux disease), High cholesterol, Hypertension, and Rosacea.          Precautions:  Fall Risk     Assessment of current deficits    [x] Functional mobility  [x]ADLs  [x] Strength               [x]Cognition    [x] Functional transfers   [x] IADLs         [x] Safety Awareness   [x]Endurance    [x] Fine Coordination              [x] Balance      [] Vision/perception   [x]Sensation     []Gross Motor Coordination  [] ROM  [] Delirium                   [] Motor Control     OT PLAN OF CARE   OT POC based on physician orders, patient diagnosis and results of clinical assessment    Frequency/Duration 1-3 days/wk for 2 weeks PRN   Specific OT Treatment Interventions to include:   * Instruction/training on adapted ADL techniques and AE recommendations to increase functional independence within precautions       * Training on energy conservation strategies, correct breathing pattern and techniques to improve independence/tolerance for self-care routine  * Functional transfer/mobility training/DME recommendations for increased independence, safety, and fall prevention  * Patient/Family education to increase follow through with safety techniques and functional independence  * Recommendation of environmental modifications for increased safety with functional transfers/mobility and ADLs  * Cognitive retraining/development of therapeutic activities to improve problem solving, judgement, memory, and attention for increased safety/participation in ADL/IADL tasks  * Sensory re-education to improve body/limb awareness, maintain/improve skin integrity, and improve hand/UE motor function  * Visual-perceptual training to improve environmental scanning, visual attention/focus, and oculomotor skills for increased safety/independence with functional transfers/mobility and ADLs  * Splinting/positioning for increased function, prevention of contractures, and improve skin integrity  * Therapeutic exercise to improve motor endurance, ROM, and functional strength for ADLs/functional transfers  * Therapeutic activities to facilitate/challenge dynamic balance, stand tolerance for increased safety and independence with ADLs  * Therapeutic activities to facilitate gross/fine motor skills for increased independence with ADLs  * Neuro-muscular re-education: facilitation of righting/equilibrium reactions, midline orientation, scapular stability/mobility, normalization of muscle tone, and facilitation of volitional active controled movement  * Positioning to improve skin integrity, interaction with environment and functional independence  * Delirium prevention/treatment  * Manual techniques for edema management    Modified Banner Scale (MRS)  Score     Description  0             No symptoms  1             No significant disability despite symptoms  2             Slight disability; able to look after own affairs  3             Moderate disability; able to ambulate without assist/ requires assist with ADLs  4             Moderate/Severe disability;requires assist to ambulate/assist with ADLs  5             Severe disability;bedridden/incontinent   6               Score:   4      Recommended Adaptive Equipment/DME: TBD     Home Living: Pt lives with wife in [address] [facility] with 5 APRIL and bed and bath on 2nd level. Bathroom setup: tub/shower with grab bar   Equipment owned: PATRICE Salazar    Prior Level of Function: ind with ADLs , Ind with IADLs; Used quad cane for functional mobility. Driving: Yes  Occupation: None stated    Pain Level: 0/10  Cognition: A&O: 3/4; Follows 1 step directions   Memory: F+   Sequencing: F   Problem solving: F   Judgement/safety: F    Vitals Assessed: NT  SpO2:      BP:      Functional Assessment:  AM-PAC Daily Activity Raw Score:    Initial Eval Status  Date: 22 Treatment Status  Date: STGs = LTGs  Time frame: 10-14 days   Feeding NT     Independent    Grooming Stand by Assist   Seated EOB  Stand by Assist  Standing at sink      UB Dressing Moderate Assist     Stand by Assist    LB Dressing Maximal Assist overall  Min A  To don/doff socks seated at EOB with figure 4 technique    Stand by Assist   Bathing Moderate Assist    Minimal Assist    Toileting Maximal Assist   Pt placed on bedpan. Dep for posterior hygiene. Stand by Assist   Bed Mobility  Supine to sit: Moderate Assist   Sit to supine: Moderate Assist     Supine to sit: Supervision   Sit to supine: Supervision    Functional Transfers Sit to stand: Moderate Assist  Stand to sit: Moderate Assist  Stand pivot: NT     Stand by Assist    Functional Mobility Moderate Assist with FWW  For side steps at EOB. Stand by Assist    Balance Sitting:     Static: S    Dynamic:SBA  Standing: Mod A    during functional activity  Sitting:     Static: Ind    Dynamic:S  Standing: SBA   Activity Tolerance Fair with light activity. WFL  For full ADL   Visual/  Perceptual Glasses: Yes  Pt reports chronic blurry vision in R eye.         Safety F  G-     Hand Dominance R   AROM (PROM) Strength Additional Info:    RUE  WFL Grossly 4/5 Good  and wfl FMC/dexterity noted during ADL tasks       LUE WFL Grossly 4/5 Good  and wfl FMC/dexterity noted during ADL tasks       Hearing: WFL  Sensation:  No c/o numbness or tingling  Tone: WFL   Edema: Unremarkable    Comments: Patient cleared by nursing. Upon arrival patient supine in bed, educated on the role of OT, and agreeable to OT session. No family present for session today. Pt demos flat affect throughout session requiring verbal cues for attention to task and posture at EOB. OT facilitated ADLs, bed mobility, functional transfers with focus on safety, body mechanics, and precautions. At end of session, patient semi supine in bed, properly positioned, oriented to call light, with call light to R side and phone within reach, all lines and tubes intact. Nursing notified. Overall patient demonstrated fair reception to education, decreased independence and safety during completion of ADL/functional transfer/mobility tasks. Pt would benefit from continued skilled OT to increase safety and independence with completion of ADL/IADL tasks for functional independence and quality of life. Treatment: OT treatment provided this date includes:   Instruction/training on safety and adapted techniques for completion of ADLs: to increase independence in self-care. Instruction/training on safe functional mobility/transfer techniques: with focus on safety, body mechanics, and precautions   Proper Positioning/Alignment for optimal healing, skin integrity, to prevent breakdown, decrease edema, and reduce risk of contracture. Skilled Monitoring of Vitals: to include BP, spO2, and HR throughout session to maximize safety. Sitting/Standing Balance/Tolerance:  to increase balance and activity tolerance during ADLs and facilitate proper posture and positioning.   Therapeutic activity: to challenge dynamic sitting/standing balance and endurance to promote safety during ADL tasks and functional transfers and mobility. Rehab Potential: Good for established goals     Patient / Family Goal: to [facility]      Patient and/or family were instructed on functional diagnosis, prognosis/goals and OT plan of care. Demonstrated good understanding. Eval Complexity: Low    Time In: 1104  Time Out: 1133  Total Treatment Time: 14 min    Min Units   OT Eval Low 70862 X      OT Eval Medium 77123      OT Eval High 52379      OT Re-Eval [id number]       Therapeutic Ex 68755       Therapeutic Activities 59231       ADL/Self Care 39460 14  1    Orthotic Management 19207       Manual 93437     Neuro Re-Ed 90906       Non-Billable Time          Evaluation Time additionally includes thorough review of current medical information, gathering information on past medical history/social history and prior level of function, interpretation of standardized testing/informal observation of tasks, assessment of data and development of plan of care and goals.             Rodo Seymour, OTD,  OTR/L; [id number]

## 2022-12-21 NOTE — PROGRESS NOTES
Department of Neurosurgery  Progress Note    CHIEF COMPLAINT: pt seen for traumatic SAH    SUBJECTIVE:  mild headache    REVIEW OF SYSTEMS :  Constitutional: Negative for chills and fever. Neurological: Negative for dizziness, tremors and speech change.      OBJECTIVE:   VITALS:  BP (!) 150/82   Pulse 61   Temp 98.7 °F (37.1 °C) (Temporal)   Resp 16   Ht 5' 8\" (1.727 m)   Wt 135 lb (61.2 kg)   SpO2 96%   BMI 20.53 kg/m²   PHYSICAL:  CONSTITUTIONAL:  awake, alert, cooperative, no apparent distress, and appears stated age    DATA:  CBC:   Lab Results   Component Value Date/Time    WBC 12.2 12/19/2022 07:58 PM    RBC 3.90 12/19/2022 07:58 PM    HGB 11.7 12/19/2022 07:58 PM    HCT 33.7 12/19/2022 07:58 PM    MCV 86.4 12/19/2022 07:58 PM    MCH 30.0 12/19/2022 07:58 PM    MCHC 34.7 12/19/2022 07:58 PM    RDW 13.9 12/19/2022 07:58 PM     12/19/2022 07:58 PM    MPV 9.2 12/19/2022 07:58 PM     BMP:    Lab Results   Component Value Date/Time     12/20/2022 04:57 AM    K 3.0 12/20/2022 06:27 AM    K 3.7 10/24/2021 08:30 PM     12/20/2022 04:57 AM    CO2 22 12/20/2022 04:57 AM    BUN 14 12/20/2022 04:57 AM    LABALBU 3.7 12/19/2022 07:58 PM    LABALBU 4.2 06/28/2011 12:00 PM    CREATININE 1.2 12/20/2022 04:57 AM    CALCIUM 8.2 12/20/2022 04:57 AM    GFRAA >60 08/03/2022 09:39 AM    LABGLOM >60 12/20/2022 04:57 AM    GLUCOSE 101 12/20/2022 04:57 AM    GLUCOSE 90 06/28/2011 12:00 PM     PT/INR:    Lab Results   Component Value Date/Time    PROTIME 13.3 12/20/2022 12:30 AM    INR 1.2 12/20/2022 12:30 AM     PTT:    Lab Results   Component Value Date/Time    APTT 25.8 12/20/2022 12:30 AM   [APTT}    Current Inpatient Medications  Current Facility-Administered Medications: sucralfate (CARAFATE) tablet 1 g, 1 g, Oral, BID  [Held by provider] aspirin chewable tablet 81 mg, 81 mg, Oral, Daily  sertraline (ZOLOFT) tablet 25 mg, 25 mg, Oral, Daily  levETIRAcetam (KEPPRA) 500 mg/100 mL IVPB, 500 mg, IntraVENous, Q12H **OR** levETIRAcetam (KEPPRA) tablet 500 mg, 500 mg, Oral, Q12H  0.9% NaCl with KCl 20 mEq infusion, , IntraVENous, Continuous  mupirocin (BACTROBAN) 2 % ointment, , Topical, Daily    ASSESSMENT:   Traumatic SAH - stable exam    PLAN:  No AC or AP medications  Serial neuro exams  Follow up in office in 4 weeks with head CT      Electronically signed by VINNIE Junior on 12/21/2022 at 7:55 AM

## 2022-12-21 NOTE — CARE COORDINATION
Social Work Discharge Planning:  SW met with patient at bedside and spoke with spouse Marcy Mathur. Patient lives with spouse in a two story home with 5 step to gain access to entry. Patient uses a FWW and is active with [facility], was suppose to start today, will need STACIE prior to discharge. PCP Dr. Yuliet Munguia and pharmacy is [facility] on [address].  SW will continue to follow and assist.  Electronically signed by Sybil Severance, LSW on 12/20/2022 at 8:21 PM

## 2022-12-22 ENCOUNTER — APPOINTMENT (OUTPATIENT)
Dept: GENERAL RADIOLOGY | Age: 72
End: 2022-12-22
Payer: MEDICARE

## 2022-12-22 LAB
ANION GAP SERPL CALCULATED.3IONS-SCNC: 14 MMOL/L (ref 7–16)
BUN BLDV-MCNC: 8 MG/DL (ref 6–23)
CALCIUM SERPL-MCNC: 8.4 MG/DL (ref 8.6–10.2)
CHLORIDE BLD-SCNC: 102 MMOL/L (ref 98–107)
CO2: 22 MMOL/L (ref 22–29)
CREAT SERPL-MCNC: 0.8 MG/DL (ref 0.7–1.2)
GFR SERPL CREATININE-BSD FRML MDRD: >60 ML/MIN/1.73
GLUCOSE BLD-MCNC: 81 MG/DL (ref 74–99)
MAGNESIUM: 1.4 MG/DL (ref 1.6–2.6)
POTASSIUM SERPL-SCNC: 2.9 MMOL/L (ref 3.5–5)
POTASSIUM SERPL-SCNC: 3.2 MMOL/L (ref 3.5–5)
SODIUM BLD-SCNC: 138 MMOL/L (ref 132–146)

## 2022-12-22 PROCEDURE — 6370000000 HC RX 637 (ALT 250 FOR IP): Performed by: INTERNAL MEDICINE

## 2022-12-22 PROCEDURE — 2060000000 HC ICU INTERMEDIATE R&B

## 2022-12-22 PROCEDURE — 6360000002 HC RX W HCPCS: Performed by: INTERNAL MEDICINE

## 2022-12-22 PROCEDURE — 97161 PT EVAL LOW COMPLEX 20 MIN: CPT

## 2022-12-22 PROCEDURE — 84132 ASSAY OF SERUM POTASSIUM: CPT

## 2022-12-22 PROCEDURE — 36415 COLL VENOUS BLD VENIPUNCTURE: CPT

## 2022-12-22 PROCEDURE — 97530 THERAPEUTIC ACTIVITIES: CPT

## 2022-12-22 PROCEDURE — 6370000000 HC RX 637 (ALT 250 FOR IP)

## 2022-12-22 PROCEDURE — 83735 ASSAY OF MAGNESIUM: CPT

## 2022-12-22 PROCEDURE — 97535 SELF CARE MNGMENT TRAINING: CPT

## 2022-12-22 PROCEDURE — 80048 BASIC METABOLIC PNL TOTAL CA: CPT

## 2022-12-22 PROCEDURE — 74018 RADEX ABDOMEN 1 VIEW: CPT

## 2022-12-22 RX ORDER — POTASSIUM CHLORIDE 20 MEQ/1
40 TABLET, EXTENDED RELEASE ORAL ONCE
Status: COMPLETED | OUTPATIENT
Start: 2022-12-22 | End: 2022-12-22

## 2022-12-22 RX ORDER — PANTOPRAZOLE SODIUM 40 MG/1
40 TABLET, DELAYED RELEASE ORAL
Status: DISCONTINUED | OUTPATIENT
Start: 2022-12-23 | End: 2022-12-23 | Stop reason: HOSPADM

## 2022-12-22 RX ADMIN — SERTRALINE 25 MG: 50 TABLET, FILM COATED ORAL at 10:41

## 2022-12-22 RX ADMIN — LEVETIRACETAM 500 MG: 500 TABLET, FILM COATED ORAL at 13:17

## 2022-12-22 RX ADMIN — SUCRALFATE 1 G: 1 TABLET ORAL at 18:18

## 2022-12-22 RX ADMIN — POTASSIUM CHLORIDE AND SODIUM CHLORIDE: 900; 150 INJECTION, SOLUTION INTRAVENOUS at 06:54

## 2022-12-22 RX ADMIN — MUPIROCIN: 20 OINTMENT TOPICAL at 10:41

## 2022-12-22 RX ADMIN — SUCRALFATE 1 G: 1 TABLET ORAL at 10:41

## 2022-12-22 RX ADMIN — LEVETIRACETAM 500 MG: 500 TABLET, FILM COATED ORAL at 00:34

## 2022-12-22 RX ADMIN — POTASSIUM CHLORIDE 40 MEQ: 1500 TABLET, EXTENDED RELEASE ORAL at 15:56

## 2022-12-22 ASSESSMENT — PAIN SCALES - GENERAL
PAINLEVEL_OUTOF10: 0
PAINLEVEL_OUTOF10: 0

## 2022-12-22 NOTE — ACP (ADVANCE CARE PLANNING)
Advance Care Planning   Healthcare Decision Maker:    Primary Decision Maker: Verito Doirving - Spouse - [phone]    Secondary Decision Maker: Warrenisaac Ijeoma III - Child - [phone]    Click here to complete Healthcare Decision Makers including selection of the Healthcare Decision Maker Relationship (ie \"Primary\").

## 2022-12-22 NOTE — DISCHARGE INSTR - COC
Continuity of Care Form    Patient Name: Treva Crawford   :  1950  MRN:  [id number]    Admit date:  2022  Discharge date:  ***    Code Status Order: Full Code   Advance Directives:     Admitting Physician:  Tom Payan MD  PCP: Tom Payan MD    Discharging Nurse: [facility] Unit/Room#: 0792/8224-V  Discharging Unit Phone Number: ***    Emergency Contact:   Extended Emergency Contact Information  Primary Emergency Contact: Stacy Weber  [address] Phone: [phone]  Relation: Spouse  Secondary Emergency Contact: Gurinder Weber III  Address: [address] of [address] Phone: [phone]  Relation: Child    Past Surgical History:  Past Surgical History:   Procedure Laterality Date    COLONOSCOPY  2012    polyps removed    COLONOSCOPY  2014    ENDOSCOPY, COLON, DIAGNOSTIC  2016    OTHER SURGICAL HISTORY  3/10/14    upper GI    UPPER GASTROINTESTINAL ENDOSCOPY N/A 6/10/2019    EGD BIOPSY performed by Driss Daniels MD at [facility] History:   Immunization History   Administered Date(s) Administered    COVID-19, MODERNA BLUE border, Primary or Immunocompromised, (age 12y+), IM, 100 mcg/0.5mL 2021, 2021, 2022    COVID-19, MODERNA Bivalent BOOSTER, (age 12y+), IM, 48 mcg/0.5 mL 2022    Influenza, FLUAD, (age 72 y+), Adjuvanted, 0.5mL 2020, 10/10/2021    Influenza, FLUARIX, FLULAVAL, FLUZONE (age 10 mo+) AND AFLURIA, (age 1 y+), PF, 0.5mL 10/11/2018, 10/11/2018, 10/02/2019    Influenza, FLUZONE (age 72 y+), High Dose, 0.7mL 10/03/2022    Influenza, High Dose (Fluzone 65 yrs and older) 10/17/2015, 10/17/2015, 10/15/2016, 10/15/2016    Influenza, Triv, inactivated, subunit, adjuvanted, IM (Fluad 65 yrs and older) 10/28/2017, 10/28/2017    Pneumococcal Conjugate 13-valent (Shanti Tate) 01/12/2016, 01/12/2016    Pneumococcal Polysaccharide ([id number]) 07/12/2017       Active Problems:  Patient Active Problem List   Diagnosis Code    BMI 26.0-26.9,adult Z68.26    Benign essential hypertension I10    GERD (gastroesophageal reflux disease) K21.9    Rosacea L71.9    Hyperlipidemia E78.5    Current moderate episode of major depressive disorder without prior episode ([facility] [address] F32.1    Insomnia G47.00    Ataxia R27.0    Fall W19. XXXA    Traumatic subarachnoid hemorrhage with unknown loss of consciousness status S06. 6XAA    Facial contusion S00.83XA    Nail avulsion of toe S91.209A    SAH (subarachnoid hemorrhage) ([facility]) I60.9       Isolation/Infection:   Isolation            No Isolation          Patient Infection Status       Infection Onset Added Last Indicated Last Indicated By Review Planned Expiration Resolved Resolved By    None active    Resolved    COVID-19 (Rule Out) 12/04/22 12/04/22 12/04/22 COVID-19, Rapid (Ordered)   12/04/22 Rule-Out Test Resulted            Nurse Assessment:  Last Vital Signs: BP (!) 158/88 Comment: manual  Pulse 64   Temp 96.9 °F (36.1 °C) (Temporal)   Resp 16   Ht 5' 8\" (1.727 m)   Wt 135 lb (61.2 kg)   SpO2 99%   BMI 20.53 kg/m²     Last documented pain score (0-10 scale): Pain Level: 0  Last Weight:   Wt Readings from Last 1 Encounters:   12/20/22 135 lb (61.2 kg)     Mental Status:  disoriented and alert    IV Access:  - None    Nursing Mobility/ADLs:  Walking   Assisted  Transfer  Assisted  Bathing  Assisted  Dressing  Assisted  Toileting  Assisted  Feeding  Independent  Med Admin  Assisted  Med Delivery   whole    Wound Care Documentation and Therapy:  Wound 12/20/22 Sacrum Mid (Active)   Wound Length (cm) 0.5 cm 12/20/22 1853   Wound Width (cm) 0.5 cm 12/20/22 1853   Wound Depth (cm) 0.01 cm 12/20/22 1853   Wound Surface Area (cm^2) 0.25 cm^2 12/20/22 1853   Wound Volume (cm^3) 0.0025 cm^3 12/20/22 1853   Wound Assessment Pink/red 12/22/22 0750   Drainage Amount None 12/22/22 0750   Number of days: 1        Elimination:  Continence: Bowel: Yes  Bladder: Yes  Urinary Catheter: None   Colostomy/Ileostomy/Ileal Conduit: No       Date of Last BM: 12/21/2022    Intake/Output Summary (Last 24 hours) at 12/22/2022 1445  Last data filed at 12/22/2022 1410  Gross per 24 hour   Intake 240 ml   Output 1700 ml   Net -1460 ml     I/O last 3 completed shifts: In: 180 [P.O.:180]  Out: 3350 [Urine:3350]    Safety Concerns:     History of Falls (last 30 days) and At Risk for Falls    Impairments/Disabilities:      Hearing    Nutrition Therapy:  Current Nutrition Therapy:   - Oral Diet:  General    Routes of Feeding: Oral  Liquids: Thin Liquids  Daily Fluid Restriction: no  Last Modified Barium Swallow with Video (Video Swallowing Test): not done    Treatments at the Time of Hospital Discharge:   Respiratory Treatments: n/a  Oxygen Therapy:  is not on home oxygen therapy.   Ventilator:    - No ventilator support    Rehab Therapies: Physical Therapy and Occupational Therapy  Weight Bearing Status/Restrictions: No weight bearing restrictions  Other Medical Equipment (for information only, NOT a DME order):  walker, bath bench, bedside commode, and hospital bed  Other Treatments: n/a    Patient's personal belongings (please select all that are sent with patient):  None    RN SIGNATURE:  Electronically signed by Anne-Marie Saleh RN on 12/23/22 at 11:50 AM EST    CASE MANAGEMENT/SOCIAL WORK SECTION    Inpatient Status Date: ***    Readmission Risk Assessment Score:  Readmission Risk              Risk of Unplanned Readmission:  10           Discharging to Facility/ Agency   Name: [facility]   Phone:[phone]  ODN:[phone]    Dialysis Facility (if applicable)   Name:  Address:  Dialysis Schedule:  Phone:  Fax:    / signature: Electronically signed by ROSALINDA Cruz on 12/22/2022 at 2:45 PM      PHYSICIAN SECTION    Prognosis: {Prognosis:9082525226}    Condition at Discharge: [address] Patient Condition:[id number]}    Rehab Potential (if transferring to Rehab): {Prognosis:2792968133}    Recommended Labs or Other Treatments After Discharge: ***    Physician Certification: I certify the above information and transfer of Danny Mendoza  is necessary for the continuing treatment of the diagnosis listed and that he requires [place] for less 30 days.      Update Admission H&P: No change in H&P    PHYSICIAN SIGNATURE:  Electronically signed by Hakeem Urrutia MD on 12/23/22 at 11:03 AM EST

## 2022-12-22 NOTE — PROGRESS NOTES
Subjective:    Chief complaint:  Complaining of some epigastric abdominal discomfort  Moving his bowels      Objective:    BP (!) 158/88 Comment: manual  Pulse 64   Temp 96.9 °F (36.1 °C) (Temporal)   Resp 16   Ht 5' 8\" (1.727 m)   Wt 135 lb (61.2 kg)   SpO2 99%   BMI 20.53 kg/m²   General : Awake ,alert,no distress. Heart:  RRR, no murmurs, gallops, or rubs. Lungs:  CTA bilaterally, no wheeze, rales or rhonchi  Abd: bowel sounds present, nontender, nondistended, no masses  Extrem:  No clubbing, cyanosis, or edema    CBC:   Lab Results   Component Value Date/Time    WBC 12.2 12/19/2022 07:58 PM    RBC 3.90 12/19/2022 07:58 PM    HGB 11.7 12/19/2022 07:58 PM    HCT 33.7 12/19/2022 07:58 PM    MCV 86.4 12/19/2022 07:58 PM    MCH 30.0 12/19/2022 07:58 PM    MCHC 34.7 12/19/2022 07:58 PM    RDW 13.9 12/19/2022 07:58 PM     12/19/2022 07:58 PM    MPV 9.2 12/19/2022 07:58 PM     BMP:    Lab Results   Component Value Date/Time     12/22/2022 05:07 AM    K 2.9 12/22/2022 05:07 AM    K 3.7 10/24/2021 08:30 PM     12/22/2022 05:07 AM    CO2 22 12/22/2022 05:07 AM    BUN 8 12/22/2022 05:07 AM    LABALBU 3.7 12/19/2022 07:58 PM    LABALBU 4.2 06/28/2011 12:00 PM    CREATININE 0.8 12/22/2022 05:07 AM    CALCIUM 8.4 12/22/2022 05:07 AM    GFRAA >60 08/03/2022 09:39 AM    LABGLOM >60 12/22/2022 05:07 AM    GLUCOSE 81 12/22/2022 05:07 AM    GLUCOSE 90 06/28/2011 12:00 PM     PT/INR:    Lab Results   Component Value Date/Time    PROTIME 13.3 12/20/2022 12:30 AM    INR 1.2 12/20/2022 12:30 AM     Troponin:  No results found for: TROPONINI    No results for input(s): LABURIN in the last 72 hours. No results for input(s): BC in the last 72 hours. No results for input(s): Amaryllis Rocio in the last 72 hours.       Current Facility-Administered Medications:     potassium chloride (KLOR-CON M) extended release tablet 40 mEq, 40 mEq, Oral, Once, Doug Gillette MD    sucralfate (CARAFATE) tablet 1 g, 1 g, Oral, BID, Praveen Anderson MD, 1 g at 12/22/22 1041    [Held by provider] aspirin chewable tablet 81 mg, 81 mg, Oral, Daily, Praveen Anderson MD    sertraline (ZOLOFT) tablet 25 mg, 25 mg, Oral, Daily, Praveen Anderson MD, 25 mg at 12/22/22 1041    levETIRAcetam (KEPPRA) 500 mg/100 mL IVPB, 500 mg, IntraVENous, Q12H **OR** levETIRAcetam (KEPPRA) tablet 500 mg, 500 mg, Oral, Q12H, Cara Lutz, , 500 mg at 12/22/22 1317    0.9% NaCl with KCl 20 mEq infusion, , IntraVENous, Continuous, Praveen Anderson MD, Last Rate: 100 mL/hr at 12/22/22 0654, New Bag at 12/22/22 0654    mupirocin (BACTROBAN) 2 % ointment, , Topical, Daily, Akilah Phillip DPM, Given at 12/22/22 1041    ADULT DIET; Regular    XR FOOT RIGHT (MIN 3 VIEWS)   Final Result   1st proximal phalanx fracture         CT HEAD WO CONTRAST   Final Result   1. No acute intracranial abnormality. 2. Unchanged small subarachnoid hemorrhage layering over the lateral right   temporal lobe. An adjacent 5 mm right lateral temporal parenchymal hemorrhage   component may be present and seems similar to the prior examination with   improved resolution likely related to technique factors. RECOMMENDATIONS:   Careful clinical correlation and follow up recommended. CT ABDOMEN PELVIS WO CONTRAST Additional Contrast? None   Final Result   No acute process identified in the abdomen and pelvis. Extensive diverticulosis without evidence of acute diverticulitis. Stable bilateral perinephric stranding, which is nonspecific. No evidence of   hydronephrosis or urolithiasis. CT HEAD WO CONTRAST   Final Result   Acute trace subarachnoid hemorrhage in posterior right temporal lobe, as   detailed above. Stable chronic parenchymal change including atrophy and old white matter   ischemia. Advise follow-up CT head. CT CERVICAL SPINE WO CONTRAST   Final Result   No acute abnormality of the cervical spine.       Stable straightened alignment with minimal C4, C5 retrolisthesis. Stable multilevel bony and discogenic degenerative changes, as detailed above. XR CHEST PORTABLE   Final Result   No acute process. Assessment:    Principal Problem:    Fall  Active Problems:    Traumatic subarachnoid hemorrhage with unknown loss of consciousness status    Facial contusion    Nail avulsion of toe    SAH (subarachnoid hemorrhage) (HCC)  Resolved Problems:    * No resolved hospital problems. *      Plan:    ALLAN on dc  Possible gastritis symptoms  protonix\  kub      Dayo Jackson MD  2:32 PM  12/22/2022    NOTE: This report was transcribed using voice recognition software.  Every effort was made to ensure accuracy; however, inadvertent transcription errors may be present

## 2022-12-22 NOTE — CARE COORDINATION
SOCIAL WORK/CASEMANAGEMENT TRANSITION OF CARE PLANNINGJasnow Olmstead, [address]):  daryl is assessing pt for [facility]. OT note is in waiting for PT eval. Precert is needed and will be started once therapy notes are in and accepted by the [facility]. All discharge paperwork in place with pasrr.  ROSALINDA Cam  12/22/2022

## 2022-12-22 NOTE — PROGRESS NOTES
Physical Therapy  Physical Therapy Initial Assessment     Name: Comfort Hernandes  : 1950  MRN: [id number]      Date of Service: 2022    Evaluating PT:  Markus Hannah PT, DPT, [id number]    Room #:  6062/6541-Y  Diagnosis:  SAH (subarachnoid hemorrhage) ([facility] [I60.9]  [facility] [N17.9]  Fall, initial encounter [W19. XXXA]  Fall (on) (from) other stairs and steps, initial encounter [W10.8XXA]  PMHx/PSHx:    Past Medical History:   Diagnosis Date    Diverticulitis     GERD (gastroesophageal reflux disease)     High cholesterol     Hypertension     Rosacea       Past Surgical History:   Procedure Laterality Date    COLONOSCOPY  2012    polyps removed    COLONOSCOPY  2014    ENDOSCOPY, COLON, DIAGNOSTIC  2016    OTHER SURGICAL HISTORY  3/10/14    upper GI    UPPER GASTROINTESTINAL ENDOSCOPY N/A 6/10/2019    EGD BIOPSY [facility] Femi Alford MD [facility]COPY      Procedure/Surgery:  none this admission  Precautions:  Fall risk, bed alarm, TAPS, Boucher  Equipment Needs:  TBD    SUBJECTIVE:    Pt lives with wife in a 2 story home with 5 stairs to enter and 1 rail. Bed is on 2 floor and bath is on 2 floor full flight to access. Pt ambulated with [employer] PTA. Pt [facility] with [facility]. OBJECTIVE:   Initial Evaluation  Date: 22 Treatment Short Term/ Long Term   Goals   AM-PAC 6 Clicks 94/75     Was pt agreeable to Eval/treatment? yes     Does pt have pain? No c/o pain     Bed Mobility  Rolling: NT  Supine to sit: Amor  Sit to supine: Amor  Scooting: Amor  Rolling: Independent . Supine to sit: Independent   Sit to supine:Independent   Scooting: Independent    Transfers Sit to stand: Amor  Stand to sit: Amor  Stand pivot: NT  Sit to stand: Independent   Stand to sit: Independent   Stand pivot: Independent    Ambulation    Few side steps at EOB feet with [employer] ModA  50+ feet with AAD Amor   Stair negotiation: ascended and descended NT  12 steps with 1 rail Amor   ROM BUE: Refer to OT  BLE:  WFL     Strength BUE:  Refer to OT  BLE:  grossly 4-/5  4/5   Balance Sitting EOB:  SBA  Dynamic Standing:  ModA with [employer]  Sitting EOB:  Independent   Dynamic Standing:  Amor with AAD     Pt is A & O x 3  Sensation:  Pt denies numbness and tingling to extremities  Edema:  unremarkable    Vitals:  Blood Pressure sitting /88   Heart Rate sitting EOB 72 bpm   SPO2 sitting EOB 99% RA       Patient education  Pt educated on role of PT and safe functional mobility. Patient response to education:   Pt verbalized understanding Pt demonstrated skill Pt requires further education in this area   x x x     ASSESSMENT:    Conditions Requiring Skilled Therapeutic Intervention:    [x]Decreased strength     [x]Decreased ROM  [x]Decreased functional mobility  [x]Decreased balance   [x]Decreased endurance   []Decreased posture  []Decreased sensation  []Decreased coordination   []Decreased vision  []Decreased safety awareness   []Increased pain       Comments:  Pt was supine in bed upon PT entry and agreeable to participate. Pt reported dizziness with all positional changes throughout session, but states that this is normal and he generally rests until it subsides. Pt was able to complete supine>sit transfer with assistance for trunk and RLE, requiring verbal cues for sequencing and safety. Pt was able to sit EOB with fair seated balance for prolonged period of time throughout evaluation. Pt completed multiple sit<>stand transfers from EOB with verbal and tactile cues for hand/foot placement. Noted forward flexed posture and slightly flexed knees B. Pt side stepped at EOB with assistance to weight shift, manage WW, and to increase steadiness. Noted decreased foot clearance on LLE. Pt was returned to bed and positioned in supine with HOB elevated, heel protectors donned, and all needs met at conclusion of session. RN notified.     Treatment:  Patient practiced and was instructed in the following treatment: Bed mobility training - pt given verbal and tactile cues to facilitate proper sequencing and safety during rolling and supine>sit as well as provided with physical assistance to complete task   Sitting EOB for >10 minutes for upright tolerance, postural awareness and BLE ROM  Transfer training - pt was given verbal and tactile cues to facilitate proper hand placement, technique and safety during sit to stand and stand to sit as well as provided with physical assistance. Gait training- pt was given verbal and tactile cues to facilitate balance, weight shift, [facility] management, and safety during ambulation as well as provided with physical assistance. Skilled positioning - Pt placed in the chair position with pillows utilized to facilitate upright posture, joint and skin integrity, and interaction with environment. Skilled monitoring of vitals throughout session. Pt's/ family goals   1. To return to PLOF    Prognosis is good for reaching above PT goals. Patient and or family understand(s) diagnosis, prognosis, and plan of care. yes    PHYSICAL THERAPY PLAN OF CARE:    PT POC is established based on physician order and patient diagnosis     Referring provider/PT Order:    12/21/22 0730  PT eval and treat  Start:  12/21/22 0730,   End:  12/21/22 0730,   ONE TIME,   Standing Count:  1 Occurrences,   Natasha Lake MD     Diagnosis:  SAH (subarachnoid hemorrhage) ([facility] [I60.9]  GAYE (acute kidney injury) ([facility] [N17.9]  Fall, initial encounter [W19. XXXA]  Fall (on) (from) other stairs and steps, initial encounter [W10.8XXA]  Specific instructions for next treatment:  improve tolerance to upright/positional changes    Current Treatment Recommendations:     [x] Strengthening to improve independence with functional mobility   [x] ROM to improve independence with functional mobility   [x] Balance Training to improve static/dynamic balance and to reduce fall risk  [x] Endurance Training to improve activity tolerance during functional mobility   [x] Transfer Training to improve safety and independence with all functional transfers   [x] Gait Training to improve gait mechanics, endurance and assess need for appropriate assistive device  [x] Stair Training in preparation for safe discharge home and/or into the community   [x] Positioning to prevent skin breakdown and contractures  [x] Safety and Education Training   [x] Patient/Caregiver Education   [] HEP  [x] Other     PT long term treatment goals are located in above grid    Frequency of treatments: 2-5x/week x 1-2 weeks. Time in  1345  Time out  1410    Total Treatment Time  10 minutes     Evaluation Time includes thorough review of current medical information, gathering information on past medical history/social history and prior level of function, completion of standardized testing/informal observation of tasks, assessment of data and education on plan of care and goals.     CPT codes:  [x] Low Complexity PT evaluation 04570  [] Moderate Complexity PT evaluation 01208  [] High Complexity PT evaluation 02971  [] PT Re-evaluation 34787  [] Gait training 55720 0 minutes  [] Manual therapy 16200 0 minutes  [x] Therapeutic activities 44353 10 minutes  [] Therapeutic exercises 16023 0 minutes  [] Neuromuscular reeducation 85334 0 minutes     Irma Baker PT, DPT  [id number]

## 2022-12-22 NOTE — PROGRESS NOTES
Occupational Therapy  OT BEDSIDE TREATMENT NOTE   [address] [address]  [address] [place] [address]       IJMM:  Patient Name: Juani Andres  MRN: [id number]  : 1950  Room: [address] [place] [address]     Per OT Eval:    Evaluating OT: Neel Munson OTD, OTR/L, [id number]       Referring Provider: Kojo Baker MD    Specific Provider Orders/Date: OT eval and treat (22)    Diagnosis: SAH (subarachnoid hemorrhage) ([facility] [I60.9]  GAYE (acute kidney injury) ([facility] [N17.9]  Fall, initial encounter [W19. XXXA]  Fall (on) (from) other stairs and steps, initial encounter [W10.8XXA]    Surgeries/Procedures: None this admission        Pt admitted s/p fall at home down 4 stairs with resulting SAH. Pertinent Medical History:      has a past medical history of Diverticulitis, GERD (gastroesophageal reflux disease), High cholesterol, Hypertension, and Rosacea.         Precautions:  Fall Risk      Assessment of current deficits    [x] Functional mobility            [x]ADLs           [x] Strength                   [x]Cognition    [x] Functional transfers          [x] IADLs          [x] Safety Awareness   [x]Endurance    [x] Fine Coordination              [x] Balance      [] Vision/perception    [x]Sensation      []Gross Motor Coordination  [] ROM           [] Delirium                   [] Motor Control      OT PLAN OF CARE   OT POC based on physician orders, patient diagnosis and results of clinical assessment     Frequency/Duration 1-3 days/wk for 2 weeks PRN   Specific OT Treatment Interventions to include:   * Instruction/training on adapted ADL techniques and AE recommendations to increase functional independence within precautions       * Training on energy conservation strategies, correct breathing pattern and techniques to improve independence/tolerance for self-care routine  * Functional transfer/mobility training/DME recommendations for increased independence, safety, and fall prevention  * Patient/Family education to increase follow through with safety techniques and functional independence  * Recommendation of environmental modifications for increased safety with functional transfers/mobility and ADLs  * Cognitive retraining/development of therapeutic activities to improve problem solving, judgement, memory, and attention for increased safety/participation in ADL/IADL tasks  * Sensory re-education to improve body/limb awareness, maintain/improve skin integrity, and improve hand/UE motor function  * Visual-perceptual training to improve environmental scanning, visual attention/focus, and oculomotor skills for increased safety/independence with functional transfers/mobility and ADLs  * Splinting/positioning for increased function, prevention of contractures, and improve skin integrity  * Therapeutic exercise to improve motor endurance, ROM, and functional strength for ADLs/functional transfers  * Therapeutic activities to facilitate/challenge dynamic balance, stand tolerance for increased safety and independence with ADLs  * Therapeutic activities to facilitate gross/fine motor skills for increased independence with ADLs  * Neuro-muscular re-education: facilitation of righting/equilibrium reactions, midline orientation, scapular stability/mobility, normalization of muscle tone, and facilitation of volitional active controled movement  * Positioning to improve skin integrity, interaction with environment and functional independence  * Delirium prevention/treatment  * Manual techniques for edema management     Modified Guy Scale (MRS)  Score     Description  0             No symptoms  1             No significant disability despite symptoms  2             Slight disability; able to look after own affairs  3             Moderate disability; able to ambulate without assist/ requires assist with ADLs  4             Moderate/Severe disability;requires assist to ambulate/assist with ADLs  5             Severe disability;bedridden/incontinent   6               Score:   4     Recommended Adaptive Equipment/DME: TBD      Home Living: Pt lives with wife in [address] with 5 APRIL and bed and bath on 2nd level. Bathroom setup: tub/shower with grab bar   Equipment owned: PATRICE Tang     Prior Level of Function: ind with ADLs , Ind with IADLs; Used quad cane for functional mobility. Driving: Yes  Occupation: None stated     Pain Level: 0/10    Cognition: A&O: 3/4; Follows 1 step directions, repeated cues due to [address], limited by dizziness today               Memory: F+              Sequencing: F              Problem solving: Fair              Judgement/safety: Fair                Functional Assessment:  AM-PAC Daily Activity Raw Score:     Initial Eval Status  Date: 22 Treatment Status  Date:  22 STGs = LTGs  Time frame: 10-14 days   Feeding NT     Supervision  Independent    Grooming Stand by Assist   Seated EOB SBA   Seated, simulated task, declined  Stand by Assist  Standing at sink      UB Dressing Moderate Assist      Mod A  Donning 2nd gown as a robe seated at EOB Stand by Assist    LB Dressing Maximal Assist overall  Min A  To don/doff socks seated at EOB with figure 4 technique     Max A  Limited by dizziness, limited by andersen  Stand by Assist   Bathing Moderate Assist     Mod A  Simulated task  Minimal Assist    Toileting Maximal Assist   Pt placed on bedpan. Dep for posterior hygiene. Max A  Andersen present, recommending BSC Stand by Assist   Bed Mobility  Supine to sit: Moderate Assist   Sit to supine: Moderate Assist     Min A  Supine < > sit  Supine to sit: Supervision   Sit to supine: Supervision    Functional Transfers Sit to stand: Moderate Assist  Stand to sit: Moderate Assist  Stand pivot: NT     Min A  Sit < > stand   Cues for hand placement  Stand by Assist    Functional Mobility Moderate Assist with FWW  For side steps at EOB.     Min A  With walker, side stepping only   Stand by Assist    Balance Sitting:     Static: S    Dynamic:SBA  Standing: Mod A    during functional activity  Sitting: SBA  Standing: Min A   With walker  Sitting:     Static: Ind    Dynamic:S  Standing: SBA   Activity Tolerance Fair with light activity. Fair-  Light tasks, limited by dizziness   /88  HR 72  O2 99% on RA WFL  For full ADL   Visual/  Perceptual Glasses: Yes  Pt reports chronic blurry vision in R eye. Safety F  Fair G-      Education:  Pt was educated through out treatment regarding proper technique & safety with bed mobility, functional transfers & limited mobility, walker management & ADL compensatory strategies to ease tasks, improve safety & prevent falls to return home safely. Comments: Upon arrival pt was in bed & agreeable for therapy. At end of session pt was returned to bed due to dizziness, HOB semi-upright, nsg informed of pt's symptoms, all lines and tubes intact, call light within reach. Pt has made slow progress towards set goals. Continue with current plan of care    Treatment Time In: 1:45            Treatment Time Out: 2:10           Treatment Charges: Mins Units   Ther Ex  80353     Manual Therapy [phone]     Thera Activities 40227 15 1   ADL/Home Mgt 96003 10 1   Neuro Re-ed 91179     Group Therapy      Orthotic manage/training  74523     Non-Billable Time     Total Timed Treatment 25 2       Jena ASHLEY  [address] [place] [address], [address] [place] [address]

## 2022-12-23 VITALS
HEART RATE: 68 BPM | BODY MASS INDEX: 20.46 KG/M2 | OXYGEN SATURATION: 96 % | WEIGHT: 135 LBS | HEIGHT: 68 IN | TEMPERATURE: 97.5 F | SYSTOLIC BLOOD PRESSURE: 160 MMHG | DIASTOLIC BLOOD PRESSURE: 88 MMHG | RESPIRATION RATE: 16 BRPM

## 2022-12-23 LAB — SARS-COV-2, NAAT: NOT DETECTED

## 2022-12-23 PROCEDURE — 6360000002 HC RX W HCPCS

## 2022-12-23 PROCEDURE — 6370000000 HC RX 637 (ALT 250 FOR IP): Performed by: INTERNAL MEDICINE

## 2022-12-23 PROCEDURE — 6360000002 HC RX W HCPCS: Performed by: INTERNAL MEDICINE

## 2022-12-23 PROCEDURE — 6370000000 HC RX 637 (ALT 250 FOR IP)

## 2022-12-23 PROCEDURE — 87635 SARS-COV-2 COVID-19 AMP PRB: CPT

## 2022-12-23 RX ORDER — POTASSIUM CHLORIDE 20 MEQ/1
40 TABLET, EXTENDED RELEASE ORAL
Status: COMPLETED | OUTPATIENT
Start: 2022-12-23 | End: 2022-12-23

## 2022-12-23 RX ORDER — POTASSIUM CHLORIDE 20 MEQ/1
40 TABLET, EXTENDED RELEASE ORAL DAILY
Qty: 2 TABLET | Refills: 0 | Status: SHIPPED | OUTPATIENT
Start: 2022-12-23

## 2022-12-23 RX ORDER — SUCRALFATE 1 G/1
1 TABLET ORAL 2 TIMES DAILY
Qty: 180 TABLET | Refills: 1 | Status: SHIPPED | OUTPATIENT
Start: 2022-12-23

## 2022-12-23 RX ORDER — MAGNESIUM OXIDE 400 MG/1
400 TABLET ORAL DAILY
Qty: 30 TABLET | Refills: 1 | Status: SHIPPED | OUTPATIENT
Start: 2022-12-23

## 2022-12-23 RX ORDER — ASPIRIN 81 MG/1
81 TABLET, CHEWABLE ORAL DAILY
Qty: 90 TABLET | Refills: 1 | COMMUNITY
Start: 2022-12-23 | End: 2023-03-23

## 2022-12-23 RX ORDER — LEVETIRACETAM 500 MG/1
500 TABLET ORAL EVERY 12 HOURS
Qty: 60 TABLET | Refills: 3 | Status: SHIPPED | OUTPATIENT
Start: 2022-12-23 | End: 2022-12-27

## 2022-12-23 RX ORDER — MAGNESIUM SULFATE 1 G/100ML
1000 INJECTION INTRAVENOUS ONCE
Status: COMPLETED | OUTPATIENT
Start: 2022-12-23 | End: 2022-12-23

## 2022-12-23 RX ORDER — PANTOPRAZOLE SODIUM 40 MG/1
40 TABLET, DELAYED RELEASE ORAL
Qty: 30 TABLET | Refills: 3 | Status: SHIPPED | OUTPATIENT
Start: 2022-12-24

## 2022-12-23 RX ADMIN — PANTOPRAZOLE SODIUM 40 MG: 40 TABLET, DELAYED RELEASE ORAL at 05:05

## 2022-12-23 RX ADMIN — MUPIROCIN: 20 OINTMENT TOPICAL at 09:24

## 2022-12-23 RX ADMIN — SUCRALFATE 1 G: 1 TABLET ORAL at 09:23

## 2022-12-23 RX ADMIN — MAGNESIUM SULFATE IN DEXTROSE 1000 MG: 10 INJECTION, SOLUTION INTRAVENOUS at 11:21

## 2022-12-23 RX ADMIN — LEVETIRACETAM 500 MG: 5 INJECTION INTRAVENOUS at 12:51

## 2022-12-23 RX ADMIN — SERTRALINE 25 MG: 50 TABLET, FILM COATED ORAL at 09:23

## 2022-12-23 RX ADMIN — POTASSIUM CHLORIDE 40 MEQ: 1500 TABLET, EXTENDED RELEASE ORAL at 11:20

## 2022-12-23 RX ADMIN — POTASSIUM CHLORIDE 40 MEQ: 1500 TABLET, EXTENDED RELEASE ORAL at 15:03

## 2022-12-23 RX ADMIN — LEVETIRACETAM 500 MG: 500 TABLET, FILM COATED ORAL at 01:32

## 2022-12-23 ASSESSMENT — PAIN SCALES - GENERAL: PAINLEVEL_OUTOF10: 0

## 2022-12-23 NOTE — PLAN OF CARE
Problem: Discharge Planning  Goal: Discharge to home or other facility with appropriate resources  Outcome: Completed     Problem: Skin/Tissue Integrity  Goal: Absence of new skin breakdown  Description: 1. Monitor for areas of redness and/or skin breakdown  2. Assess vascular access sites hourly  3. Every 4-6 hours minimum:  Change oxygen saturation probe site  4. Every 4-6 hours:  If on nasal continuous positive airway pressure, respiratory therapy assess nares and determine need for appliance change or resting period.   12/23/2022 1142 by Gaby Guerra RN  Outcome: Completed  12/22/2022 2201 by Ezio Paulson RN  Outcome: Progressing     Problem: ABCDS Injury Assessment  Goal: Absence of physical injury  12/23/2022 1142 by Gaby Guerra RN  Outcome: Completed  12/22/2022 2201 by Ezio Paulson RN  Outcome: Progressing     Problem: Safety - Adult  Goal: Free from fall injury  12/23/2022 1142 by Gaby Guerra RN  Outcome: Completed  12/22/2022 2201 by Ezio Paulson RN  Outcome: Progressing     Problem: Pain  Goal: Verbalizes/displays adequate comfort level or baseline comfort level  Outcome: Completed

## 2022-12-23 NOTE — CARE COORDINATION
Presents to the emergency room after falling down the stairs. SAH and AMS. SW received call from [facility] @ [employer]  has insurance auth and can discharge there today - auth good thru 12/27. Message to attending with above. Ambulance, ambulette and passr in envelope. Electronically signed by Zaire Villagomez RN on 12/23/2022 at 10:32 AM    Ambulance transport set up for 5 pm via [employer]. Charge RN bedside RN, wife and  facility liaison ntfd. Needs covid Electronically signed by Zaire Villagomez RN on 12/23/2022 at 11:19 AM

## 2022-12-23 NOTE — PROGRESS NOTES
Subjective:    Chief complaint:  Doing better  No new issues    Objective:    BP (!) 160/88   Pulse 68   Temp 97.5 °F (36.4 °C) (Temporal)   Resp 16   Ht 5' 8\" (1.727 m)   Wt 135 lb (61.2 kg)   SpO2 96%   BMI 20.53 kg/m²   General : Awake ,alert,no distress. Heart:  RRR, no murmurs, gallops, or rubs. Lungs:  CTA bilaterally, no wheeze, rales or rhonchi  Abd: bowel sounds present, nontender, nondistended, no masses  Extrem:  No clubbing, cyanosis, or edema    CBC:   Lab Results   Component Value Date/Time    WBC 12.2 12/19/2022 07:58 PM    RBC 3.90 12/19/2022 07:58 PM    HGB 11.7 12/19/2022 07:58 PM    HCT 33.7 12/19/2022 07:58 PM    MCV 86.4 12/19/2022 07:58 PM    MCH 30.0 12/19/2022 07:58 PM    MCHC 34.7 12/19/2022 07:58 PM    RDW 13.9 12/19/2022 07:58 PM     12/19/2022 07:58 PM    MPV 9.2 12/19/2022 07:58 PM     BMP:    Lab Results   Component Value Date/Time     12/22/2022 05:07 AM    K 3.2 12/22/2022 03:13 PM    K 3.7 10/24/2021 08:30 PM     12/22/2022 05:07 AM    CO2 22 12/22/2022 05:07 AM    BUN 8 12/22/2022 05:07 AM    LABALBU 3.7 12/19/2022 07:58 PM    LABALBU 4.2 06/28/2011 12:00 PM    CREATININE 0.8 12/22/2022 05:07 AM    CALCIUM 8.4 12/22/2022 05:07 AM    GFRAA >60 08/03/2022 09:39 AM    LABGLOM >60 12/22/2022 05:07 AM    GLUCOSE 81 12/22/2022 05:07 AM    GLUCOSE 90 06/28/2011 12:00 PM     PT/INR:    Lab Results   Component Value Date/Time    PROTIME 13.3 12/20/2022 12:30 AM    INR 1.2 12/20/2022 12:30 AM     Troponin:  No results found for: TROPONINI    No results for input(s): LABURIN in the last 72 hours. No results for input(s): BC in the last 72 hours. No results for input(s): Ruslan Lo in the last 72 hours.       Current Facility-Administered Medications:     pantoprazole (PROTONIX) tablet 40 mg, 40 mg, Oral, QAM AC, Gwyn Cash MD, 40 mg at 12/23/22 3228    sucralfate (CARAFATE) tablet 1 g, 1 g, Oral, BID, Gwyn Cash MD, 1 g at 12/23/22 1226 [Held by provider] aspirin chewable tablet 81 mg, 81 mg, Oral, Daily, Carolyn Meza MD    sertraline (ZOLOFT) tablet 25 mg, 25 mg, Oral, Daily, Carolyn Meza MD, 25 mg at 12/23/22 0923    levETIRAcetam (KEPPRA) 500 mg/100 mL IVPB, 500 mg, IntraVENous, Q12H **OR** levETIRAcetam (KEPPRA) tablet 500 mg, 500 mg, Oral, Q12H, Cara Lutz, , 500 mg at 12/23/22 0132    0.9% NaCl with KCl 20 mEq infusion, , IntraVENous, Continuous, Carolyn Meza MD, Last Rate: 100 mL/hr at 12/22/22 0654, New Bag at 12/22/22 0654    mupirocin (BACTROBAN) 2 % ointment, , Topical, Daily, Pino Delgado DPM, Given at 12/23/22 1829    ADULT DIET; Regular    XR ABDOMEN (KUB) (SINGLE AP VIEW)   Final Result   Diffuse colonic fecal retention. XR FOOT RIGHT (MIN 3 VIEWS)   Final Result   1st proximal phalanx fracture         CT HEAD WO CONTRAST   Final Result   1. No acute intracranial abnormality. 2. Unchanged small subarachnoid hemorrhage layering over the lateral right   temporal lobe. An adjacent 5 mm right lateral temporal parenchymal hemorrhage   component may be present and seems similar to the prior examination with   improved resolution likely related to technique factors. RECOMMENDATIONS:   Careful clinical correlation and follow up recommended. CT ABDOMEN PELVIS WO CONTRAST Additional Contrast? None   Final Result   No acute process identified in the abdomen and pelvis. Extensive diverticulosis without evidence of acute diverticulitis. Stable bilateral perinephric stranding, which is nonspecific. No evidence of   hydronephrosis or urolithiasis. CT HEAD WO CONTRAST   Final Result   Acute trace subarachnoid hemorrhage in posterior right temporal lobe, as   detailed above. Stable chronic parenchymal change including atrophy and old white matter   ischemia. Advise follow-up CT head.          CT CERVICAL SPINE WO CONTRAST   Final Result   No acute abnormality of the cervical spine. Stable straightened alignment with minimal C4, C5 retrolisthesis. Stable multilevel bony and discogenic degenerative changes, as detailed above. XR CHEST PORTABLE   Final Result   No acute process. Assessment:    Principal Problem:    Fall  Active Problems:    Traumatic subarachnoid hemorrhage with unknown loss of consciousness status    Facial contusion    Nail avulsion of toe    SAH (subarachnoid hemorrhage) (HCC)  Resolved Problems:    * No resolved hospital problems. *      Plan:  Replace mag and potassium  Dc to Christelle Bowden MD  11:01 AM  12/23/2022    NOTE: This report was transcribed using voice recognition software.  Every effort was made to ensure accuracy; however, inadvertent transcription errors may be present

## 2022-12-24 LAB
ALBUMIN SERPL-MCNC: 3.4 G/DL (ref 3.5–5.2)
ALP BLD-CCNC: 96 U/L (ref 40–129)
ALT SERPL-CCNC: 9 U/L (ref 0–40)
ANION GAP SERPL CALCULATED.3IONS-SCNC: 16 MMOL/L (ref 7–16)
AST SERPL-CCNC: 17 U/L (ref 0–39)
BILIRUB SERPL-MCNC: 1.7 MG/DL (ref 0–1.2)
BUN BLDV-MCNC: 11 MG/DL (ref 6–23)
CALCIUM SERPL-MCNC: 9.5 MG/DL (ref 8.6–10.2)
CHLORIDE BLD-SCNC: 100 MMOL/L (ref 98–107)
CHOLESTEROL, TOTAL: 155 MG/DL (ref 0–199)
CO2: 21 MMOL/L (ref 22–29)
CREAT SERPL-MCNC: 0.8 MG/DL (ref 0.7–1.2)
GFR SERPL CREATININE-BSD FRML MDRD: >60 ML/MIN/1.73
GLUCOSE BLD-MCNC: 90 MG/DL (ref 74–99)
HCT VFR BLD CALC: 33 % (ref 37–54)
HDLC SERPL-MCNC: 40 MG/DL
HEMOGLOBIN: 11.6 G/DL (ref 12.5–16.5)
LDL CHOLESTEROL CALCULATED: 100 MG/DL (ref 0–99)
MCH RBC QN AUTO: 30.6 PG (ref 26–35)
MCHC RBC AUTO-ENTMCNC: 35.2 % (ref 32–34.5)
MCV RBC AUTO: 87.1 FL (ref 80–99.9)
PDW BLD-RTO: 14.4 FL (ref 11.5–15)
PLATELET # BLD: 149 E9/L (ref 130–450)
PMV BLD AUTO: 10.1 FL (ref 7–12)
POTASSIUM SERPL-SCNC: 4.6 MMOL/L (ref 3.5–5)
RBC # BLD: 3.79 E12/L (ref 3.8–5.8)
SODIUM BLD-SCNC: 137 MMOL/L (ref 132–146)
TOTAL PROTEIN: 6.4 G/DL (ref 6.4–8.3)
TRIGL SERPL-MCNC: 76 MG/DL (ref 0–149)
VITAMIN D 25-HYDROXY: 27 NG/ML (ref 30–100)
VLDLC SERPL CALC-MCNC: 15 MG/DL
WBC # BLD: 12.9 E9/L (ref 4.5–11.5)

## 2022-12-26 ENCOUNTER — OUTSIDE SERVICES (OUTPATIENT)
Dept: PRIMARY CARE CLINIC | Age: 72
End: 2022-12-26
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
## 2022-12-26 DIAGNOSIS — I60.9 SAH (SUBARACHNOID HEMORRHAGE) (HCC): Primary | ICD-10-CM

## 2022-12-26 DIAGNOSIS — E78.00 PURE HYPERCHOLESTEROLEMIA: ICD-10-CM

## 2022-12-26 NOTE — CONSULTS
[address]                  [address] [place],  [place]                                  CONSULTATION    PATIENT NAME: Ana Headings                    :        1950  MED REC NO:   [id number]                            ROOM:       8507  ACCOUNT NO:   [de-identified]                           ADMIT DATE: 2022  PROVIDER:     Santos Vu DPM    CONSULT DATE:  2022    HISTORY OF PRESENT ILLNESS:  I was asked to see this 75-year-old male  today for consultation visits in regards to injury to the left #1 digit. He has a fracture of his #1 digit with a subsequent nail avulsion and  injury. He hit his head on the floor after falling downstairs. There  is no loss of consciousness. He is noted to have a small subarachnoid  hemorrhage. He was admitted with kidney injury, hypokalemia, and lactic  acidosis. He is alert, but he is somewhat confused on history today. I  reviewed most of the history through the review of the Epic chart, and I  was asked to see him for consultation visit. He had an avulsion to the  toenail. PAST MEDICAL HISTORY:  Positive for rosacea, hypertension, GERD,  hypercholesterolemia, diverticulitis, and multiple surgeries including  upper GIs, colonoscopies, etc.    SOCIAL HISTORY:  Unremarkable. He is . He is a former smoker  with 25-pack-a-year history of smoking. ALLERGIES:  He has no known drug allergies. REVIEW OF SYSTEMS:  Otherwise unchanged and noncontributory. I reviewed  his meds, his vitals, as well as his white count of 12.2 was reviewed,  his other meds as well. He has no signs of any acute infection of his  foot. He had a previous CT of the abdomen as well, and he is admitted  for evaluation of the digit, IV hydration, and Neuro and Trauma Surgery  consults. PHYSICAL EXAMINATION:  EXTREMITIES:  His exam today is consistent with an avulsion of his #1  digit on the left side. There is noted to be no exposed bone. There is  an intact nail plate. There is radiographic evidence of fracture of the  distal phalanx, which is nondisplaced. There is no drainage. He has  grossly intact sensation. He has no purulence. He has palpable DP and  PT pulses, and there is no range of motion abnormalities. ASSESSMENT:  Previous nail avulsion of #1 digit with contusion and  fracture of #1 toe, left foot. Plan we are going to recommend  conservative treatment with Bactroban and dressing daily. We will see  the patient back in one week for followup in the office. All questions  were answered. I recommend conservative treatment for his digital  problem. All questions were answered. I would like to thank the  Medicine team including Dr. Marilia Robledo for consult in regards to the  patient.         Rodolfo Bush DPM    D: 12/26/2022 6:36:38       T: 12/26/2022 10:39:08     KRISTAL/[email or web address] MANUEL_SARA  Job#: [id number]     Doc#: [id number]    CC:  Pal Berg MD

## 2022-12-27 ENCOUNTER — HOSPITAL ENCOUNTER (EMERGENCY)
Age: 72
Discharge: NURSING, REHAB OR LONG-TERM CARE | End: 2022-12-28
Attending: EMERGENCY MEDICINE
Payer: MEDICARE

## 2022-12-27 ENCOUNTER — APPOINTMENT (OUTPATIENT)
Dept: CT IMAGING | Age: 72
End: 2022-12-27
Payer: MEDICARE

## 2022-12-27 ENCOUNTER — APPOINTMENT (OUTPATIENT)
Dept: GENERAL RADIOLOGY | Age: 72
End: 2022-12-27
Payer: MEDICARE

## 2022-12-27 DIAGNOSIS — R41.82 ALTERED MENTAL STATUS, UNSPECIFIED ALTERED MENTAL STATUS TYPE: Primary | ICD-10-CM

## 2022-12-27 LAB
AMMONIA: 15 UMOL/L (ref 16–60)
ANION GAP SERPL CALCULATED.3IONS-SCNC: 14 MMOL/L (ref 7–16)
B.E.: -0.7 MMOL/L (ref -3–3)
BASOPHILS ABSOLUTE: 0.02 E9/L (ref 0–0.2)
BASOPHILS RELATIVE PERCENT: 0.2 % (ref 0–2)
BUN BLDV-MCNC: 21 MG/DL (ref 6–23)
CALCIUM SERPL-MCNC: 10.1 MG/DL (ref 8.6–10.2)
CHLORIDE BLD-SCNC: 104 MMOL/L (ref 98–107)
CO2: 20 MMOL/L (ref 22–29)
COHB: 0.4 % (ref 0–1.5)
CREAT SERPL-MCNC: 1.1 MG/DL (ref 0.7–1.2)
CRITICAL: ABNORMAL
DATE ANALYZED: ABNORMAL
DATE OF COLLECTION: ABNORMAL
EKG ATRIAL RATE: 97 BPM
EKG P AXIS: -7 DEGREES
EKG P-R INTERVAL: 134 MS
EKG Q-T INTERVAL: 410 MS
EKG QRS DURATION: 88 MS
EKG QTC CALCULATION (BAZETT): 520 MS
EKG R AXIS: 65 DEGREES
EKG T AXIS: 27 DEGREES
EKG VENTRICULAR RATE: 97 BPM
EOSINOPHILS ABSOLUTE: 0.01 E9/L (ref 0.05–0.5)
EOSINOPHILS RELATIVE PERCENT: 0.1 % (ref 0–6)
GFR SERPL CREATININE-BSD FRML MDRD: >60 ML/MIN/1.73
GLUCOSE BLD-MCNC: 142 MG/DL
GLUCOSE BLD-MCNC: 142 MG/DL (ref 74–99)
HCO3: 22 MMOL/L (ref 22–26)
HCT VFR BLD CALC: 39.2 % (ref 37–54)
HEMOGLOBIN: 12.9 G/DL (ref 12.5–16.5)
HHB: 4.6 % (ref 0–5)
IMMATURE GRANULOCYTES #: 0.1 E9/L
IMMATURE GRANULOCYTES %: 1 % (ref 0–5)
LAB: ABNORMAL
LACTIC ACID: 1.2 MMOL/L (ref 0.5–2.2)
LACTIC ACID: 3.7 MMOL/L (ref 0.5–2.2)
LYMPHOCYTES ABSOLUTE: 0.73 E9/L (ref 1.5–4)
LYMPHOCYTES RELATIVE PERCENT: 7.1 % (ref 20–42)
Lab: ABNORMAL
MCH RBC QN AUTO: 29.8 PG (ref 26–35)
MCHC RBC AUTO-ENTMCNC: 32.9 % (ref 32–34.5)
MCV RBC AUTO: 90.5 FL (ref 80–99.9)
METHB: 0.3 % (ref 0–1.5)
MODE: ABNORMAL
MONOCYTES ABSOLUTE: 0.63 E9/L (ref 0.1–0.95)
MONOCYTES RELATIVE PERCENT: 6.1 % (ref 2–12)
NEUTROPHILS ABSOLUTE: 8.82 E9/L (ref 1.8–7.3)
NEUTROPHILS RELATIVE PERCENT: 85.5 % (ref 43–80)
O2 CONTENT: 19.3 ML/DL
O2 SATURATION: 95.4 % (ref 92–98.5)
O2HB: 94.7 % (ref 94–97)
OPERATOR ID: 1893
PATIENT TEMP: 37 C
PCO2: 31.1 MMHG (ref 35–45)
PDW BLD-RTO: 14.6 FL (ref 11.5–15)
PH BLOOD GAS: 7.47 (ref 7.35–7.45)
PLATELET # BLD: 247 E9/L (ref 130–450)
PMV BLD AUTO: 9.7 FL (ref 7–12)
PO2: 73.7 MMHG (ref 75–100)
POTASSIUM REFLEX MAGNESIUM: 5.4 MMOL/L (ref 3.5–5)
RBC # BLD: 4.33 E12/L (ref 3.8–5.8)
REASON FOR REJECTION: NORMAL
REJECTED TEST: NORMAL
SODIUM BLD-SCNC: 138 MMOL/L (ref 132–146)
SOURCE, BLOOD GAS: ABNORMAL
THB: 14.5 G/DL (ref 11.5–16.5)
TIME ANALYZED: 1047
TROPONIN, HIGH SENSITIVITY: 70 NG/L (ref 0–11)
TROPONIN, HIGH SENSITIVITY: 77 NG/L (ref 0–11)
WBC # BLD: 10.3 E9/L (ref 4.5–11.5)

## 2022-12-27 PROCEDURE — 80048 BASIC METABOLIC PNL TOTAL CA: CPT

## 2022-12-27 PROCEDURE — 83605 ASSAY OF LACTIC ACID: CPT

## 2022-12-27 PROCEDURE — 70450 CT HEAD/BRAIN W/O DYE: CPT

## 2022-12-27 PROCEDURE — 87088 URINE BACTERIA CULTURE: CPT

## 2022-12-27 PROCEDURE — 82140 ASSAY OF AMMONIA: CPT

## 2022-12-27 PROCEDURE — 71045 X-RAY EXAM CHEST 1 VIEW: CPT

## 2022-12-27 PROCEDURE — 84484 ASSAY OF TROPONIN QUANT: CPT

## 2022-12-27 PROCEDURE — 93010 ELECTROCARDIOGRAM REPORT: CPT | Performed by: INTERNAL MEDICINE

## 2022-12-27 PROCEDURE — 36415 COLL VENOUS BLD VENIPUNCTURE: CPT

## 2022-12-27 PROCEDURE — 96361 HYDRATE IV INFUSION ADD-ON: CPT

## 2022-12-27 PROCEDURE — 93005 ELECTROCARDIOGRAM TRACING: CPT | Performed by: STUDENT IN AN ORGANIZED HEALTH CARE EDUCATION/TRAINING PROGRAM

## 2022-12-27 PROCEDURE — 81001 URINALYSIS AUTO W/SCOPE: CPT

## 2022-12-27 PROCEDURE — 87186 SC STD MICRODIL/AGAR DIL: CPT

## 2022-12-27 PROCEDURE — 2580000003 HC RX 258: Performed by: STUDENT IN AN ORGANIZED HEALTH CARE EDUCATION/TRAINING PROGRAM

## 2022-12-27 PROCEDURE — 96360 HYDRATION IV INFUSION INIT: CPT

## 2022-12-27 PROCEDURE — 82805 BLOOD GASES W/O2 SATURATION: CPT

## 2022-12-27 PROCEDURE — 85025 COMPLETE CBC W/AUTO DIFF WBC: CPT

## 2022-12-27 PROCEDURE — 87077 CULTURE AEROBIC IDENTIFY: CPT

## 2022-12-27 PROCEDURE — 99285 EMERGENCY DEPT VISIT HI MDM: CPT

## 2022-12-27 RX ORDER — 0.9 % SODIUM CHLORIDE 0.9 %
500 INTRAVENOUS SOLUTION INTRAVENOUS ONCE
Status: COMPLETED | OUTPATIENT
Start: 2022-12-27 | End: 2022-12-27

## 2022-12-27 RX ADMIN — SODIUM CHLORIDE 500 ML: 9 INJECTION, SOLUTION INTRAVENOUS at 12:58

## 2022-12-27 RX ADMIN — SODIUM CHLORIDE 500 ML: 9 INJECTION, SOLUTION INTRAVENOUS at 17:41

## 2022-12-27 RX ADMIN — SODIUM CHLORIDE 500 ML: 9 INJECTION, SOLUTION INTRAVENOUS at 15:10

## 2022-12-27 RX ADMIN — SODIUM CHLORIDE 500 ML: 9 INJECTION, SOLUTION INTRAVENOUS at 10:26

## 2022-12-27 ASSESSMENT — PAIN - FUNCTIONAL ASSESSMENT: PAIN_FUNCTIONAL_ASSESSMENT: NONE - DENIES PAIN

## 2022-12-27 NOTE — ED PROVIDER NOTES
HPI  74 y.o. male presenting for AMS. Symptoms have been present for 1 day. They have been constant and moderate in severity. They are worsened by nothing and relieved by nothing. Per triage note, patient is nonverbal at baseline and NH had difficulty arousing patient. On exam, patient awakens to stimuli and follows commands. However, he cannot provide any meaningful history. --------------------------------------------- PAST HISTORY ---------------------------------------------  Past Medical History:  has a past medical history of Diverticulitis, GERD (gastroesophageal reflux disease), High cholesterol, Hypertension, and Rosacea. Past Surgical History:  has a past surgical history that includes other surgical history (3/10/14); Colonoscopy (7/2/2012); Colonoscopy (08/04/2014); Endoscopy, colon, diagnostic (11/07/2016); and Upper gastrointestinal endoscopy (N/A, 6/10/2019). Social History:  reports that he quit smoking about 39 years ago. His smoking use included cigarettes. He has a 25.00 pack-year smoking history. He has never used smokeless tobacco. He reports that he does not currently use alcohol. He reports that he does not use drugs. Family History: family history includes Atrial Fibrillation in his brother; No Known Problems in his father and sister; Other in his mother. The patients home medications have been reviewed. Allergies: Patient has no known allergies. Review of Systems   Unable to perform ROS: Mental status change      Physical Exam  Constitutional:       General: He is not in acute distress. Appearance: Normal appearance. He is not ill-appearing. HENT:      Head: Normocephalic and atraumatic. Right Ear: External ear normal.      Left Ear: External ear normal.      Nose: Nose normal.   Eyes:      Conjunctiva/sclera: Conjunctivae normal.   Cardiovascular:      Rate and Rhythm: Regular rhythm. Tachycardia present.    Pulmonary:      Effort: Pulmonary effort is normal. No respiratory distress. Breath sounds: Normal breath sounds. No stridor. No wheezing, rhonchi or rales. Abdominal:      General: There is no distension. Palpations: Abdomen is soft. Tenderness: There is no abdominal tenderness. Musculoskeletal:         General: No swelling or deformity. Skin:     General: Skin is warm and dry. Neurological:      General: No focal deficit present. Mental Status: He is alert. Comments: Follows commands   Psychiatric:         Mood and Affect: Mood normal.        Procedures     ED Course as of 12/28/22 0851   Tue Dec 27, 2022   1022 EKG @ 1006:   This EKG is signed and interpreted by me  Rate: 97  Rhythm: Sinus  Interpretation: no acute ST elevations or depressions, no acute T wave changes, Qtc 520  Comparison: stable as compared to patient's most recent EKG   [AP]      ED Course User Index  [AP] Jose Sol MD       -------------------------------------------------- RESULTS -------------------------------------------------  Labs:  Results for orders placed or performed during the hospital encounter of 12/27/22   CBC with Auto Differential   Result Value Ref Range    WBC 10.3 4.5 - 11.5 E9/L    RBC 4.33 3.80 - 5.80 E12/L    Hemoglobin 12.9 12.5 - 16.5 g/dL    Hematocrit 39.2 37.0 - 54.0 %    MCV 90.5 80.0 - 99.9 fL    MCH 29.8 26.0 - 35.0 pg    MCHC 32.9 32.0 - 34.5 %    RDW 14.6 11.5 - 15.0 fL    Platelets 404 293 - 238 E9/L    MPV 9.7 7.0 - 12.0 fL    Neutrophils % 85.5 (H) 43.0 - 80.0 %    Immature Granulocytes % 1.0 0.0 - 5.0 %    Lymphocytes % 7.1 (L) 20.0 - 42.0 %    Monocytes % 6.1 2.0 - 12.0 %    Eosinophils % 0.1 0.0 - 6.0 %    Basophils % 0.2 0.0 - 2.0 %    Neutrophils Absolute 8.82 (H) 1.80 - 7.30 E9/L    Immature Granulocytes # 0.10 E9/L    Lymphocytes Absolute 0.73 (L) 1.50 - 4.00 E9/L    Monocytes Absolute 0.63 0.10 - 0.95 E9/L    Eosinophils Absolute 0.01 (L) 0.05 - 0.50 E9/L    Basophils Absolute 0.02 0.00 - 0.20 E9/L   Basic Metabolic Panel w/ Reflex to MG   Result Value Ref Range    Sodium 138 132 - 146 mmol/L    Potassium reflex Magnesium 5.4 (H) 3.5 - 5.0 mmol/L    Chloride 104 98 - 107 mmol/L    CO2 20 (L) 22 - 29 mmol/L    Anion Gap 14 7 - 16 mmol/L    Glucose 142 (H) 74 - 99 mg/dL    BUN 21 6 - 23 mg/dL    Creatinine 1.1 0.7 - 1.2 mg/dL    Est, Glom Filt Rate >60 >=60 mL/min/1.73    Calcium 10.1 8.6 - 10.2 mg/dL   Lactic Acid   Result Value Ref Range    Lactic Acid 3.7 (HH) 0.5 - 2.2 mmol/L   Ammonia   Result Value Ref Range    Ammonia 15.0 (L) 16.0 - 60.0 umol/L   Blood Gas, Arterial   Result Value Ref Range    Date Analyzed 20221227     Time Analyzed 1047     Source: Blood Arterial     pH, Blood Gas 7.467 (H) 7.350 - 7.450    PCO2 31.1 (L) 35.0 - 45.0 mmHg    PO2 73.7 (L) 75.0 - 100.0 mmHg    HCO3 22.0 22.0 - 26.0 mmol/L    B.E. -0.7 -3.0 - 3.0 mmol/L    O2 Sat 95.4 92.0 - 98.5 %    O2Hb 94.7 94.0 - 97.0 %    COHb 0.4 0.0 - 1.5 %    MetHb 0.3 0.0 - 1.5 %    O2 Content 19.3 mL/dL    HHb 4.6 0.0 - 5.0 %    tHb (est) 14.5 11.5 - 16.5 g/dL    Mode RA     Date Of Collection      Time Collected      Pt Temp 37.0 C     ID [id number]     Lab [id number]     Critical(s) Notified .  No Critical Values    Troponin   Result Value Ref Range    Troponin, High Sensitivity 70 (H) 0 - 11 ng/L   SPECIMEN REJECTION   Result Value Ref Range    Rejected Test trop     Reason for Rejection see below    Lactic Acid   Result Value Ref Range    Lactic Acid 1.2 0.5 - 2.2 mmol/L   Troponin   Result Value Ref Range    Troponin, High Sensitivity 77 (H) 0 - 11 ng/L   Urinalysis with Microscopic   Result Value Ref Range    Color, UA Yellow Straw/Yellow    Clarity, UA Clear Clear    Glucose, Ur Negative Negative mg/dL    Bilirubin Urine Negative Negative    Ketones, Urine TRACE (A) Negative mg/dL    Specific Gravity, UA 1.025 1.005 - 1.030    Blood, Urine LARGE (A) Negative    pH, UA 5.5 5.0 - 9.0    Protein, UA TRACE Negative mg/dL    Urobilinogen, Urine 1.0 <2.0 E. U./dL    Nitrite, Urine Negative Negative    Leukocyte Esterase, Urine TRACE (A) Negative    WBC, UA 2-5 0 - 5 /HPF    RBC, UA >20 0 - 2 /HPF    Bacteria, UA MANY (A) None Seen /HPF   POCT glucose   Result Value Ref Range    Glucose 142 mg/dL   EKG 12 Lead   Result Value Ref Range    Ventricular Rate 97 BPM    Atrial Rate 97 BPM    P-R Interval 134 ms    QRS Duration 88 ms    Q-T Interval 410 ms    QTc Calculation (Bazett) 520 ms    P Axis -7 degrees    R Axis 65 degrees    T Axis 27 degrees       Radiology:  XR CHEST PORTABLE   Final Result   No acute process. CT HEAD WO CONTRAST   Final Result   1. Redemonstration of subarachnoid hemorrhage involving right temporal lobe   sulci. This hemorrhage is less apparent compared with prior from December 20th. 2. No new intracranial hemorrhage or edema. ------------------------- NURSING NOTES AND VITALS REVIEWED ---------------------------  Date / Time Roomed:  12/27/2022  9:29 AM  ED Bed Assignment:  11/11    The nursing notes within the ED encounter and vital signs as below have been reviewed. BP (!) 148/79   Pulse 74   Temp 98.1 °F (36.7 °C) (Oral)   Resp 18   Wt 135 lb (61.2 kg)   SpO2 98%   BMI 20.53 kg/m²   Oxygen Saturation Interpretation: Normal    MDM  Number of Diagnoses or Management Options  Altered mental status, unspecified altered mental status type  Diagnosis management comments: 75 yo male sent from NH for AMS. Chart review patient recently admitted with [facility], discharged on keppra. Labs showed lactic acidosis, which downtrended with fluids. Troponin similar to previous results. CT head showed less apparent SAH. Wife presented to bedside and stated that patient's mental status was better than when she had seen him on [place] day. UA pending. Keppra dose increased. Patient signed out to oncoming physician with plan to discharge back to NH pending urine results.               --------------------------------- ADDITIONAL PROVIDER NOTES ---------------------------------  At this time the patient is without objective evidence of an acute process requiring hospitalization or inpatient management. They have remained hemodynamically stable throughout their entire ED visit and are stable for discharge with outpatient follow-up. The plan has been discussed in detail and they are aware of the specific conditions for emergent return, as well as the importance of follow-up. Discharge Medication List as of 12/28/2022  5:15 AM          Diagnosis:  1. Altered mental status, unspecified altered mental status type        Disposition:  Patient's disposition: Discharge to nursing home  Patient's condition is stable.           Oziel Flores MD  Resident  12/28/22 [id number]       Oziel Flores MD  Resident  12/28/22 9153

## 2022-12-27 NOTE — ED PROVIDER NOTES
ATTENDING PROVIDER ATTESTATION:     Trey Ribera presented to the emergency department for evaluation of Altered Mental Status (Sent from [facility] for increased altered mental status . Patient alert but nonverbal at baseline . Per facility patient was hard to arouse this am . Patient alert but nonverbal during triage )   and was initially evaluated by the Medical Resident. See Original ED Note for H&P and ED course above. I have reviewed and discussed the case, including pertinent history (medical, surgical, family and social) and exam findings with the Medical Resident assigned to Trey Ribera. I have personally performed and/or participated in the history, exam, medical decision making, and procedures and agree with all pertinent clinical information and any additional changes or corrections are noted below in my assessment and plan. I have discussed this patient in detail with the resident, and provided the instruction and education,       I have reviewed my findings and recommendations with the assigned Medical Resident, Trey Ribera and members of family present at the time of disposition. I have performed a history and physical examination of this patient and directly supervised the resident caring for this patient      History of Present Illness:    Presents to the ED for altered mental status, beginning prior to arrival.  The complaint has been constant, moderate in severity, and worsened by nothing. Sent for increased lethargy. Non verbal at baseline so he cannot provide any history. Sent here for increased lethargy. He nods his head to questions and seems to be denying any complaints or pain. He is awake and alert on arrival.      ENCOUNTER LIMITATION:    Please note that the HPI, ROS, Past History, and Physical Examination are limited due to this patients mental status, confusion, dementia, and acuity of illness.         Review of Systems:   A complete review of systems was unable to be performed secondary to the limitations noted above    --------------------------------------------- PAST HISTORY ---------------------------------------------  Past Medical History:  has a past medical history of Diverticulitis, GERD (gastroesophageal reflux disease), High cholesterol, Hypertension, and Rosacea. Past Surgical History:  has a past surgical history that includes other surgical history (3/10/14); Colonoscopy (7/2/2012); Colonoscopy (08/04/2014); Endoscopy, colon, diagnostic (11/07/2016); and Upper gastrointestinal endoscopy (N/A, 6/10/2019). Social History:  reports that he quit smoking about 39 years ago. His smoking use included cigarettes. He has a 25.00 pack-year smoking history. He has never used smokeless tobacco. He reports that he does not currently use alcohol. He reports that he does not use drugs. Family History: family history includes Atrial Fibrillation in his brother; No Known Problems in his father and sister; Other in his mother. Unless otherwise noted, family history is non contributory    The patients home medications have been reviewed. Allergies: Patient has no known allergies. EKG Interpretation  Interpreted by emergency department physician, Dr. Toro Griggs     12/27/22  Time: 1006    Rhythm: normal sinus   Rate: normal  Axis: normal  Conduction: normal  ST Segments: no acute change  T Waves: no acute change    Clinical Impression: Sinus rhythm, no acute ischemic changes    Comparison to Old EKG  Stable from prior EKG      Physical Exam:  Constitutional/General: Alert and awake. Nodding head to questions. Head: Normocephalic and atraumatic  Eyes: PERRL, EOMI, sclera non icteric  ENT: Oropharynx clear, handling secretions  Neck: Supple, full ROM, no stridor, no meningeal signs  Respiratory: Lungs clear to auscultation bilaterally, no wheezes, rales, or rhonchi. Not in respiratory distress  Cardiovascular:  Regular rate. Regular rhythm.  No murmurs, no gallops, no rubs. 2+ distal pulses. Equal extremity pulses. GI:  Abdomen Soft, Non tender, Non distended. No rebound, guarding, or rigidity. No pulsatile masses. Musculoskeletal: Moves all extremities x 4. Warm and well perfused,  no clubbing, no cyanosis, no edema. Palpable peripheral pulses  Integument: skin warm and dry. No rashes. Neurologic:awake and alert. At baseline (family here). Nodding head to questions. No focal deficits. Psychiatric: Normal Affect      I directly supervised any procedures performed by the resident and was present for the procedure including all critical portions of the procedure          I, Dr. Josh Neves, am the primary provider of record      Medical Decision Making:     ED Course as of 12/27/22 2126   Tue Dec 27, 2022   1022 EKG @ 1006: This EKG is signed and interpreted by me  Rate: 97  Rhythm: Sinus  Interpretation: no acute ST elevations or depressions, no acute T wave changes, Qtc 520  Comparison: stable as compared to patient's most recent EKG   [AP]      ED Course User Index  [AP] Isac Sacks, MD       Suspicious he may have had a seizure as he had decreased responsiveness prior to arrival. He was incontinent of urine prior to arrival. He is back to normal. Testing reassuring. CT improved. No indication for admission. Keppra dose increased.      While not exhaustive, the aforementioned following diagnoses and their severity were considered:     Independent interpretation of Laboratory tests by Eliezer Cm MD:      Results for orders placed or performed during the hospital encounter of 12/27/22   CBC with Auto Differential   Result Value Ref Range    WBC 10.3 4.5 - 11.5 E9/L    RBC 4.33 3.80 - 5.80 E12/L    Hemoglobin 12.9 12.5 - 16.5 g/dL    Hematocrit 39.2 37.0 - 54.0 %    MCV 90.5 80.0 - 99.9 fL    MCH 29.8 26.0 - 35.0 pg    MCHC 32.9 32.0 - 34.5 %    RDW 14.6 11.5 - 15.0 fL    Platelets 692 879 - 652 E9/L    MPV 9.7 7.0 - 12.0 fL    Neutrophils % 85.5 (H) 43.0 - 80.0 % Immature Granulocytes % 1.0 0.0 - 5.0 %    Lymphocytes % 7.1 (L) 20.0 - 42.0 %    Monocytes % 6.1 2.0 - 12.0 %    Eosinophils % 0.1 0.0 - 6.0 %    Basophils % 0.2 0.0 - 2.0 %    Neutrophils Absolute 8.82 (H) 1.80 - 7.30 E9/L    Immature Granulocytes # 0.10 E9/L    Lymphocytes Absolute 0.73 (L) 1.50 - 4.00 E9/L    Monocytes Absolute 0.63 0.10 - 0.95 E9/L    Eosinophils Absolute 0.01 (L) 0.05 - 0.50 E9/L    Basophils Absolute 0.02 0.00 - 0.20 V2/P   Basic Metabolic Panel w/ Reflex to MG   Result Value Ref Range    Sodium 138 132 - 146 mmol/L    Potassium reflex Magnesium 5.4 (H) 3.5 - 5.0 mmol/L    Chloride 104 98 - 107 mmol/L    CO2 20 (L) 22 - 29 mmol/L    Anion Gap 14 7 - 16 mmol/L    Glucose 142 (H) 74 - 99 mg/dL    BUN 21 6 - 23 mg/dL    Creatinine 1.1 0.7 - 1.2 mg/dL    Est, Glom Filt Rate >60 >=60 mL/min/1.73    Calcium 10.1 8.6 - 10.2 mg/dL   Lactic Acid   Result Value Ref Range    Lactic Acid 3.7 (HH) 0.5 - 2.2 mmol/L   Ammonia   Result Value Ref Range    Ammonia 15.0 (L) 16.0 - 60.0 umol/L   Blood Gas, Arterial   Result Value Ref Range    Date Analyzed 20221227     Time Analyzed 1047     Source: Blood Arterial     pH, Blood Gas 7.467 (H) 7.350 - 7.450    PCO2 31.1 (L) 35.0 - 45.0 mmHg    PO2 73.7 (L) 75.0 - 100.0 mmHg    HCO3 22.0 22.0 - 26.0 mmol/L    B.E. -0.7 -3.0 - 3.0 mmol/L    O2 Sat 95.4 92.0 - 98.5 %    O2Hb 94.7 94.0 - 97.0 %    COHb 0.4 0.0 - 1.5 %    MetHb 0.3 0.0 - 1.5 %    O2 Content 19.3 mL/dL    HHb 4.6 0.0 - 5.0 %    tHb (est) 14.5 11.5 - 16.5 g/dL    Mode RA     Date Of Collection      Time Collected      Pt Temp 37.0 C     ID 1893     Lab [id number]     Critical(s) Notified .  No Critical Values    Troponin   Result Value Ref Range    Troponin, High Sensitivity 70 (H) 0 - 11 ng/L   SPECIMEN REJECTION   Result Value Ref Range    Rejected Test trop     Reason for Rejection see below    Lactic Acid   Result Value Ref Range    Lactic Acid 1.2 0.5 - 2.2 mmol/L   Troponin   Result Value Ref Range    Troponin, High Sensitivity 77 (H) 0 - 11 ng/L   POCT glucose   Result Value Ref Range    Glucose 142 mg/dL   EKG 12 Lead   Result Value Ref Range    Ventricular Rate 97 BPM    Atrial Rate 97 BPM    P-R Interval 134 ms    QRS Duration 88 ms    Q-T Interval 410 ms    QTc Calculation (Bazett) 520 ms    P Axis -7 degrees    R Axis 65 degrees    T Axis 27 degrees         Independent wet read interpretation of Radiology tests by Salinas Flower MD:        Final Read By radiology  XR CHEST PORTABLE   Final Result   No acute process. CT HEAD WO CONTRAST   Final Result   1. Redemonstration of subarachnoid hemorrhage involving right temporal lobe   sulci. This hemorrhage is less apparent compared with prior from December 20th. 2. No new intracranial hemorrhage or edema. Name and Route of medications administered in the ED:  Medications   0.9 % sodium chloride bolus (0 mLs IntraVENous Stopped 12/27/22 1115)   0.9 % sodium chloride bolus (0 mLs IntraVENous Stopped 12/27/22 1334)   0.9 % sodium chloride bolus (0 mLs IntraVENous Stopped 12/27/22 1715)   0.9 % sodium chloride bolus (0 mLs IntraVENous Stopped 12/27/22 1900)            Re-Evaluations:      improving      This patient's ED course included: a personal history and physicial examination, re-evaluation prior to disposition, multiple bedside re-evaluations, IV medications, and complex medical decision making and emergency management    This patient has remained hemodynamically stable during their ED course.         1. Altered mental status, unspecified altered mental status type            Brandon Smalls MD  12/27/22 2124

## 2022-12-28 VITALS
DIASTOLIC BLOOD PRESSURE: 79 MMHG | HEART RATE: 74 BPM | SYSTOLIC BLOOD PRESSURE: 148 MMHG | OXYGEN SATURATION: 98 % | BODY MASS INDEX: 20.53 KG/M2 | WEIGHT: 135 LBS | TEMPERATURE: 98.1 F | RESPIRATION RATE: 18 BRPM

## 2022-12-28 LAB
BACTERIA: ABNORMAL /HPF
BILIRUBIN URINE: NEGATIVE
BLOOD, URINE: ABNORMAL
CLARITY: CLEAR
COLOR: YELLOW
GLUCOSE URINE: NEGATIVE MG/DL
KEPPRA: 12 UG/ML (ref 10–40)
KETONES, URINE: ABNORMAL MG/DL
LEUKOCYTE ESTERASE, URINE: ABNORMAL
NITRITE, URINE: NEGATIVE
PH UA: 5.5 (ref 5–9)
PROTEIN UA: ABNORMAL MG/DL
RBC UA: >20 /HPF (ref 0–2)
SPECIFIC GRAVITY UA: 1.02 (ref 1–1.03)
UROBILINOGEN, URINE: 1 E.U./DL
WBC UA: ABNORMAL /HPF (ref 0–5)

## 2022-12-28 NOTE — ED NOTES
1:41 AM EST  I received this patient at sign out from Dr. Kenny Mcrae. I have discussed the patient's initial exam, treatment and plan of care with the out going physician. I have introduced my self to the patient / family and have answered their questions to this point. I have examined the patient myself and reviewed ordered tests / medications and  reviewed any available results to this point. If a resident is involved in the Emergency Department care, I have discussed my findings and plan with them as well. Patient was signed out to me pending urinalysis. Urine shows trace leukocytes and negative nitrites. Only 2-5 white blood cells and bacteria is present. More likely to be contamination from catheter. He is nonverbal at baseline apparently. He is also afebrile and does not have an elevated white blood cell count. Urine will be sent for culture and patient discharged back to facility in stable condition.       Yamilex Hudson, DO  12/28/22 0142       Yamilex Hudson, DO  12/28/22 0868

## 2022-12-30 DIAGNOSIS — I60.9 SAH (SUBARACHNOID HEMORRHAGE) (HCC): Primary | ICD-10-CM

## 2022-12-30 LAB
ALBUMIN SERPL-MCNC: 2.7 G/DL (ref 3.5–5.2)
ALP BLD-CCNC: 105 U/L (ref 40–129)
ALT SERPL-CCNC: 19 U/L (ref 0–40)
ANION GAP SERPL CALCULATED.3IONS-SCNC: 14 MMOL/L (ref 7–16)
AST SERPL-CCNC: 33 U/L (ref 0–39)
BASOPHILS ABSOLUTE: 0.14 E9/L (ref 0–0.2)
BASOPHILS RELATIVE PERCENT: 1.8 % (ref 0–2)
BILIRUB SERPL-MCNC: 0.6 MG/DL (ref 0–1.2)
BUN BLDV-MCNC: 16 MG/DL (ref 6–23)
BURR CELLS: ABNORMAL
CALCIUM SERPL-MCNC: 9.6 MG/DL (ref 8.6–10.2)
CHLORIDE BLD-SCNC: 114 MMOL/L (ref 98–107)
CO2: 21 MMOL/L (ref 22–29)
CREAT SERPL-MCNC: 0.9 MG/DL (ref 0.7–1.2)
EOSINOPHILS ABSOLUTE: 0 E9/L (ref 0.05–0.5)
EOSINOPHILS RELATIVE PERCENT: 1.5 % (ref 0–6)
GFR SERPL CREATININE-BSD FRML MDRD: >60 ML/MIN/1.73
GLUCOSE BLD-MCNC: 67 MG/DL (ref 74–99)
HCT VFR BLD CALC: 32.3 % (ref 37–54)
HEMOGLOBIN: 10 G/DL (ref 12.5–16.5)
LYMPHOCYTES ABSOLUTE: 1.29 E9/L (ref 1.5–4)
LYMPHOCYTES RELATIVE PERCENT: 16.8 % (ref 20–42)
MCH RBC QN AUTO: 30.2 PG (ref 26–35)
MCHC RBC AUTO-ENTMCNC: 31 % (ref 32–34.5)
MCV RBC AUTO: 97.6 FL (ref 80–99.9)
MONOCYTES ABSOLUTE: 0.15 E9/L (ref 0.1–0.95)
MONOCYTES RELATIVE PERCENT: 1.8 % (ref 2–12)
NEUTROPHILS ABSOLUTE: 6.08 E9/L (ref 1.8–7.3)
NEUTROPHILS RELATIVE PERCENT: 79.6 % (ref 43–80)
OVALOCYTES: ABNORMAL
PDW BLD-RTO: 14.8 FL (ref 11.5–15)
PLATELET # BLD: 221 E9/L (ref 130–450)
PMV BLD AUTO: 9.8 FL (ref 7–12)
POIKILOCYTES: ABNORMAL
POTASSIUM SERPL-SCNC: 4.5 MMOL/L (ref 3.5–5)
RBC # BLD: 3.31 E12/L (ref 3.8–5.8)
SODIUM BLD-SCNC: 149 MMOL/L (ref 132–146)
TOTAL PROTEIN: 6 G/DL (ref 6.4–8.3)
WBC # BLD: 7.6 E9/L (ref 4.5–11.5)

## 2022-12-31 LAB
ORGANISM: ABNORMAL
ORGANISM: ABNORMAL
URINE CULTURE, ROUTINE: ABNORMAL
URINE CULTURE, ROUTINE: ABNORMAL

## 2023-01-01 ENCOUNTER — OUTSIDE SERVICES (OUTPATIENT)
Dept: PRIMARY CARE CLINIC | Age: 73
End: 2023-01-01

## 2023-01-01 ENCOUNTER — HOSPITAL ENCOUNTER (EMERGENCY)
Age: 73
End: 2023-01-17
Attending: EMERGENCY MEDICINE
Payer: MEDICARE

## 2023-01-01 ENCOUNTER — APPOINTMENT (OUTPATIENT)
Dept: CT IMAGING | Age: 73
DRG: 871 | End: 2023-01-01
Payer: MEDICARE

## 2023-01-01 ENCOUNTER — HOSPITAL ENCOUNTER (INPATIENT)
Age: 73
LOS: 5 days | Discharge: HOSPICE | DRG: 871 | End: 2023-01-15
Attending: EMERGENCY MEDICINE | Admitting: INTERNAL MEDICINE
Payer: MEDICARE

## 2023-01-01 ENCOUNTER — APPOINTMENT (OUTPATIENT)
Dept: GENERAL RADIOLOGY | Age: 73
DRG: 871 | End: 2023-01-01
Payer: MEDICARE

## 2023-01-01 ENCOUNTER — HOSPITAL ENCOUNTER (OUTPATIENT)
Dept: CT IMAGING | Age: 73
Discharge: HOME | End: 2023-01-19

## 2023-01-01 VITALS
HEIGHT: 66 IN | HEART RATE: 76 BPM | SYSTOLIC BLOOD PRESSURE: 154 MMHG | BODY MASS INDEX: 20.89 KG/M2 | DIASTOLIC BLOOD PRESSURE: 80 MMHG | TEMPERATURE: 97.3 F | WEIGHT: 130 LBS | RESPIRATION RATE: 18 BRPM | OXYGEN SATURATION: 98 %

## 2023-01-01 DIAGNOSIS — M62.81 MUSCLE WEAKNESS (GENERALIZED): ICD-10-CM

## 2023-01-01 DIAGNOSIS — S00.83XD CONTUSION OF FACE, SUBSEQUENT ENCOUNTER: ICD-10-CM

## 2023-01-01 DIAGNOSIS — R33.9 URINE RETENTION: ICD-10-CM

## 2023-01-01 DIAGNOSIS — L71.9 ROSACEA: ICD-10-CM

## 2023-01-01 DIAGNOSIS — I60.9 SAH (SUBARACHNOID HEMORRHAGE) (HCC): Primary | ICD-10-CM

## 2023-01-01 DIAGNOSIS — I10 BENIGN ESSENTIAL HYPERTENSION: ICD-10-CM

## 2023-01-01 DIAGNOSIS — A41.9 SEPSIS WITH ACUTE RENAL FAILURE, DUE TO UNSPECIFIED ORGANISM, UNSPECIFIED ACUTE RENAL FAILURE TYPE, UNSPECIFIED WHETHER SEPTIC SHOCK PRESENT (HCC): Primary | ICD-10-CM

## 2023-01-01 DIAGNOSIS — R41.841 COGNITIVE COMMUNICATION DEFICIT: ICD-10-CM

## 2023-01-01 DIAGNOSIS — G47.00 INSOMNIA, UNSPECIFIED TYPE: ICD-10-CM

## 2023-01-01 DIAGNOSIS — I46.9 CARDIAC ARREST (HCC): Primary | ICD-10-CM

## 2023-01-01 DIAGNOSIS — R13.10 DYSPHAGIA, UNSPECIFIED TYPE: ICD-10-CM
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
## 2023-01-01 DIAGNOSIS — R27.0 ATAXIA: ICD-10-CM

## 2023-01-01 DIAGNOSIS — M62.81 MUSCLE WEAKNESS: ICD-10-CM

## 2023-01-01 DIAGNOSIS — K21.9 GASTROESOPHAGEAL REFLUX DISEASE, UNSPECIFIED WHETHER ESOPHAGITIS PRESENT: ICD-10-CM

## 2023-01-01 DIAGNOSIS — F32.1 CURRENT MODERATE EPISODE OF MAJOR DEPRESSIVE DISORDER WITHOUT PRIOR EPISODE (HCC): ICD-10-CM

## 2023-01-01 DIAGNOSIS — G93.41 METABOLIC ENCEPHALOPATHY: ICD-10-CM

## 2023-01-01 DIAGNOSIS — N31.9 NEUROMUSCULAR DYSFUNCTION OF BLADDER: ICD-10-CM

## 2023-01-01 DIAGNOSIS — N17.9 ACUTE RENAL FAILURE, UNSPECIFIED ACUTE RENAL FAILURE TYPE (HCC): ICD-10-CM

## 2023-01-01 DIAGNOSIS — E78.00 PURE HYPERCHOLESTEROLEMIA: ICD-10-CM

## 2023-01-01 DIAGNOSIS — N17.9 SEPSIS WITH ACUTE RENAL FAILURE, DUE TO UNSPECIFIED ORGANISM, UNSPECIFIED ACUTE RENAL FAILURE TYPE, UNSPECIFIED WHETHER SEPTIC SHOCK PRESENT (HCC): Primary | ICD-10-CM

## 2023-01-01 DIAGNOSIS — R65.20 SEPSIS WITH ACUTE RENAL FAILURE, DUE TO UNSPECIFIED ORGANISM, UNSPECIFIED ACUTE RENAL FAILURE TYPE, UNSPECIFIED WHETHER SEPTIC SHOCK PRESENT (HCC): Primary | ICD-10-CM

## 2023-01-01 DIAGNOSIS — R26.2 DIFFICULTY WALKING: ICD-10-CM

## 2023-01-01 DIAGNOSIS — N39.0 COMPLICATED UTI (URINARY TRACT INFECTION): ICD-10-CM

## 2023-01-01 DIAGNOSIS — I60.9 SAH (SUBARACHNOID HEMORRHAGE) (HCC): ICD-10-CM

## 2023-01-01 LAB
ADENOVIRUS BY PCR: NOT DETECTED
ALBUMIN SERPL-MCNC: 3 G/DL (ref 3.5–5.2)
ALP BLD-CCNC: 166 U/L (ref 40–129)
ALT SERPL-CCNC: 99 U/L (ref 0–40)
ANION GAP SERPL CALCULATED.3IONS-SCNC: 10 MMOL/L (ref 7–16)
ANION GAP SERPL CALCULATED.3IONS-SCNC: 12 MMOL/L (ref 7–16)
ANION GAP SERPL CALCULATED.3IONS-SCNC: 12 MMOL/L (ref 7–16)
ANION GAP SERPL CALCULATED.3IONS-SCNC: 15 MMOL/L (ref 7–16)
APTT: 33.4 SEC (ref 24.5–35.1)
AST SERPL-CCNC: 130 U/L (ref 0–39)
B.E.: -1.2 MMOL/L (ref -3–3)
BACTERIA: ABNORMAL /HPF
BASOPHILS ABSOLUTE: 0.01 E9/L (ref 0–0.2)
BASOPHILS RELATIVE PERCENT: 0.1 % (ref 0–2)
BILIRUB SERPL-MCNC: 0.9 MG/DL (ref 0–1.2)
BILIRUBIN URINE: NEGATIVE
BLOOD CULTURE, ROUTINE: NORMAL
BLOOD, URINE: ABNORMAL
BORDETELLA PARAPERTUSSIS BY PCR: NOT DETECTED
BORDETELLA PERTUSSIS BY PCR: NOT DETECTED
BUN BLDV-MCNC: 19 MG/DL (ref 6–23)
BUN BLDV-MCNC: 25 MG/DL (ref 6–23)
BUN BLDV-MCNC: 30 MG/DL (ref 6–23)
BUN BLDV-MCNC: 35 MG/DL (ref 6–23)
BUN BLDV-MCNC: 37 MG/DL (ref 6–23)
BUN BLDV-MCNC: 47 MG/DL (ref 6–23)
CALCIUM SERPL-MCNC: 10.1 MG/DL (ref 8.6–10.2)
CALCIUM SERPL-MCNC: 8 MG/DL (ref 8.6–10.2)
CALCIUM SERPL-MCNC: 8.3 MG/DL (ref 8.6–10.2)
CALCIUM SERPL-MCNC: 8.5 MG/DL (ref 8.6–10.2)
CALCIUM SERPL-MCNC: 8.7 MG/DL (ref 8.6–10.2)
CALCIUM SERPL-MCNC: 9 MG/DL (ref 8.6–10.2)
CHLAMYDOPHILIA PNEUMONIAE BY PCR: NOT DETECTED
CHLORIDE BLD-SCNC: 115 MMOL/L (ref 98–107)
CHLORIDE BLD-SCNC: 117 MMOL/L (ref 98–107)
CHLORIDE BLD-SCNC: 117 MMOL/L (ref 98–107)
CHLORIDE BLD-SCNC: 124 MMOL/L (ref 98–107)
CHLORIDE BLD-SCNC: 127 MMOL/L (ref 98–107)
CHLORIDE BLD-SCNC: 130 MMOL/L (ref 98–107)
CLARITY: CLEAR
CO2: 14 MMOL/L (ref 22–29)
CO2: 16 MMOL/L (ref 22–29)
CO2: 18 MMOL/L (ref 22–29)
CO2: 18 MMOL/L (ref 22–29)
CO2: 19 MMOL/L (ref 22–29)
CO2: 21 MMOL/L (ref 22–29)
COHB: 0.3 % (ref 0–1.5)
COLOR: YELLOW
CORONAVIRUS 229E BY PCR: NOT DETECTED
CORONAVIRUS HKU1 BY PCR: NOT DETECTED
CORONAVIRUS NL63 BY PCR: NOT DETECTED
CORONAVIRUS OC43 BY PCR: NOT DETECTED
CORTISOL TOTAL: 42.34 MCG/DL (ref 2.68–18.4)
CREAT SERPL-MCNC: 0.8 MG/DL (ref 0.7–1.2)
CREAT SERPL-MCNC: 0.9 MG/DL (ref 0.7–1.2)
CREAT SERPL-MCNC: 1 MG/DL (ref 0.7–1.2)
CREAT SERPL-MCNC: 1.1 MG/DL (ref 0.7–1.2)
CREAT SERPL-MCNC: 1.1 MG/DL (ref 0.7–1.2)
CREAT SERPL-MCNC: 1.8 MG/DL (ref 0.7–1.2)
CRITICAL: ABNORMAL
CRYSTALS, UA: ABNORMAL /HPF
CULTURE, BLOOD 2: NORMAL
DATE ANALYZED: ABNORMAL
DATE OF COLLECTION: ABNORMAL
EKG ATRIAL RATE: 101 BPM
EKG P AXIS: 55 DEGREES
EKG P-R INTERVAL: 122 MS
EKG Q-T INTERVAL: 378 MS
EKG QRS DURATION: 82 MS
EKG QTC CALCULATION (BAZETT): 490 MS
EKG R AXIS: 77 DEGREES
EKG T AXIS: 61 DEGREES
EKG VENTRICULAR RATE: 101 BPM
EOSINOPHILS ABSOLUTE: 0 E9/L (ref 0.05–0.5)
EOSINOPHILS RELATIVE PERCENT: 0 % (ref 0–6)
GFR SERPL CREATININE-BSD FRML MDRD: 39 ML/MIN/1.73
GFR SERPL CREATININE-BSD FRML MDRD: >60 ML/MIN/1.73
GLUCOSE BLD-MCNC: 117 MG/DL (ref 74–99)
GLUCOSE BLD-MCNC: 60 MG/DL (ref 74–99)
GLUCOSE BLD-MCNC: 73 MG/DL (ref 74–99)
GLUCOSE BLD-MCNC: 82 MG/DL (ref 74–99)
GLUCOSE BLD-MCNC: 93 MG/DL (ref 74–99)
GLUCOSE BLD-MCNC: 94 MG/DL (ref 74–99)
GLUCOSE URINE: NEGATIVE MG/DL
HCO3: 20.5 MMOL/L (ref 22–26)
HCT VFR BLD CALC: 33.2 % (ref 37–54)
HEMOGLOBIN: 10.4 G/DL (ref 12.5–16.5)
HHB: 6.6 % (ref 0–5)
HUMAN METAPNEUMOVIRUS BY PCR: NOT DETECTED
HUMAN RHINOVIRUS/ENTEROVIRUS BY PCR: NOT DETECTED
IMMATURE GRANULOCYTES #: 0.12 E9/L
IMMATURE GRANULOCYTES %: 1.1 % (ref 0–5)
INFLUENZA A BY PCR: NOT DETECTED
INFLUENZA B BY PCR: NOT DETECTED
INR BLD: 1.1
KETONES, URINE: NEGATIVE MG/DL
LAB: ABNORMAL
LACTIC ACID, SEPSIS: 1.2 MMOL/L (ref 0.5–1.9)
LACTIC ACID, SEPSIS: 2 MMOL/L (ref 0.5–1.9)
LEUKOCYTE ESTERASE, URINE: ABNORMAL
LYMPHOCYTES ABSOLUTE: 0.96 E9/L (ref 1.5–4)
LYMPHOCYTES RELATIVE PERCENT: 8.6 % (ref 20–42)
Lab: ABNORMAL
MAGNESIUM: 2.6 MG/DL (ref 1.6–2.6)
MCH RBC QN AUTO: 29.5 PG (ref 26–35)
MCHC RBC AUTO-ENTMCNC: 31.3 % (ref 32–34.5)
MCV RBC AUTO: 94.3 FL (ref 80–99.9)
METER GLUCOSE: 101 MG/DL (ref 74–99)
METER GLUCOSE: 66 MG/DL (ref 74–99)
METER GLUCOSE: 66 MG/DL (ref 74–99)
METER GLUCOSE: 73 MG/DL (ref 74–99)
METER GLUCOSE: 81 MG/DL (ref 74–99)
METER GLUCOSE: 84 MG/DL (ref 74–99)
METER GLUCOSE: 90 MG/DL (ref 74–99)
METER GLUCOSE: 91 MG/DL (ref 74–99)
METER GLUCOSE: 93 MG/DL (ref 74–99)
METER GLUCOSE: 95 MG/DL (ref 74–99)
METHB: 0.2 % (ref 0–1.5)
MODE: ABNORMAL
MONOCYTES ABSOLUTE: 0.63 E9/L (ref 0.1–0.95)
MONOCYTES RELATIVE PERCENT: 5.6 % (ref 2–12)
MYCOPLASMA PNEUMONIAE BY PCR: NOT DETECTED
NEUTROPHILS ABSOLUTE: 9.48 E9/L (ref 1.8–7.3)
NEUTROPHILS RELATIVE PERCENT: 84.6 % (ref 43–80)
NITRITE, URINE: NEGATIVE
O2 CONTENT: 16.7 ML/DL
O2 SATURATION: 93.4 % (ref 92–98.5)
O2HB: 92.9 % (ref 94–97)
OPERATOR ID: 467
ORGANISM: ABNORMAL
ORGANISM: ABNORMAL
PARAINFLUENZA VIRUS 1 BY PCR: NOT DETECTED
PARAINFLUENZA VIRUS 2 BY PCR: NOT DETECTED
PARAINFLUENZA VIRUS 3 BY PCR: NOT DETECTED
PARAINFLUENZA VIRUS 4 BY PCR: NOT DETECTED
PATIENT TEMP: 37 C
PCO2: 26.5 MMHG (ref 35–45)
PDW BLD-RTO: 14.6 FL (ref 11.5–15)
PH BLOOD GAS: 7.51 (ref 7.35–7.45)
PH UA: 7.5 (ref 5–9)
PLATELET # BLD: 422 E9/L (ref 130–450)
PMV BLD AUTO: 9.9 FL (ref 7–12)
PO2: 65.4 MMHG (ref 75–100)
POTASSIUM SERPL-SCNC: 3.3 MMOL/L (ref 3.5–5)
POTASSIUM SERPL-SCNC: 3.4 MMOL/L (ref 3.5–5)
POTASSIUM SERPL-SCNC: 3.4 MMOL/L (ref 3.5–5)
POTASSIUM SERPL-SCNC: 3.7 MMOL/L (ref 3.5–5)
POTASSIUM SERPL-SCNC: 3.7 MMOL/L (ref 3.5–5)
POTASSIUM SERPL-SCNC: 4.6 MMOL/L (ref 3.5–5)
PROCALCITONIN: 0.28 NG/ML (ref 0–0.08)
PROTEIN UA: 100 MG/DL
PROTHROMBIN TIME: 12 SEC (ref 9.3–12.4)
RBC # BLD: 3.52 E12/L (ref 3.8–5.8)
RBC UA: ABNORMAL /HPF (ref 0–2)
REASON FOR REJECTION: NORMAL
REJECTED TEST: NORMAL
RESPIRATORY SYNCYTIAL VIRUS BY PCR: NOT DETECTED
SARS-COV-2, NAAT: NOT DETECTED
SARS-COV-2, PCR: NOT DETECTED
SODIUM BLD-SCNC: 145 MMOL/L (ref 132–146)
SODIUM BLD-SCNC: 148 MMOL/L (ref 132–146)
SODIUM BLD-SCNC: 151 MMOL/L (ref 132–146)
SODIUM BLD-SCNC: 155 MMOL/L (ref 132–146)
SODIUM BLD-SCNC: 156 MMOL/L (ref 132–146)
SODIUM BLD-SCNC: 160 MMOL/L (ref 132–146)
SOURCE, BLOOD GAS: ABNORMAL
SPECIFIC GRAVITY UA: 1.01 (ref 1–1.03)
THB: 12.8 G/DL (ref 11.5–16.5)
TIME ANALYZED: 905
TOTAL PROTEIN: 7.6 G/DL (ref 6.4–8.3)
TROPONIN, HIGH SENSITIVITY: 47 NG/L (ref 0–11)
URINE CULTURE, ROUTINE: ABNORMAL
URINE CULTURE, ROUTINE: ABNORMAL
URINE CULTURE, ROUTINE: NORMAL
UROBILINOGEN, URINE: 1 E.U./DL
WBC # BLD: 11.2 E9/L (ref 4.5–11.5)
WBC UA: ABNORMAL /HPF (ref 0–5)

## 2023-01-01 PROCEDURE — 36415 COLL VENOUS BLD VENIPUNCTURE: CPT

## 2023-01-01 PROCEDURE — 0202U NFCT DS 22 TRGT SARS-COV-2: CPT

## 2023-01-01 PROCEDURE — 2580000003 HC RX 258: Performed by: INTERNAL MEDICINE

## 2023-01-01 PROCEDURE — 97165 OT EVAL LOW COMPLEX 30 MIN: CPT

## 2023-01-01 PROCEDURE — 92610 EVALUATE SWALLOWING FUNCTION: CPT | Performed by: SPEECH-LANGUAGE PATHOLOGIST

## 2023-01-01 PROCEDURE — 97530 THERAPEUTIC ACTIVITIES: CPT

## 2023-01-01 PROCEDURE — 6370000000 HC RX 637 (ALT 250 FOR IP): Performed by: INTERNAL MEDICINE

## 2023-01-01 PROCEDURE — 87088 URINE BACTERIA CULTURE: CPT

## 2023-01-01 PROCEDURE — 99285 EMERGENCY DEPT VISIT HI MDM: CPT

## 2023-01-01 PROCEDURE — 80048 BASIC METABOLIC PNL TOTAL CA: CPT

## 2023-01-01 PROCEDURE — 2580000003 HC RX 258: Performed by: EMERGENCY MEDICINE

## 2023-01-01 PROCEDURE — 2060000000 HC ICU INTERMEDIATE R&B

## 2023-01-01 PROCEDURE — 74230 X-RAY XM SWLNG FUNCJ C+: CPT

## 2023-01-01 PROCEDURE — 93010 ELECTROCARDIOGRAM REPORT: CPT | Performed by: INTERNAL MEDICINE

## 2023-01-01 PROCEDURE — 82962 GLUCOSE BLOOD TEST: CPT

## 2023-01-01 PROCEDURE — 85730 THROMBOPLASTIN TIME PARTIAL: CPT

## 2023-01-01 PROCEDURE — 2580000003 HC RX 258

## 2023-01-01 PROCEDURE — 87077 CULTURE AEROBIC IDENTIFY: CPT

## 2023-01-01 PROCEDURE — 6360000002 HC RX W HCPCS: Performed by: EMERGENCY MEDICINE

## 2023-01-01 PROCEDURE — 71045 X-RAY EXAM CHEST 1 VIEW: CPT

## 2023-01-01 PROCEDURE — 2500000003 HC RX 250 WO HCPCS: Performed by: RADIOLOGY

## 2023-01-01 PROCEDURE — 31500 INSERT EMERGENCY AIRWAY: CPT

## 2023-01-01 PROCEDURE — 6360000002 HC RX W HCPCS: Performed by: INTERNAL MEDICINE

## 2023-01-01 PROCEDURE — 96368 THER/DIAG CONCURRENT INF: CPT

## 2023-01-01 PROCEDURE — 81001 URINALYSIS AUTO W/SCOPE: CPT

## 2023-01-01 PROCEDURE — 99281 EMR DPT VST MAYX REQ PHY/QHP: CPT

## 2023-01-01 PROCEDURE — 83605 ASSAY OF LACTIC ACID: CPT

## 2023-01-01 PROCEDURE — 87635 SARS-COV-2 COVID-19 AMP PRB: CPT

## 2023-01-01 PROCEDURE — 87186 SC STD MICRODIL/AGAR DIL: CPT

## 2023-01-01 PROCEDURE — 92611 MOTION FLUOROSCOPY/SWALLOW: CPT | Performed by: SPEECH-LANGUAGE PATHOLOGIST

## 2023-01-01 PROCEDURE — 97535 SELF CARE MNGMENT TRAINING: CPT

## 2023-01-01 PROCEDURE — 84484 ASSAY OF TROPONIN QUANT: CPT

## 2023-01-01 PROCEDURE — 97161 PT EVAL LOW COMPLEX 20 MIN: CPT

## 2023-01-01 PROCEDURE — 85025 COMPLETE CBC W/AUTO DIFF WBC: CPT

## 2023-01-01 PROCEDURE — 6360000002 HC RX W HCPCS

## 2023-01-01 PROCEDURE — 80053 COMPREHEN METABOLIC PANEL: CPT

## 2023-01-01 PROCEDURE — 82533 TOTAL CORTISOL: CPT

## 2023-01-01 PROCEDURE — 70450 CT HEAD/BRAIN W/O DYE: CPT

## 2023-01-01 PROCEDURE — 96366 THER/PROPH/DIAG IV INF ADDON: CPT

## 2023-01-01 PROCEDURE — 92950 HEART/LUNG RESUSCITATION CPR: CPT

## 2023-01-01 PROCEDURE — 92526 ORAL FUNCTION THERAPY: CPT | Performed by: SPEECH-LANGUAGE PATHOLOGIST

## 2023-01-01 PROCEDURE — 96365 THER/PROPH/DIAG IV INF INIT: CPT

## 2023-01-01 PROCEDURE — 82805 BLOOD GASES W/O2 SATURATION: CPT

## 2023-01-01 PROCEDURE — 93005 ELECTROCARDIOGRAM TRACING: CPT | Performed by: EMERGENCY MEDICINE

## 2023-01-01 PROCEDURE — 96361 HYDRATE IV INFUSION ADD-ON: CPT

## 2023-01-01 PROCEDURE — 84145 PROCALCITONIN (PCT): CPT

## 2023-01-01 PROCEDURE — 85610 PROTHROMBIN TIME: CPT

## 2023-01-01 PROCEDURE — 87040 BLOOD CULTURE FOR BACTERIA: CPT

## 2023-01-01 PROCEDURE — 83735 ASSAY OF MAGNESIUM: CPT

## 2023-01-01 RX ORDER — POTASSIUM CHLORIDE 20 MEQ/1
40 TABLET, EXTENDED RELEASE ORAL DAILY
Status: DISCONTINUED | OUTPATIENT
Start: 2023-01-01 | End: 2023-01-01 | Stop reason: ALTCHOICE

## 2023-01-01 RX ORDER — DEXTROSE MONOHYDRATE 100 MG/ML
INJECTION, SOLUTION INTRAVENOUS CONTINUOUS PRN
Status: DISCONTINUED | OUTPATIENT
Start: 2023-01-01 | End: 2023-01-01 | Stop reason: HOSPADM

## 2023-01-01 RX ORDER — SODIUM CHLORIDE AND POTASSIUM CHLORIDE 150; 450 MG/100ML; MG/100ML
INJECTION, SOLUTION INTRAVENOUS CONTINUOUS
Status: DISCONTINUED | OUTPATIENT
Start: 2023-01-01 | End: 2023-01-01

## 2023-01-01 RX ORDER — POTASSIUM CHLORIDE 20 MEQ/1
40 TABLET, EXTENDED RELEASE ORAL DAILY
Status: ON HOLD | COMMUNITY
End: 2023-01-01

## 2023-01-01 RX ORDER — LISINOPRIL 20 MG/1
20 TABLET ORAL DAILY
Status: ON HOLD | COMMUNITY
End: 2023-01-01 | Stop reason: HOSPADM

## 2023-01-01 RX ORDER — PANTOPRAZOLE SODIUM 40 MG/1
40 TABLET, DELAYED RELEASE ORAL
Status: DISCONTINUED | OUTPATIENT
Start: 2023-01-01 | End: 2023-01-01 | Stop reason: HOSPADM

## 2023-01-01 RX ORDER — 0.9 % SODIUM CHLORIDE 0.9 %
500 INTRAVENOUS SOLUTION INTRAVENOUS ONCE
Status: COMPLETED | OUTPATIENT
Start: 2023-01-01 | End: 2023-01-01

## 2023-01-01 RX ORDER — 0.9 % SODIUM CHLORIDE 0.9 %
30 INTRAVENOUS SOLUTION INTRAVENOUS ONCE
Status: COMPLETED | OUTPATIENT
Start: 2023-01-01 | End: 2023-01-01

## 2023-01-01 RX ORDER — SODIUM CHLORIDE 450 MG/100ML
INJECTION, SOLUTION INTRAVENOUS CONTINUOUS
Status: DISCONTINUED | OUTPATIENT
Start: 2023-01-01 | End: 2023-01-01 | Stop reason: CLARIF

## 2023-01-01 RX ORDER — MAGNESIUM OXIDE 400 MG/1
400 TABLET ORAL DAILY
COMMUNITY

## 2023-01-01 RX ORDER — CEFDINIR 300 MG/1
300 CAPSULE ORAL EVERY 12 HOURS SCHEDULED
Qty: 8 CAPSULE | Refills: 0
Start: 2023-01-01 | End: 2023-01-19

## 2023-01-01 RX ORDER — ASPIRIN 81 MG/1
81 TABLET, CHEWABLE ORAL DAILY
Status: DISCONTINUED | OUTPATIENT
Start: 2023-01-01 | End: 2023-01-01 | Stop reason: HOSPADM

## 2023-01-01 RX ORDER — CEFDINIR 300 MG/1
300 CAPSULE ORAL 2 TIMES DAILY
Status: ON HOLD | COMMUNITY
End: 2023-01-01

## 2023-01-01 RX ORDER — LANOLIN ALCOHOL/MO/W.PET/CERES
400 CREAM (GRAM) TOPICAL DAILY
Status: DISCONTINUED | OUTPATIENT
Start: 2023-01-01 | End: 2023-01-01 | Stop reason: HOSPADM

## 2023-01-01 RX ORDER — POTASSIUM CHLORIDE 20 MEQ/1
20 TABLET, EXTENDED RELEASE ORAL ONCE
Status: COMPLETED | OUTPATIENT
Start: 2023-01-01 | End: 2023-01-01

## 2023-01-01 RX ORDER — CEFDINIR 300 MG/1
300 CAPSULE ORAL EVERY 12 HOURS SCHEDULED
Status: DISCONTINUED | OUTPATIENT
Start: 2023-01-01 | End: 2023-01-01 | Stop reason: HOSPADM

## 2023-01-01 RX ORDER — ACETAMINOPHEN 325 MG/1
650 TABLET ORAL EVERY 4 HOURS PRN
COMMUNITY

## 2023-01-01 RX ORDER — SODIUM CHLORIDE 9 MG/ML
INJECTION, SOLUTION INTRAVENOUS CONTINUOUS
Status: DISCONTINUED | OUTPATIENT
Start: 2023-01-01 | End: 2023-01-01

## 2023-01-01 RX ORDER — ASCORBIC ACID 500 MG
500 TABLET ORAL DAILY
COMMUNITY

## 2023-01-01 RX ORDER — AMLODIPINE BESYLATE 2.5 MG/1
2.5 TABLET ORAL DAILY
Qty: 30 TABLET | Refills: 5
Start: 2023-01-01

## 2023-01-01 RX ORDER — SUCRALFATE 1 G/1
1 TABLET ORAL 2 TIMES DAILY
Status: DISCONTINUED | OUTPATIENT
Start: 2023-01-01 | End: 2023-01-01 | Stop reason: HOSPADM

## 2023-01-01 RX ADMIN — SODIUM CHLORIDE 1824 ML: 9 INJECTION, SOLUTION INTRAVENOUS at 10:34

## 2023-01-01 RX ADMIN — POTASSIUM CHLORIDE 40 MEQ: 1500 TABLET, EXTENDED RELEASE ORAL at 12:27

## 2023-01-01 RX ADMIN — POTASSIUM CHLORIDE AND SODIUM CHLORIDE: 450; 150 INJECTION, SOLUTION INTRAVENOUS at 05:39

## 2023-01-01 RX ADMIN — PANTOPRAZOLE SODIUM 40 MG: 40 TABLET, DELAYED RELEASE ORAL at 05:40

## 2023-01-01 RX ADMIN — SODIUM CHLORIDE: 9 INJECTION, SOLUTION INTRAVENOUS at 13:08

## 2023-01-01 RX ADMIN — SODIUM CHLORIDE: 9 INJECTION, SOLUTION INTRAVENOUS at 02:02

## 2023-01-01 RX ADMIN — SODIUM CHLORIDE: 9 INJECTION, SOLUTION INTRAVENOUS at 12:48

## 2023-01-01 RX ADMIN — POTASSIUM BICARBONATE 40 MEQ: 782 TABLET, EFFERVESCENT ORAL at 09:25

## 2023-01-01 RX ADMIN — SUCRALFATE 1 G: 1 TABLET ORAL at 17:10

## 2023-01-01 RX ADMIN — SERTRALINE HYDROCHLORIDE 25 MG: 50 TABLET ORAL at 09:25

## 2023-01-01 RX ADMIN — POTASSIUM CHLORIDE AND SODIUM CHLORIDE: 450; 150 INJECTION, SOLUTION INTRAVENOUS at 11:35

## 2023-01-01 RX ADMIN — HYDROCORTISONE SODIUM SUCCINATE 100 MG: 100 INJECTION, POWDER, FOR SOLUTION INTRAMUSCULAR; INTRAVENOUS at 13:09

## 2023-01-01 RX ADMIN — AMPICILLIN SODIUM 2000 MG: 2 INJECTION, POWDER, FOR SOLUTION INTRAMUSCULAR; INTRAVENOUS at 13:09

## 2023-01-01 RX ADMIN — POTASSIUM CHLORIDE AND SODIUM CHLORIDE: 450; 150 INJECTION, SOLUTION INTRAVENOUS at 17:53

## 2023-01-01 RX ADMIN — SUCRALFATE 1 G: 1 TABLET ORAL at 08:32

## 2023-01-01 RX ADMIN — Medication 400 MG: at 09:24

## 2023-01-01 RX ADMIN — CEFEPIME 2000 MG: 2 INJECTION, POWDER, FOR SOLUTION INTRAVENOUS at 13:09

## 2023-01-01 RX ADMIN — PANTOPRAZOLE SODIUM 40 MG: 40 TABLET, DELAYED RELEASE ORAL at 05:03

## 2023-01-01 RX ADMIN — ASPIRIN 81 MG CHEWABLE TABLET 81 MG: 81 TABLET CHEWABLE at 12:27

## 2023-01-01 RX ADMIN — CEFEPIME 2000 MG: 2 INJECTION, POWDER, FOR SOLUTION INTRAVENOUS at 11:43

## 2023-01-01 RX ADMIN — SERTRALINE HYDROCHLORIDE 25 MG: 50 TABLET ORAL at 08:12

## 2023-01-01 RX ADMIN — ASPIRIN 81 MG CHEWABLE TABLET 81 MG: 81 TABLET CHEWABLE at 09:24

## 2023-01-01 RX ADMIN — SUCRALFATE 1 G: 1 TABLET ORAL at 17:53

## 2023-01-01 RX ADMIN — CEFEPIME 2000 MG: 2 INJECTION, POWDER, FOR SOLUTION INTRAVENOUS at 12:36

## 2023-01-01 RX ADMIN — PANTOPRAZOLE SODIUM 40 MG: 40 TABLET, DELAYED RELEASE ORAL at 12:27

## 2023-01-01 RX ADMIN — POTASSIUM CHLORIDE 20 MEQ: 20 TABLET, EXTENDED RELEASE ORAL at 14:03

## 2023-01-01 RX ADMIN — SUCRALFATE 1 G: 1 TABLET ORAL at 08:12

## 2023-01-01 RX ADMIN — Medication 400 MG: at 08:12

## 2023-01-01 RX ADMIN — POTASSIUM CHLORIDE AND SODIUM CHLORIDE: 450; 150 INJECTION, SOLUTION INTRAVENOUS at 00:36

## 2023-01-01 RX ADMIN — Medication 400 MG: at 12:27

## 2023-01-01 RX ADMIN — BARIUM SULFATE 15 ML: 400 PASTE ORAL at 14:07

## 2023-01-01 RX ADMIN — POTASSIUM CHLORIDE AND SODIUM CHLORIDE: 450; 150 INJECTION, SOLUTION INTRAVENOUS at 14:51

## 2023-01-01 RX ADMIN — CEFEPIME 2000 MG: 2 INJECTION, POWDER, FOR SOLUTION INTRAVENOUS at 12:25

## 2023-01-01 RX ADMIN — POTASSIUM BICARBONATE 40 MEQ: 782 TABLET, EFFERVESCENT ORAL at 08:12

## 2023-01-01 RX ADMIN — SODIUM CHLORIDE 500 ML: 9 INJECTION, SOLUTION INTRAVENOUS at 10:01

## 2023-01-01 RX ADMIN — CEFDINIR 300 MG: 300 CAPSULE ORAL at 21:18

## 2023-01-01 RX ADMIN — PANTOPRAZOLE SODIUM 40 MG: 40 TABLET, DELAYED RELEASE ORAL at 05:59

## 2023-01-01 RX ADMIN — DEXTROSE MONOHYDRATE 125 ML: 100 INJECTION, SOLUTION INTRAVENOUS at 09:05

## 2023-01-01 RX ADMIN — CEFEPIME 2000 MG: 2 INJECTION, POWDER, FOR SOLUTION INTRAVENOUS at 12:52

## 2023-01-01 RX ADMIN — ASPIRIN 81 MG CHEWABLE TABLET 81 MG: 81 TABLET CHEWABLE at 08:12

## 2023-01-01 RX ADMIN — SERTRALINE HYDROCHLORIDE 25 MG: 50 TABLET ORAL at 12:27

## 2023-01-01 RX ADMIN — CEFDINIR 300 MG: 300 CAPSULE ORAL at 09:24

## 2023-01-01 RX ADMIN — SODIUM CHLORIDE: 9 INJECTION, SOLUTION INTRAVENOUS at 17:10

## 2023-01-01 RX ADMIN — BARIUM SULFATE 15 ML: 400 SUSPENSION ORAL at 14:07

## 2023-01-01 RX ADMIN — SUCRALFATE 1 G: 1 TABLET ORAL at 12:27

## 2023-01-01 ASSESSMENT — PAIN SCALES - GENERAL
PAINLEVEL_OUTOF10: 0
PAINLEVEL_OUTOF10: 0

## 2023-01-04 LAB
ALBUMIN SERPL-MCNC: 2.9 G/DL (ref 3.5–5.2)
ALP BLD-CCNC: 119 U/L (ref 40–129)
ALT SERPL-CCNC: 31 U/L (ref 0–40)
ANION GAP SERPL CALCULATED.3IONS-SCNC: 14 MMOL/L (ref 7–16)
AST SERPL-CCNC: 28 U/L (ref 0–39)
BACTERIA: ABNORMAL /HPF
BILIRUB SERPL-MCNC: 0.8 MG/DL (ref 0–1.2)
BILIRUBIN URINE: NEGATIVE
BLOOD, URINE: ABNORMAL
BUN BLDV-MCNC: 23 MG/DL (ref 6–23)
CALCIUM SERPL-MCNC: 9.8 MG/DL (ref 8.6–10.2)
CHLORIDE BLD-SCNC: 107 MMOL/L (ref 98–107)
CLARITY: CLEAR
CO2: 19 MMOL/L (ref 22–29)
COLOR: YELLOW
CREAT SERPL-MCNC: 1 MG/DL (ref 0.7–1.2)
GFR SERPL CREATININE-BSD FRML MDRD: >60 ML/MIN/1.73
GLUCOSE BLD-MCNC: 73 MG/DL (ref 74–99)
GLUCOSE URINE: NEGATIVE MG/DL
HCT VFR BLD CALC: 35.4 % (ref 37–54)
HEMOGLOBIN: 11.3 G/DL (ref 12.5–16.5)
KETONES, URINE: NEGATIVE MG/DL
LEUKOCYTE ESTERASE, URINE: ABNORMAL
MCH RBC QN AUTO: 30 PG (ref 26–35)
MCHC RBC AUTO-ENTMCNC: 31.9 % (ref 32–34.5)
MCV RBC AUTO: 93.9 FL (ref 80–99.9)
NITRITE, URINE: NEGATIVE
PDW BLD-RTO: 14.4 FL (ref 11.5–15)
PH UA: 6 (ref 5–9)
PLATELET # BLD: 280 E9/L (ref 130–450)
PMV BLD AUTO: 9.7 FL (ref 7–12)
POTASSIUM SERPL-SCNC: 4.1 MMOL/L (ref 3.5–5)
PROTEIN UA: ABNORMAL MG/DL
RBC # BLD: 3.77 E12/L (ref 3.8–5.8)
RBC UA: >20 /HPF (ref 0–2)
SODIUM BLD-SCNC: 140 MMOL/L (ref 132–146)
SPECIFIC GRAVITY UA: 1.02 (ref 1–1.03)
TOTAL PROTEIN: 7.2 G/DL (ref 6.4–8.3)
UROBILINOGEN, URINE: 1 E.U./DL
WBC # BLD: 11.4 E9/L (ref 4.5–11.5)
WBC UA: ABNORMAL /HPF (ref 0–5)

## 2023-01-08 ENCOUNTER — APPOINTMENT (OUTPATIENT)
Dept: GENERAL RADIOLOGY | Age: 73
DRG: 871 | End: 2023-01-08
Payer: MEDICARE

## 2023-01-08 ENCOUNTER — HOSPITAL ENCOUNTER (EMERGENCY)
Age: 73
Discharge: HOME | DRG: 871 | End: 2023-01-09
Attending: STUDENT IN AN ORGANIZED HEALTH CARE EDUCATION/TRAINING PROGRAM
Payer: MEDICARE

## 2023-01-08 ENCOUNTER — APPOINTMENT (OUTPATIENT)
Dept: CT IMAGING | Age: 73
DRG: 871 | End: 2023-01-08
Payer: MEDICARE

## 2023-01-08 VITALS
RESPIRATION RATE: 19 BRPM | OXYGEN SATURATION: 98 % | TEMPERATURE: 97.8 F | HEIGHT: 66 IN | SYSTOLIC BLOOD PRESSURE: 101 MMHG | WEIGHT: 134 LBS | BODY MASS INDEX: 21.53 KG/M2 | HEART RATE: 87 BPM | DIASTOLIC BLOOD PRESSURE: 72 MMHG
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
## 2023-01-08 PROCEDURE — 70450 CT HEAD/BRAIN W/O DYE: CPT

## 2023-01-08 PROCEDURE — 72125 CT NECK SPINE W/O DYE: CPT | Performed by: RADIOLOGY

## 2023-01-08 PROCEDURE — 99284 EMERGENCY DEPT VISIT MOD MDM: CPT

## 2023-01-08 PROCEDURE — 72170 X-RAY EXAM OF PELVIS: CPT

## 2023-01-08 PROCEDURE — 71045 X-RAY EXAM CHEST 1 VIEW: CPT

## 2023-01-08 PROCEDURE — 70450 CT HEAD/BRAIN W/O DYE: CPT | Performed by: RADIOLOGY

## 2023-01-08 PROCEDURE — 72125 CT NECK SPINE W/O DYE: CPT

## 2023-01-09 NOTE — ED PROVIDER NOTES
Bryson Miller is a 74year old male with PMH of SAH, HLD, GERD, confusion who presented to ED with concern for mechanical fall. Patient was reported to have slipped out of wheelchair when he attempted to get up from chair, patient has recent fall and ICH, patient has been at NH in wheelchair since that time. Patient did not have syncope. Patient is reported to be at his baseline and does not contribute to HPI or ROS    The history is provided by the EMS personnel, the nursing home, medical records and the spouse. Review of Systems   Unable to perform ROS: Acuity of condition      Physical Exam  Vitals and nursing note reviewed. Constitutional:       General: He is not in acute distress. Appearance: He is ill-appearing. Comments: Chronically ill appearance    HENT:      Head: Normocephalic and atraumatic. Eyes:      General: No scleral icterus. Conjunctiva/sclera: Conjunctivae normal.      Pupils: Pupils are equal, round, and reactive to light. Cardiovascular:      Rate and Rhythm: Normal rate and regular rhythm. Pulmonary:      Effort: Pulmonary effort is normal.      Breath sounds: Normal breath sounds. Abdominal:      General: Bowel sounds are normal. There is no distension. Palpations: Abdomen is soft. Tenderness: There is no abdominal tenderness. There is no guarding or rebound. Musculoskeletal:      Cervical back: Normal range of motion and neck supple. No rigidity. No muscular tenderness. Right lower leg: No edema. Left lower leg: No edema. Skin:     General: Skin is warm and dry. Capillary Refill: Capillary refill takes less than 2 seconds. Coloration: Skin is not pale. Findings: No erythema or rash. Neurological:      Mental Status: He is alert. Mental status is at baseline.    Psychiatric:         Mood and Affect: Mood normal.        Procedures     MDM  Number of Diagnoses or Management Options  Fall, initial encounter  Diagnosis management comments: Aga Bauer is a 74year old male who presented to ED with concern for fall  Patient had a mechanical fall reported by EMS and NH  Patient was reported to be at his baseline mental status  History provided by EMS, NH, and wife  Patient has stable vital signs   CT and XR reviewed and interpreted unremarkable for acute process  Patient stable does not ambulate and will be discharged home with supportive care advised wife on indications to return to ED   Patient was not given medication while in ED    Differential diagnosis considered not limited to mechanical fall, syncope, ICH, fracture, ptx    Patient will be discharged back to facility               --------------------------------------------- PAST HISTORY ---------------------------------------------  Past Medical History:  has a past medical history of Diverticulitis, GERD (gastroesophageal reflux disease), High cholesterol, Hypertension, and Rosacea. Past Surgical History:  has a past surgical history that includes other surgical history (3/10/14); Colonoscopy (7/2/2012); Colonoscopy (08/04/2014); Endoscopy, colon, diagnostic (11/07/2016); and Upper gastrointestinal endoscopy (N/A, 6/10/2019). Social History:  reports that he quit smoking about 39 years ago. His smoking use included cigarettes. He has a 25.00 pack-year smoking history. He has never used smokeless tobacco. He reports that he does not currently use alcohol. He reports that he does not use drugs. Family History: family history includes Atrial Fibrillation in his brother; No Known Problems in his father and sister; Other in his mother. The patients home medications have been reviewed. Allergies: Patient has no known allergies. -------------------------------------------------- RESULTS -------------------------------------------------  Labs:  No results found for this visit on 01/08/23.     Radiology:  CT HEAD WO CONTRAST   Final Result   No acute intracranial abnormality. Chronic parenchymal change including atrophy and old white matter ischemia. Stable exam.         CT CERVICAL SPINE WO CONTRAST   Final Result   No acute abnormality of the cervical spine. Straightened alignment with minimal C4 retrolisthesis. Multilevel bony and discogenic degenerative changes. XR CHEST 1 VIEW   Final Result   No acute process. XR PELVIS (1-2 VIEWS)   Final Result   No acute abnormality of the pelvis.             ------------------------- NURSING NOTES AND VITALS REVIEWED ---------------------------  Date / Time Roomed:  1/8/2023  7:37 PM  ED Bed Assignment:  VELVET/VELVET    The nursing notes within the ED encounter and vital signs as below have been reviewed. /72   Pulse 87   Temp 97.8 °F (36.6 °C)   Resp 19   Ht 5' 6\" (1.676 m)   Wt 134 lb (60.8 kg)   SpO2 98%   BMI 21.63 kg/m²   Oxygen Saturation Interpretation: Normal      ------------------------------------------ PROGRESS NOTES ------------------------------------------  2:08 PM EST  I have spoken with the patient and discussed todays results, in addition to providing specific details for the plan of care and counseling regarding the diagnosis and prognosis. Their questions are answered at this time and they are agreeable with the plan. I discussed at length with them reasons for immediate return here for re evaluation. They will followup with their primary care physician by calling their office tomorrow. --------------------------------- ADDITIONAL PROVIDER NOTES ---------------------------------  At this time the patient is without objective evidence of an acute process requiring hospitalization or inpatient management. They have remained hemodynamically stable throughout their entire ED visit and are stable for discharge with outpatient follow-up.      The plan has been discussed in detail and they are aware of the specific conditions for emergent return, as well as the importance of follow-up. Discharge Medication List as of 1/9/2023  1:57 AM          Diagnosis:  1. Fall, initial encounter        Disposition:  Patient's disposition: Discharge to home  Patient's condition is stable.              Ashley Mendosa MD  01/09/23 2458

## 2023-01-09 NOTE — ED NOTES
Pt a&o to self only, pt resting in bed, VSS.  Awaiting further orders from [address] [place] [address]  01/08/23 1949

## 2023-01-09 NOTE — FLOWSHEET NOTE
Patient to the ED from [address] [place] [address], after a witnessed fall out of his wheelchair, per ems report patient is not on thinners and -LOC, +hit head.  Patient confused at baseline per ems report

## 2023-01-09 NOTE — ED NOTES
Per pt's wife, pt has been progressively/rapidly getting more confused within the last few weeks     Magalis Reed RN  01/08/23 2034

## 2023-01-10 PROBLEM — A41.9 SEPSIS (HCC): Status: ACTIVE | Noted: 2023-01-01

## 2023-01-10 NOTE — LETTER
[place] [employer]  CERTIFICATION OF NECESSITY  FOR NON-EMERGENCY TRANSPORTATION   BY [facility]      Individual Information   1. Name: Sean Hassan 2. [place] [employer] Billing Number: [id number]   7. Address: [place] [address]      Transportation Provider Information   4. Provider Name:    5. [place] [employer] Provider Number:  National Provider Identifier (NPI):      Certification  7. Criteria:  During transport, this individual requires:  [x] Medical treatment or continuous     supervision by an EMT. [] The administration or regulation of oxygen by another person. [] Supervised protective restraint. 8. Period Beginning Date: 01/ /2023   9. Length  [x] Not more than 1 day(s)  [] One Year     Additional Information Relevant to Certification   10. Comments or Explanations, If Necessary or Appropriate   Metabolic encephalopathy  , CONFUSED , AT RISK FOR FALL, ALERT TO SELF ONLY, DECLINED IN cognition, incontinent      Certifying Practitioner Information   11. Name of Practitioner: Ken Mcbride M.D.    12. [facility] Provider Number, If Applicable:  [id number] Provider Identifier (NPI):     Signature Information   14. Date of Signature: 01/ /2023 15. Name of Person Signing: Toy Re    12.  Signature and Professional Designation:       [facility] [id number]  Rev. 7/2015

## 2023-01-10 NOTE — ED PROVIDER NOTES
[address]        Pt Name: Dacia Fang  MRN: [id number]  [place] 1950  Date of evaluation: 1/10/2023  Provider: Elian Horvath DO  PCP: Mayte Arnett DO  Note Started: 8:57 AM EST 1/10/23    CHIEF COMPLAINT       Chief Complaint   Patient presents with    Altered Mental Status     From [facility]; here today because patient is declining physically and mentally-will not talk anymore, will not stand for PT; was here recently for fall, recent brain bleed per EMS       HISTORY OF PRESENT ILLNESS: 1 or more Elements   History From: EMS, nursing facility records, previous discharge summary note    Limitations to history : Altered Mental Status    Dacia Fang is a 74 y.o. male who presents via EMS from nursing facility. They report for the last few days he has had declining mental status, was awake per records last night but drowsy, today minimally responsive. Normally is able to ambulate for PT but state he is unable to do so for the last few days. Nursing Notes were all reviewed and agreed with or any disagreements were addressed in the HPI. REVIEW OF EXTERNAL NOTE :       Inpatient note from Dr. Cinthya Kang 12/23/22 - Was awake and alert at that time, had magnesium and potassium replicated at that time    REVIEW OF SYSTEMS :           Positives and Pertinent negatives as per HPI.       SURGICAL HISTORY     Past Surgical History:   Procedure Laterality Date    COLONOSCOPY  7/2/2012    polyps removed    COLONOSCOPY  08/04/2014    ENDOSCOPY, COLON, DIAGNOSTIC  11/07/2016    OTHER SURGICAL HISTORY  3/10/14    upper GI    UPPER GASTROINTESTINAL ENDOSCOPY N/A 6/10/2019    EGD BIOPSY performed by Rafia Blackwell MD at [address]       Previous Medications    ASPIRIN (ASPIRIN LOW DOSE) 81 MG CHEWABLE TABLET    Take 1 tablet by mouth daily    LEVETIRACETAM (KEPPRA) 500 MG TABLET    Take 1 tablet by mouth in the morning and 1 tablet in the evening. Do all this for 8 doses. LISINOPRIL (PRINIVIL;ZESTRIL) 20 MG TABLET    Take 1 tablet by mouth daily    MAGNESIUM OXIDE (MAG-OX) 400 MG TABLET    Take 1 tablet by mouth daily    PANTOPRAZOLE (PROTONIX) 40 MG TABLET    Take 1 tablet by mouth every morning (before breakfast)    POTASSIUM CHLORIDE (KLOR-CON M) 20 MEQ EXTENDED RELEASE TABLET    Take 2 tablets by mouth daily    SERTRALINE (ZOLOFT) 25 MG TABLET    Take 1 tablet by mouth daily    SUCRALFATE (CARAFATE) 1 GM TABLET    Take 1 tablet by mouth in the morning and 1 tablet in the evening. ALLERGIES     Patient has no known allergies. FAMILYHISTORY       Family History   Problem Relation Age of Onset    Other Mother         bowel obstruction    No Known Problems Father     No Known Problems Sister     Atrial Fibrillation Brother         SOCIAL HISTORY       Social History     Tobacco Use    Smoking status: Former     Packs/day: 1.00     Years: 25.00     Pack years: 25.00     Types: Cigarettes     Quit date:      Years since quittin.0    Smokeless tobacco: Never   Vaping Use    Vaping Use: Never used   Substance Use Topics    Alcohol use: Not Currently     Comment: approx 2 beers per day    Drug use: No       SCREENINGS        Traci Coma Scale  Eye Opening: To pain  Best Verbal Response: None  Best Motor Response:  Withdraws from pain  Traci Coma Scale Score: 7                CIWA Assessment  BP: (!) 140/79  Heart Rate: 82           PHYSICAL EXAM  1 or more Elements     ED Triage Vitals [01/10/23 0841]   BP Temp Temp src Heart Rate Resp SpO2 Height Weight   95/63 -- -- (!) 110 30 90 % -- --               Constitutional/General: Somnolent, arouses to painful stimuli, mumbles but unable to answer orientation questions  Head: Normocephalic and atraumatic  Eyes: PERRL, EOMI, conjunctiva normal, sclera non icteric  ENT:  Oropharynx clear, handling secretions, no trismus, no asymmetry of the posterior oropharynx or uvular edema dry mucous membranes  Neck: Supple, full ROM, no stridor, no meningeal signs  Respiratory: Lungs diminished throughout not in respiratory distress  Cardiovascular: Tachycardic and regular 2+ distal pulses. Equal extremity pulses. Chest: No chest wall tenderness  GI:  Abdomen Soft, Non tender, Non distended. +BS. No rebound, guarding, or rigidity. No pulsatile masses. Boucher catheter in place  Musculoskeletal: Moves all extremities x 4. No obvious long bone deformities capillary refill <3 seconds  Integument: skin warm and dry. No rashes.    Neurologic: Glascow Coma Scale  Best Eye Response 2 - Opens eyes with pain   Best Verbal Response 2 - Moans, makes unintelligible sounds   Best Motor Response 4 - Moves part of body but does not remove noxious stimulus   Total 8                 DIAGNOSTIC RESULTS   LABS:    Labs Reviewed   LACTATE, SEPSIS - Abnormal; Notable for the following components:       Result Value    Lactic Acid, Sepsis 2.0 (*)     All other components within normal limits   TROPONIN - Abnormal; Notable for the following components:    Troponin, High Sensitivity 47 (*)     All other components within normal limits   CBC WITH AUTO DIFFERENTIAL - Abnormal; Notable for the following components:    RBC 3.52 (*)     Hemoglobin 10.4 (*)     Hematocrit 33.2 (*)     MCHC 31.3 (*)     Neutrophils % 84.6 (*)     Lymphocytes % 8.6 (*)     Neutrophils Absolute 9.48 (*)     Lymphocytes Absolute 0.96 (*)     Eosinophils Absolute 0.00 (*)     All other components within normal limits   COMPREHENSIVE METABOLIC PANEL - Abnormal; Notable for the following components:    Sodium 151 (*)     Chloride 115 (*)     CO2 21 (*)     Glucose 117 (*)     BUN 47 (*)     Creatinine 1.8 (*)     Albumin 3.0 (*)     Alkaline Phosphatase 166 (*)     ALT 99 (*)      (*)     All other components within normal limits   URINALYSIS - Abnormal; Notable for the following components:    Blood, Urine MODERATE (*)     Protein,  (*)     Leukocyte Esterase, Urine LARGE (*)     All other components within normal limits   PROCALCITONIN - Abnormal; Notable for the following components:    Procalcitonin 0.28 (*)     All other components within normal limits   BLOOD GAS, ARTERIAL - Abnormal; Notable for the following components:    pH, Blood Gas 7.507 (*)     PCO2 26.5 (*)     PO2 65.4 (*)     HCO3 20.5 (*)     O2Hb 92.9 (*)     HHb 6.6 (*)     All other components within normal limits   CORTISOL TOTAL - Abnormal; Notable for the following components:    Cortisol 42.34 (*)     All other components within normal limits   MICROSCOPIC URINALYSIS - Abnormal; Notable for the following components:    WBC, UA 10-20 (*)     RBC, UA 10-20 (*)     Bacteria, UA MANY (*)     Crystals, UA Few (*)     All other components within normal limits   RESPIRATORY PANEL, MOLECULAR, WITH COVID-19   CULTURE, BLOOD 1   CULTURE, BLOOD 2   CULTURE, URINE   LACTATE, SEPSIS   PROTIME-INR   APTT   MAGNESIUM   ARTERIAL BLOOD GAS, RESPIRATORY ONLY       As interpreted by me, the above displayed labs are abnormal. All other labs obtained during this visit were within normal range or not returned as of this dictation. EKG Interpretation  Interpreted by emergency department physician, Chadd Schulz, DO      EKG @ 0772: This EKG is signed and interpreted by Dr Leanne De Leon.     Rate: 101  Rhythm: Sinus  Interpretation: Sinus rhythm with sinus tachycardia, CA is 122, QRS is 82, QTc is 490, nonspecific T changes, when compared to 12/27/2022 QTC has shortened when previously 520  Comparison: changes compared to previous EKG          RADIOLOGY:   Non-plain film images such as CT, Ultrasound and MRI are read by the radiologist. Plain radiographic images are visualized and preliminarily interpreted by the ED Provider with the below findings:    No acute process, no Ptx     Interpretation per the Radiologist below, if available at the time of this note:    CT HEAD WO CONTRAST   Final Result   1. No evidence of acute intracranial abnormality. 2. Stable cortical calcification in the right temporal lobe, likely related   to recent subarachnoid hemorrhage as discussed above. 3. Chronic white matter ischemic changes. Mild atrophy. XR CHEST PORTABLE   Final Result   1. There is no acute cardiopulmonary disease           XR PELVIS (1-2 VIEWS)    Result Date: 1/8/2023  EXAMINATION: ONE XRAY VIEW OF THE PELVIS 1/8/2023 6:27 pm COMPARISON: None. HISTORY: ORDERING SYSTEM PROVIDED HISTORY: fall TECHNOLOGIST PROVIDED HISTORY: Reason for exam:->fall What reading provider will be dictating this exam?->CRC FINDINGS: No evidence of pelvic fracture. Bilateral hips demonstrate normal alignment. No focal osseous lesion. SI joints are symmetric. No acute abnormality of the pelvis. CT HEAD WO CONTRAST    Result Date: 1/8/2023  EXAMINATION: CT OF THE HEAD WITHOUT CONTRAST  1/8/2023 8:01 pm TECHNIQUE: CT of the head was performed without the administration of intravenous contrast. Automated exposure control, iterative reconstruction, and/or weight based adjustment of the mA/kV was utilized to reduce the radiation dose to as low as reasonably achievable. COMPARISON: CT head, 12/27/2022; CT head, 12/20/2022 HISTORY: ORDERING SYSTEM PROVIDED HISTORY: fall TECHNOLOGIST PROVIDED HISTORY: Has a \"code stroke\" or \"stroke alert\" been called? ->No Reason for exam:->fall Decision Support Exception - unselect if not a suspected or confirmed emergency medical condition->Emergency Medical Condition (MA) What reading provider will be dictating this exam?->CRC FINDINGS: BRAIN/VENTRICLES: There is no significant oval change. There is cerebral and cerebellar atrophy with associated ex vacuo dilatation of the ventricular system.   There is mild-moderate ill-defined low-attenuation in the periventricular white matter, corona radiata, and centrum semiovale bilaterally which has association with age related microvascular white matter ischemic disease. There is no acute intracranial hemorrhage, mass effect or midline shift. No abnormal extra-axial fluid collection. The gray-white differentiation is maintained without evidence of an acute infarct. There is no evidence of hydrocephalus. ORBITS: The visualized portion of the orbits demonstrate no acute abnormality. SINUSES: The visualized paranasal sinuses and mastoid air cells demonstrate no acute abnormality. SOFT TISSUES/SKULL:  No acute abnormality of the visualized skull or soft tissues. No acute intracranial abnormality. Chronic parenchymal change including atrophy and old white matter ischemia. Stable exam.     CT CERVICAL SPINE WO CONTRAST    Result Date: 1/8/2023  EXAMINATION: CT OF THE CERVICAL SPINE WITHOUT CONTRAST 1/8/2023 8:01 pm TECHNIQUE: CT of the cervical spine was performed without the administration of intravenous contrast. Multiplanar reformatted images are provided for review. Automated exposure control, iterative reconstruction, and/or weight based adjustment of the mA/kV was utilized to reduce the radiation dose to as low as reasonably achievable. COMPARISON: CT cervical spine, 12/19/2022 HISTORY: ORDERING SYSTEM PROVIDED HISTORY: fall TECHNOLOGIST PROVIDED HISTORY: Reason for exam:->fall Decision Support Exception - unselect if not a suspected or confirmed emergency medical condition->Emergency Medical Condition (MA) What reading provider will be dictating this exam?->CRC FINDINGS: BONES/ALIGNMENT: There is no acute fracture or traumatic malalignment. The cervical alignment is straightened with minimal C4, C5 retrolisthesis. The bones are osteopenic. DEGENERATIVE CHANGES: There is atlantodental articulation is intact with associated degenerative change. There is mild-moderate cervical spondylosis and disc height loss from C2-3 to C6-7.  SOFT TISSUES: There is no prevertebral soft tissue swelling. No acute abnormality of the cervical spine. Straightened alignment with minimal C4 retrolisthesis. Multilevel bony and discogenic degenerative changes. XR CHEST 1 VIEW    Result Date: 1/8/2023  EXAMINATION: ONE XRAY VIEW OF THE CHEST 1/8/2023 6:27 pm COMPARISON: 12/27/2022 HISTORY: ORDERING SYSTEM PROVIDED HISTORY: fall TECHNOLOGIST PROVIDED HISTORY: Reason for exam:->fall What reading provider will be dictating this exam?->CRC FINDINGS: The lungs are without acute focal process. There is no effusion or pneumothorax. The cardiomediastinal silhouette is without acute process. The osseous structures are without acute process. No acute process. No results found. PROCEDURES   Unless otherwise noted below, none          CRITICAL CARE TIME (.cct)        PAST MEDICAL HISTORY/Chronic Conditions Affecting Care      has a past medical history of Diverticulitis, GERD (gastroesophageal reflux disease), High cholesterol, Hypertension, and Rosacea. EMERGENCY DEPARTMENT COURSE    Vitals:    Vitals:    01/10/23 1130 01/10/23 1200 01/10/23 1230 01/10/23 1248   BP: 125/78 127/80 (!) 140/79    Pulse:  80 82    Resp:  28 (!) 32    Temp:    97.9 °F (36.6 °C)   SpO2:  98%         Patient was given the following medications:  Medications   0.9 % sodium chloride bolus (0 mLs IntraVENous Stopped 1/10/23 1034)   0.9 % sodium chloride bolus (0 mLs IntraVENous Stopped 1/10/23 1135)   hydrocortisone sodium succinate PF (SOLU-CORTEF) injection 100 mg (100 mg IntraVENous Given 1/10/23 1309)   cefepime (MAXIPIME) 2,000 mg in sodium chloride 0.9 % 50 mL IVPB ([id number]) (2,000 mg IntraVENous New Bag 1/10/23 1309)   ampicillin (OMNIPEN) 2,000 mg in sodium chloride 0.9 % 100 mL IVPB (mini-bag) (2,000 mg IntraVENous New Bag 1/10/23 1309)           Is this patient to be included in the SEP-1 Core Measure due to severe sepsis or septic shock?    Yes   SEP-1 CORE MEASURE DATA      Sepsis Criteria   Severe Sepsis Criteria   Septic Shock Criteria     Must be confirmed or suspected to move forward with diagnosis of sepsis. Must meet 2:    [] Temperature > 100.9 F (38.3 C)        or < 96.8 F (36 C)  [x] HR > 90  [x] RR > 20  [x] WBC > 12 or < 4 or 10% bands      AND:      [x] Infection Confirmed or        Suspected. Must meet 1:    [x] Lactate > 2       or   [] Signs of Organ Dysfunction:    - SBP < 90 or MAP < 65  - Altered mental status  - Creatinine > 2 or increased from      baseline  - Urine Output < 0.5 ml/kg/hr  - Bilirubin > 2  - INR > 1.5 (not anticoagulated)  - Platelets < 340,625  - Acute Respiratory Failure as     evidenced by new need for NIPPV     or mechanical ventilation      [] No criteria met for Severe Sepsis. Must meet 1:    [] Lactate > 4        or   [] SBP < 90 or MAP < 65 for at        least two readings in the first        hour after fluid bolus        administration      [] Vasopressors initiated (if hypotension persists after fluid resuscitation)        [] No criteria met for Septic Shock.    Patient Vitals for the past 6 hrs:   BP Temp Pulse Resp SpO2   01/10/23 0841 95/63 -- (!) 110 30 90 %   01/10/23 0843 -- -- -- -- 97 %   01/10/23 0915 (!) 93/59 -- 98 (!) 31 98 %   01/10/23 0930 87/62 -- 95 19 99 %   01/10/23 0945 -- -- 93 (!) 33 98 %   01/10/23 1000 102/71 -- 88 29 99 %   01/10/23 1015 -- -- 87 (!) 35 98 %   01/10/23 1030 116/74 -- 88 27 98 %   01/10/23 1045 -- -- 82 (!) 35 100 %   01/10/23 1100 121/68 -- 80 (!) 31 98 %   01/10/23 1115 -- -- 79 (!) 33 --   01/10/23 1130 125/78 -- -- -- --   01/10/23 1200 127/80 -- 80 28 98 %   01/10/23 1230 (!) 140/79 -- 82 (!) 32 --   01/10/23 1248 -- 97.9 °F (36.6 °C) -- -- --      Recent Labs     01/10/23  0914 01/10/23  1145   WBC 11.2  --    CREATININE 1.8*  --    BILITOT 0.9  --    INR  --  1.1     --          Time Severe Sepsis Identified: [id number]    Fluid Resuscitation Rational: at least 30mL/kg based on entered actual weight at time of triage    Repeat lactate level: ordered and pending at this time    Reassessment Exam:   I have reassessed tissue perfusion and hemodynamic status after fluid bolus at this time:          Medical Decision Making/Differential Diagnosis:    CC/HPI Summary, Social Determinants of health, Records Reviewed, DDx, testing done/not done, ED Course, Reassessment, disposition considerations/shared decision making with patient, consults, disposition:            Medical Decision Making  Problems Addressed:  Metabolic encephalopathy: acute illness or injury  Sepsis with acute renal failure, due to unspecified organism, unspecified acute renal failure type, unspecified whether septic shock present [facility]): acute illness or injury    Amount and/or Complexity of Data Reviewed  Labs: ordered. Details: UA shows pyuria with large leukocytes, white count of 11 with left shift. Initial lactic acid elevated, given IVF and IV ABX  Radiology: ordered. ECG/medicine tests: ordered. Risk  Prescription drug management. Decision regarding hospitalization. Critical Care  Total time providing critical care: (Please note that the withdrawal or failure to initiate urgent interventions for this patient would likely result in a life threatening deterioration or permanent disability. Accordingly this patient received 42 minutes of critical care time, excluding separately billable procedures.  )      Patient presents hypotensive altered upon arrival.  White count of 11 with left shift, urinalysis shows pyuria, review of previous urine cultures, after consultation with pharmacy IV cefepime and IV ampicillin was ordered. Cultures were obtained prior to IV antibiotics. Blood pressure improved with IV hydration, CT head was ordered to evaluate for acute intracranial bleed as patient does have history of subarachnoid hemorrhage, chronic findings without acute process.   Spoke with medicine patient kept for further care    CONSULTS: (Who and What was discussed)     Spoke with Dr. Ronal Alexis (Medicine). Discussed case. They will admit this patient. I am the Primary Clinician of Record. FINAL IMPRESSION      1. Sepsis with acute renal failure, due to unspecified organism, unspecified acute renal failure type, unspecified whether septic shock present ([facility] [address]    2. Complicated UTI (urinary tract infection)    3. Metabolic encephalopathy    4.  Nursing home resident          DISPOSITION/PLAN     DISPOSITION Admitted 01/10/2023 01:48:42 PM       (Please note that portions of this note were completed with a voice recognition program.  Efforts were made to edit the dictations but occasionally words are mis-transcribed.)    Rosalio Avery DO (electronically signed)            Rosalio Avery DO  01/10/23 1126

## 2023-01-11 NOTE — CARE COORDINATION
SOFI transition of care. Pt presented to [facility] ER from [address] [place] [address] SNF secondary to AMS. Recently d/c from this facility on 12/23. Admitted with sepsis. Cefepime ordered. Spoke with Judy Abernathy from [address] [place] [address]. Pt was there skilled. Not a bed hold. Can return pending bed availability. They have had conversation with pt wife that he needs to be long term care. They do not have long term care beds so they advised her to start looking at different facilities. Spoke with pt wife Leticia Perez via phone. She reports that he needs to have 24 hour care. She is unable to provide that. She stated that she appealed his d/c from [address] [place] [address] and will know the results probably tomorrow. She prefers he returns to [facility] (due to it being closer to her home), but if not she would like [facility]. Referral called to [facility] with [facility]. CM/SW to follow. Kerry Velasco RN CM. The Plan for Transition of Care is related to the following treatment goals: PLOF    The Patient and/or patient representative Pt duane Perez was provided with a choice of provider and agrees   with the discharge plan. [x] Yes [] No    Freedom of choice list was provided with basic dialogue that supports the patient's individualized plan of care/goals, treatment preferences and shares the quality data associated with the providers.  [x] Yes [] No

## 2023-01-11 NOTE — H&P
[facility] Internal Medicine  History and Physical      CHIEF COMPLAINT: Altered mental status    Reason for Admission: GAYE, hyponatremia, possible UTI    History Obtained From: Patient, electronic medical record    PCP :  Tza Fung DO    [address] [address]      HISTORY OF PRESENT ILLNESS:      The patient is a 74 y.o. male presents from a local nursing home because of altered mental status and not participating with therapy. Patient recently had a fall with subarachnoid bleeding. In the emergency room there is a question of UTI. He was also noted to be hyponatremic with acute kidney injury. He was then admitted for further evaluation and treatment. Patient remains in the emergency room. He remains confused. Past Medical History:        Diagnosis Date    Diverticulitis     GERD (gastroesophageal reflux disease)     High cholesterol     Hypertension     Rosacea      Past Surgical History:        Procedure Laterality Date    COLONOSCOPY  2012    polyps removed    COLONOSCOPY  2014    ENDOSCOPY, COLON, DIAGNOSTIC  2016    OTHER SURGICAL HISTORY  3/10/14    upper GI    UPPER GASTROINTESTINAL ENDOSCOPY N/A 6/10/2019    EGD BIOPSY performed by Venida Eisenmenger, MD at [facility] ENDOSCOPY         Medications Prior to Admission:    Not in a hospital admission. Allergies:  Patient has no known allergies.     Social History:   Social History     Socioeconomic History    Marital status:      Spouse name: Not on file    Number of children: 1    Years of education: Not on file    Highest education level: Not on file   Occupational History    Occupation: retired     Comment: [id number] [employer]   Tobacco Use    Smoking status: Former     Packs/day: 1.00     Years: 25.00     Pack years: 25.00     Types: Cigarettes     Quit date:      Years since quittin.0    Smokeless tobacco: Never   Vaping Use    Vaping Use: Never used   Substance and Sexual Activity    Alcohol use: Not Currently     Comment: approx 2 beers per day    Drug use: No    Sexual activity: Not on file   Other Topics Concern    Not on file   Social History Narrative    Not on file     Social Determinants of Health     Financial Resource Strain: Low Risk     Difficulty of Paying Living Expenses: Not hard at all   Food Insecurity: No Food Insecurity    Worried About Running Out of Food in the Last Year: Never true    Ran Out of Food in the Last Year: Never true   Transportation Needs: Not on file   Physical Activity: Inactive    Days of Exercise per Week: 0 days    Minutes of Exercise per Session: 0 min   Stress: Not on file   Social Connections: Not on file   Intimate Partner Violence: Not on file   Housing Stability: Not on file         Family History:       Problem Relation Age of Onset    Other Mother         bowel obstruction    No Known Problems Father     No Known Problems Sister     Atrial Fibrillation Brother        REVIEW OF SYSTEMS:    Patient extremely confused    Vitals:  BP (!) 146/91   Pulse 92   Temp 97.7 °F (36.5 °C) (Oral)   Resp 16   SpO2 94%     PHYSICAL EXAM:  General:  Awake, confused without agitation or distress    HEENT:  Normocephalic, atraumatic. Pupils equal, round, reactive to light. No scleral icterus. No conjunctival injection. Neck:  Supple, no carotid bruits  Heart:  RRR,   Lungs:  CTA bilaterally, bilat symmetrical expansion, no wheeze, rales, or rhonchi  Abdomen: Bowel sounds present, soft, nontender, no masses, no organomegaly, no peritoneal signs  Extremities:  No clubbing, cyanosis, or edema  Skin:  Warm and dry, no open lesions or rash  Neuro:  Cranial nerves 2-12 intact, no focal deficits      DATA:     No results found for this or any previous visit (from the past 24 hour(s)). CT HEAD WO CONTRAST   Final Result   1. No evidence of acute intracranial abnormality.    2. Stable cortical calcification in the right temporal lobe, likely related   to recent subarachnoid hemorrhage as discussed above. 3. Chronic white matter ischemic changes. Mild atrophy. XR CHEST PORTABLE   Final Result   1. There is no acute cardiopulmonary disease                 ASSESSMENT :      Principal Problem:    Sepsis ([address]  Resolved Problems:    * No resolved hospital problems. *    Hypernatremia  UTI  Acute kidney injury  Metabolic encephalopathy  Recent subarachnoid hemorrhage  Hypertension  GERD  Underlying history of depression    Plan :    Discussed with charge nurse no IV fluids infusing  Await labs no blood work has been done today despite being ordered  Monitor renal function  Await urine culture  Empiric antibiotics  Subacute rehab on discharge  CT of the head with no acute changes    Electronically signed by Urbano Arceo MD on 1/11/2023 at 1:44 PM    NOTE: This report was transcribed using voice recognition software.  Every effort was made to ensure accuracy; however, inadvertent transcription errors may be present

## 2023-01-11 NOTE — PROGRESS NOTES
This patient is on medication that requires renal, weight, and/or indication dose adjustment. Date Body Weight IBW  Adjusted BW SCr  CrCl Dialysis status   1/11/2023   Ideal body weight: 63.8 kg (140 lb 10.5 oz) Serum creatinine: 1.8 mg/dL (H) 01/10/23 0914  Estimated creatinine clearance: 30 mL/min (A) N/a       Pharmacy has dose-adjusted the following medication(s):    Date Previous Order Adjusted Order   1/11/2023 Cefepime 2,00mg IV q12h Cefepime 2,000mg IV q24h       These changes were made per protocol according to the [address] for Pharmacists. *Please note this dose may need readjusted if patient's condition changes. Please contact pharmacy with any questions regarding these changes.     Jessica Davis, PharmD, [facility], BCPS 1/11/2023 1:36 AM

## 2023-01-12 NOTE — PROGRESS NOTES
Patient swallowing small sips of water with no difficulty but pocketing solid food and not swallowing. Oral care provided.

## 2023-01-12 NOTE — DISCHARGE INSTR - COC
Continuity of Care Form    Patient Name: Michelle Logan   :  1950  MRN:  [id number]    Admit date:  1/10/2023  Discharge date:  ***    Code Status Order: Full Code   Advance Directives:     Admitting Physician:  Esvin Johns MD  PCP: Chance Gonzales DO    Discharging Nurse: [facility] Unit/Room#: 2933/7391-V  Discharging Unit Phone Number: ***    Emergency Contact:   Extended Emergency Contact Information  Primary Emergency Contact: Stacy Weber  Address: [address] Phone: [phone]  Relation: Spouse  Secondary Emergency Contact: Gurinder Weber III  Address: [address] [facility] [place], [place] [address] [place] [address] Phone: [phone]  Relation: Child    Past Surgical History:  Past Surgical History:   Procedure Laterality Date    COLONOSCOPY  2012    polyps removed    COLONOSCOPY  2014    ENDOSCOPY, COLON, DIAGNOSTIC  2016    OTHER SURGICAL HISTORY  3/10/14    upper GI    UPPER GASTROINTESTINAL ENDOSCOPY N/A 6/10/2019    EGD BIOPSY performed by Emeterio Blake MD at [facility] History:   Immunization History   Administered Date(s) Administered    COVID-19, MODERNA BLUE border, Primary or Immunocompromised, (age 12y+), IM, 100 mcg/0.5mL 2021, 2021, 2022    COVID-19, MODERNA Bivalent BOOSTER, (age 12y+), IM, 48 mcg/0.5 mL 2022    Influenza, FLUAD, (age 72 y+), Adjuvanted, 0.5mL 2020, 10/10/2021    Influenza, FLUARIX, FLULAVAL, FLUZONE (age 10 mo+) AND AFLURIA, (age 1 y+), PF, 0.5mL 10/11/2018, 10/11/2018, 10/02/2019    Influenza, FLUZONE (age 72 y+), High Dose, 0.7mL 10/03/2022    Influenza, High Dose (Fluzone 65 yrs and older) 10/17/2015, 10/17/2015, 10/15/2016, 10/15/2016    Influenza, Triv, inactivated, subunit, adjuvanted, IM (Fluad 65 yrs and older) 10/28/2017, 10/28/2017    Pneumococcal Conjugate 13-valent (Rogena Peabody) 01/12/2016, 01/12/2016    Pneumococcal Polysaccharide ([id number]) 07/12/2017       Active Problems:  Patient Active Problem List   Diagnosis Code    BMI 26.0-26.9,adult Z68.26    Benign essential hypertension I10    GERD (gastroesophageal reflux disease) K21.9    Rosacea L71.9    Hyperlipidemia E78.5    Current moderate episode of major depressive disorder without prior episode ([facility] F32.1    Insomnia G47.00    Ataxia R27.0    Fall W19. XXXA    Traumatic subarachnoid hemorrhage with unknown loss of consciousness status S06. 6XAA    Facial contusion S00.83XA    Nail avulsion of toe S91.209A    SAH (subarachnoid hemorrhage) ([facility]) I60.9    Sepsis ([facility] A41.9       Isolation/Infection:   Isolation            No Isolation          Patient Infection Status       Infection Onset Added Last Indicated Last Indicated By Review Planned Expiration Resolved Resolved By    None active    Resolved    COVID-19 (Rule Out) 01/10/23 01/10/23 01/10/23 Respiratory Panel, Molecular, with COVID-19 (Restricted: peds pts or suitable admitted adults) (Ordered)   01/10/23 Rule-Out Test Resulted    COVID-19 (Rule Out) 12/04/22 12/04/22 12/04/22 COVID-19, Rapid (Ordered)   12/04/22 Rule-Out Test Resulted            Nurse Assessment:  Last Vital Signs: BP (!) 158/85   Pulse 76   Temp 97.7 °F (36.5 °C)   Resp 16   Ht 5' 6\" (1.676 m)   Wt 130 lb (59 kg)   SpO2 98%   BMI 20.98 kg/m²     Last documented pain score (0-10 scale): Pain Level: 0  Last Weight:   Wt Readings from Last 1 Encounters:   01/11/23 130 lb (59 kg)     Mental Status:  {IP PT MENTAL STATUS:32173}    IV Access:  {[facility] IV ACCESS:923404524}    Nursing Mobility/ADLs:  Walking   {P DME RATN:760129025}  Transfer  {P DME OMDQ:945633201}  Bathing  {P DME UHYU:336783985}  Dressing  {[facility] DME FRWI:489975637}  Toileting  {P DME HJWT:125016563}  Feeding  {P DME FRMN:026197601}  Med Admin  {CHP DME PYRF:772265400}  Med Delivery   {[facility] MED Delivery:956345901}    Wound Care Documentation and Therapy:  Wound 22 Sacrum Mid (Active)   Dressing/Treatment Pharmaceutical agent (see MAR); Open to air 23 1915   Wound Length (cm) 0.1 cm 23 1434   Wound Width (cm) 0.1 cm 23 1434   Wound Surface Area (cm^2) 0.01 cm^2 23 1434   Change in Wound Size % (l*w) 96 23 1434   Number of days: 22        Elimination:  Continence: Bowel: {YES / TI:29633}  Bladder: {YES / PY:18312}  Urinary Catheter: {Urinary Catheter:113904503}   Colostomy/Ileostomy/Ileal Conduit: {YES / CD:93188}       Date of Last BM: ***    Intake/Output Summary (Last 24 hours) at 2023 1318  Last data filed at 2023 1101  Gross per 24 hour   Intake 1150 ml   Output 1300 ml   Net -150 ml     I/O last 3 completed shifts: In: 750 [P.O.:150;  I.V.:600]  Out: 1300 [Urine:1300]    Safety Concerns:     { DENISHA Safety Concerns:728844271}    Impairments/Disabilities:      { DENISHA Impairments/Disabilities:603830921}    Nutrition Therapy:  Current Nutrition Therapy:   [facility] DENISHA Diet List:480797973}    Routes of Feeding: {[facility] DME Other Feedings:125912671}  Liquids: {Slp liquid thickness:62806}  Daily Fluid Restriction: {[facility] DME Yes amt example:865450844}  Last Modified Barium Swallow with Video (Video Swallowing Test): {Done Not Done RUOQ:845142415}    Treatments at the Time of Hospital Discharge:   Respiratory Treatments: ***  Oxygen Therapy:  {Therapy; copd oxygen:20622}  Ventilator:    { CC Vent YEND:437587282}    Rehab Therapies: Physical Therapy and Occupational Therapy  Weight Bearing Status/Restrictions: [facility] Weight Bearin}  Other Medical Equipment (for information only, NOT a DME order):  {EQUIPMENT:441530303}  Other Treatments: ***    Patient's personal belongings (please select all that are sent with patient):  {[facility] DME Belongings:310527847}    RN SIGNATURE:  {Esignature:054861616}    CASE MANAGEMENT/SOCIAL WORK SECTION    Inpatient Status Date: ***    Readmission Risk Assessment Score:  Readmission Risk              Risk of Unplanned Readmission:  17           Discharging to Facility/ Agency   Name: [facility]  Address:  Phone:  Fax:    Dialysis Facility (if applicable)   Name:  Address:  Dialysis Schedule:  Phone:  Fax:    / signature: Electronically signed by ROSALINDA Reeder on 1/13/2023 at 12:11 PM        PHYSICIAN SECTION    Prognosis: {Prognosis:5781446094}    Condition at Discharge: [facility] Patient Condition:355115773}    Rehab Potential (if transferring to Rehab): {Prognosis:2894764905}    Recommended Labs or Other Treatments After Discharge: ***    Physician Certification: I certify the above information and transfer of Jana Rosario  is necessary for the continuing treatment of the diagnosis listed and that he requires Reymundo Waters for greater 30 days.      Update Admission H&P: No change in H&P    PHYSICIAN SIGNATURE:  Electronically signed by Diane Velasco MD on 1/15/23 at 11:00 AM EST

## 2023-01-12 NOTE — PROGRESS NOTES
Informed by phlebotomy that patient refusing labs. This RN educated patient and attempted lab draw, patient adamantly refusing.  Will attempt again

## 2023-01-12 NOTE — CARE COORDINATION
SOCIAL WORK/CASEMANAGEMENT TRANSITION OF CARE GENNYMeme Olmstead, [address]):  spoke with manoj at [facility] and the appeal process has ended for the gretchen since pt has been discharge. They will not  have a bed for pt. I called wife and discussed this with her. She is aware that if denied by insurance or doesn't met criteria for rehab that any gretchen will be private pay and will require 30 days upfront prior to admission. Wife is in agreement to this and said she has a appt with a elder law . Based on the finances the couple has I told her he will be private pay for sometime. I left a vm for alfie torres rep regarding this and to let me know if they will take pt. PT and OT to [facility]. I emailed the wife the gretchen list at [email or web address] she is to call me with alternatives by tomorrow a.m. ROSALINDA Diaz  1/12/2023    [facility] Will need to determine if [facility] vs private pay. Wife called and she is happy. All discharge paper work is in place with [facility]. ROSALINDA Diaz  ,04/12/23

## 2023-01-12 NOTE — PROGRESS NOTES
Physical Therapy    Date: 2023       Patient Name: Cecilia Ochoa  : 1950      MRN: [id number]    Physical therapy order received and chart reviewed. PT evaluation attempted this PM. Pt declined at this time. Will follow up as appropriate.      Roby Veloz PT, DPT  [id number]

## 2023-01-12 NOTE — PROGRESS NOTES
SPEECH/LANGUAGE PATHOLOGY  CLINICAL ASSESSMENT OF SWALLOWING FUNCTION   and PLAN OF CARE    PATIENT NAME:  Marquez Mccarthy  (male)     MRN:  [id number]    :  1950  (74 y.o.)  STATUS:  Inpatient: Room 8502/8502-A    TODAY'S DATE:  23 1030    SLP eval and treat  Start:  23 1030,   End:  23 1030,   ONE TIME,   Standing Count:  1 Occurrences,   R         Any Cruz MD   REASON FOR REFERRAL: assess oropharyngeal swallow function   EVALUATING THERAPIST: JEANNE Garcia                 RESULTS:    DYSPHAGIA DIAGNOSIS:   Clinical indicators of profound  oral phase dysphagia  and questionable/ likely pharyngeal phase dysphagia       DIET RECOMMENDATIONS:  NPO with ongoing PO analysis by SLP only to determine if PO diet can be initiated  -- recommend non-oral medications as appropriate      FEEDING RECOMMENDATIONS:     Assistance level:  Not applicable      Compensatory strategies recommended: Not applicable      Discussed recommendations with nursing and/or faxed report to referring provider: Yes    SPEECH THERAPY  PLAN OF CARE   The dysphagia POC is established based on physician order, dysphagia diagnosis and results of clinical assessment     Skilled SLP intervention for dysphagia management up to 5x per week until goals met, pt plateaus in function and/or discharged from hospital    Conditions Requiring Skilled Therapeutic Intervention for dysphagia:    Patient is performing below functional baseline d/t  current acute condition, Multiple diagnoses, multiple medications, and increased dependency upon caregivers.   Reduced bolus formation and/or manipulation  Throat clearing during PO intake   Inability to self feed increases risk aspiration pneumonia (Ren et al.,1998.)  Higher risk of aspiration in individuals unable to to follow 1- step commands JAD Cisneros., TAM Atwood., & MARY Adhikari. 2009.)  History of dysphagia/altered consistency      Specific dysphagia interventions to include:     ongoing skilled PO analysis to determine if PO diet can be initiated     Specific instructions for next treatment:  ongoing skilled analysis to determine if patient is able to participate in MBSS   and ongoing skilled PO analysis to determine if PO diet can be initiated   Patient Treatment Goals:    Short Term Goals:  Pt will participate in ongoing evaluation of swallow function to determine when PO diet can be safely initiated    Long Term Goals:   Pt will improve oropharyngeal swallow function to ensure airway protection during PO intake to maintain adequate nutrition/hydration and decrease signs/symptoms of aspiration to less than 1 x/day. Patient/family Goal:    Did not state. Will further assess during treatment. Plan of care discussed with Patient   The Patient did not demonstrate complete understanding of the diagnosis, prognosis and plan of care     Rehabilitation Potential/Prognosis: fair                    ADMITTING DIAGNOSIS: Metabolic encephalopathy [Y30.91]  Complicated UTI (urinary tract infection) [N39.0]  Nursing home resident [Z59.3]  Sepsis ([facility] [A41.9]  Sepsis with acute renal failure, due to unspecified organism, unspecified acute renal failure type, unspecified whether septic shock present ([facility] [A41.9, R65.20, N17.9]    VISIT DIAGNOSIS:   Visit Diagnoses         Codes    Complicated UTI (urinary tract infection)     Y01.6    Metabolic encephalopathy     G93.41    Nursing home resident     Z59.3             PATIENT REPORT/COMPLAINT: patient not able to accurately report  patient currently NPO pending results of this evaluation  patient currently on modified diet.   RN cleared patient for participation in assessment     yes     PRIOR LEVEL OF SWALLOW FUNCTION:    PAST HISTORY OF DYSPHAGIA?: yes    Home diet: Pureed consistency solids (IDDSI level 4) with  thin liquids (IDDSI level 0)  Current Diet Order:  Diet NPO    PROCEDURE:  Consistencies Administered During the Evaluation   Liquids: thin liquid and ice chips   Solids:  pureed foods      Method of Intake:   spoon  Fed by clinician      Position:   Seated, upright    CLINICAL ASSESSMENT:  Oral Stage:        Reduced oral acceptance from spoon, coated spoon  Impaired oral initiation  Delayed A-P transit due to: decreased ability for initiation   , reduced lingual strength , and cognitive function   Oral residuals were noted :  sub-lingually and on the base of the tongue  Multi-modal stimulation to initiate swallow onset      Pharyngeal Stage:    Throat clearing present after presentation of pureed foods  Delayed initiation of the pharyngeal swallow noted  Absent swallow    Cognition:   Did not follow commands, Confusion noted, and Attention impaired,     Oral Peripheral Examination   Generalized oral weakness    Current Respiratory Status    room air     Parameters of Speech Production  Respiration:  Inadequate for speech production  Quality:   Strained  Intensity: Quiet    Volitional Swallow: not able to elicit     Volitional Cough:   not able to elicit     Pain: No pain reported. EDUCATION:   The Speech Language Pathologist (SLP) completed education regarding results of evaluation and that intervention is warranted at this time. Learner: Patient  Education: Reviewed results and recommendations of this evaluation and Reviewed diet and strategies  Evaluation of Education:  No evidence of learning    This plan may be re-evaluated and revised as warranted. Evaluation Time includes thorough review of current medical information, gathering information on past medical history/social history and prior level of function, completion of standardized testing/informal observation of tasks, assessment of data and education on plan of care and goals. [x]The admitting diagnosis and active problem list, have been reviewed prior to initiation of this evaluation.         ACTIVE PROBLEM LIST:   Patient Active Problem List Diagnosis    BMI 26.0-26.9,adult    Benign essential hypertension    GERD (gastroesophageal reflux disease)    Rosacea    Hyperlipidemia    Current moderate episode of major depressive disorder without prior episode ([facility]    Insomnia    Ataxia    Fall    Traumatic subarachnoid hemorrhage with unknown loss of consciousness status    Facial contusion    Nail avulsion of toe    SAH (subarachnoid hemorrhage) ([facility]    [facility]eps[facility]         CPT code:  72978  bedside swallow eval    INTERVENTION  CPT Code: 57936  dysphagia tx    Therapeutic intervention completed in the form of varying oral stim tasks. Various attempts made to elicit oral closure, AP transit, and attempts to facilitate improved bolus awareness in oral cavity with poor outcome. Pt appeared to bunch tongue in back of oral cavity when bolus presented. Cues ineffective.      Peggy Chadwick M.S., [address] Pathologist  [id number]  1/12/2023

## 2023-01-12 NOTE — PROGRESS NOTES
Subjective:    Chief complaint:    Awake, confused  Mentation slightly better since admission  Patient apparently has been declining blood draws  No problems overnight. Denies chest pain, angina, and dyspnea. Denies abdominal pain. Tolerating diet. No nausea or vomiting no issues overnight. Objective:    BP (!) 158/85   Pulse 76   Temp 97.7 °F (36.5 °C)   Resp 16   Ht 5' 6\" (1.676 m)   Wt 130 lb (59 kg)   SpO2 98%   BMI 20.98 kg/m²   General : Awake, confused without agitation or distress  Heart:  RRR, no murmurs, gallops, or rubs.   Lungs:  CTA bilaterally, no wheeze, rales or rhonchi  Abd: bowel sounds present, nontender, nondistended, no masses  Extrem:  No clubbing, cyanosis, or edema    CBC:   Lab Results   Component Value Date/Time    WBC 11.2 01/10/2023 09:14 AM    RBC 3.52 01/10/2023 09:14 AM    HGB 10.4 01/10/2023 09:14 AM    HCT 33.2 01/10/2023 09:14 AM    MCV 94.3 01/10/2023 09:14 AM    MCH 29.5 01/10/2023 09:14 AM    MCHC 31.3 01/10/2023 09:14 AM    RDW 14.6 01/10/2023 09:14 AM     01/10/2023 09:14 AM    MPV 9.9 01/10/2023 09:14 AM     BMP:    Lab Results   Component Value Date/Time     01/12/2023 11:08 AM    K 3.3 01/12/2023 11:08 AM    K 5.4 12/27/2022 09:52 AM     01/12/2023 11:08 AM    CO2 18 01/12/2023 11:08 AM    BUN 37 01/12/2023 11:08 AM    LABALBU 3.0 01/10/2023 09:14 AM    LABALBU 4.2 06/28/2011 12:00 PM    CREATININE 1.1 01/12/2023 11:08 AM    CALCIUM 9.0 01/12/2023 11:08 AM    GFRAA >60 08/03/2022 09:39 AM    LABGLOM >60 01/12/2023 11:08 AM    GLUCOSE 82 01/12/2023 11:08 AM    GLUCOSE 90 06/28/2011 12:00 PM     PT/INR:    Lab Results   Component Value Date/Time    PROTIME 12.0 01/10/2023 11:45 AM    INR 1.1 01/10/2023 11:45 AM     Troponin:  No results found for: TROPONINI    Recent Labs     01/11/23  0154   LABURIN Growth present, evaluating for:  Nonhemolytic Strep species  Mixed gram negative rods       Recent Labs     01/10/23  0914   BC 24 Hours no growth Recent Labs     01/10/23  0914   BLOODCULT2 24 Hours no growth         Current Facility-Administered Medications:     potassium bicarb-citric acid (EFFER-K) effervescent tablet 40 mEq, 40 mEq, Oral, Daily, Xavier Bryant MD    sertraline (ZOLOFT) tablet 25 mg, 25 mg, Oral, Daily, Xavier Bryant MD, 25 mg at 01/11/23 1227    aspirin chewable tablet 81 mg, 81 mg, Oral, Daily, Xavier Bryant MD, 81 mg at 01/11/23 1227    magnesium oxide (MAG-OX) tablet 400 mg, 400 mg, Oral, Daily, Xavier Bryant MD, 400 mg at 01/11/23 1227    sucralfate (CARAFATE) tablet 1 g, 1 g, Oral, BID, Xavier Bryant MD, 1 g at 01/11/23 1710    pantoprazole (PROTONIX) tablet 40 mg, 40 mg, Oral, QAM AC, Xavier Bryant MD, 40 mg at 01/12/23 0503    0.9 % sodium chloride infusion, , IntraVENous, Continuous, Xavier Bryant MD, Last Rate: 100 mL/hr at 01/12/23 1251, Rate Verify at 01/12/23 1251    cefepime (MAXIPIME) 2,000 mg in sodium chloride 0.9 % 50 mL IVPB ([id number]), 2,000 mg, IntraVENous, Q24H, Xavier Bryant MD, Last Rate: 12.5 mL/hr at 01/12/23 1252, 2,000 mg at 01/12/23 1252    Diet NPO    CT HEAD WO CONTRAST   Final Result   1. No evidence of acute intracranial abnormality. 2. Stable cortical calcification in the right temporal lobe, likely related   to recent subarachnoid hemorrhage as discussed above. 3. Chronic white matter ischemic changes. Mild atrophy. XR CHEST PORTABLE   Final Result   1. There is no acute cardiopulmonary disease             Assessment:    Principal Problem:    Sepsis ([address]  Resolved Problems:    * No resolved hospital problems.  *  Hyponatremia  Acute kidney injury    Plan:    Patient presents for blood work after much discussion  Repeat blood work shows sodium of 160 with potassium 3.3  Creatinine has improved  Mentation slightly improving  Change IV fluids  Monitor sodium        Xavier Bryant MD  1:21 PM  1/12/2023    NOTE: This report was transcribed using voice recognition software.  Every effort was made to ensure accuracy; however, inadvertent transcription errors may be present

## 2023-01-13 NOTE — PROGRESS NOTES
Physical Therapy    Date: 2023       Patient Name: Lizbeth Weston  : 1950      MRN: [id number]    Physical therapy order received and chart reviewed. PT evaluation attempted this PM. Pt declined at this time. Pt later returned and pt was off unit for other medical care. Will follow up as appropriate.      Tal Rodriguez PT, DPT  [id number]

## 2023-01-13 NOTE — PROGRESS NOTES
Subjective:    Chief complaint:    Awake, confused  Seen this morning  Mentation has improved    Objective:    /88   Pulse 78   Temp 97.5 °F (36.4 °C) (Temporal)   Resp 18   Ht 5' 6\" (1.676 m)   Wt 130 lb (59 kg)   SpO2 97%   BMI 20.98 kg/m²   General : Awake, confused without agitation or distress  Heart:  RRR, no murmurs, gallops, or rubs. Lungs:  CTA bilaterally, no wheeze, rales or rhonchi  Abd: bowel sounds present, nontender, nondistended, no masses  Extrem:  No clubbing, cyanosis, or edema    CBC:   Lab Results   Component Value Date/Time    WBC 11.2 01/10/2023 09:14 AM    RBC 3.52 01/10/2023 09:14 AM    HGB 10.4 01/10/2023 09:14 AM    HCT 33.2 01/10/2023 09:14 AM    MCV 94.3 01/10/2023 09:14 AM    MCH 29.5 01/10/2023 09:14 AM    MCHC 31.3 01/10/2023 09:14 AM    RDW 14.6 01/10/2023 09:14 AM     01/10/2023 09:14 AM    MPV 9.9 01/10/2023 09:14 AM     BMP:    Lab Results   Component Value Date/Time     01/13/2023 05:40 AM    K 3.7 01/13/2023 05:40 AM    K 5.4 12/27/2022 09:52 AM     01/13/2023 05:40 AM    CO2 16 01/13/2023 05:40 AM    BUN 30 01/13/2023 05:40 AM    LABALBU 3.0 01/10/2023 09:14 AM    LABALBU 4.2 06/28/2011 12:00 PM    CREATININE 1.0 01/13/2023 05:40 AM    CALCIUM 8.7 01/13/2023 05:40 AM    GFRAA >60 08/03/2022 09:39 AM    LABGLOM >60 01/13/2023 05:40 AM    GLUCOSE 60 01/13/2023 05:40 AM    GLUCOSE 90 06/28/2011 12:00 PM     PT/INR:    Lab Results   Component Value Date/Time    PROTIME 12.0 01/10/2023 11:45 AM    INR 1.1 01/10/2023 11:45 AM     Troponin:  No results found for: TROPONINI    Recent Labs     01/11/23  0154   LABURIN Growth present, evaluating for:  Nonhemolytic Strep species  Mixed gram negative rods  *  >100,000 CFU/ml  Identification and sensitivity to follow    >100,000 CFU/ml  Identification and sensitivity to follow       No results for input(s): BC in the last 72 hours. No results for input(s): Redchantelle Maria in the last 72 hours.         Current Facility-Administered Medications:     glucose chewable tablet 16 g, 4 tablet, Oral, PRN, Frederic Page MD    dextrose bolus 10% 125 mL, 125 mL, IntraVENous, PRN, Stopped at 01/13/23 0913 **OR** dextrose bolus 10% 250 mL, 250 mL, IntraVENous, PRN, Viktor Mccarthy MD    glucagon (rDNA) injection 1 mg, 1 mg, SubCUTAneous, PRN, Frederic Page MD    dextrose 10 % infusion, , IntraVENous, Continuous PRN, Frederic Page MD    potassium bicarb-citric acid (EFFER-K) effervescent tablet 40 mEq, 40 mEq, Oral, Daily, Viktor Skelton MD    0.45 % NaCl with KCl 20 mEq infusion, , IntraVENous, Continuous, Frederic Page MD, Last Rate: 100 mL/hr at 01/13/23 1135, New Bag at 01/13/23 1135    sertraline (ZOLOFT) tablet 25 mg, 25 mg, Oral, Daily, Frederic Page MD, 25 mg at 01/11/23 1227    aspirin chewable tablet 81 mg, 81 mg, Oral, Daily, Frederic Page MD, 81 mg at 01/11/23 1227    magnesium oxide (MAG-OX) tablet 400 mg, 400 mg, Oral, Daily, Frederic Page MD, 400 mg at 01/11/23 1227    sucralfate (CARAFATE) tablet 1 g, 1 g, Oral, BID, Frederic Page MD, 1 g at 01/11/23 1710    pantoprazole (PROTONIX) tablet 40 mg, 40 mg, Oral, QAM AC, Frederic Page MD, 40 mg at 01/12/23 0503    cefepime (MAXIPIME) 2,000 mg in sodium chloride 0.9 % 50 mL IVPB ([id number]), 2,000 mg, IntraVENous, Q24H, Frederic Page MD, Stopped at 01/13/23 1545    ADULT DIET; Dysphagia - Pureed    FL MODIFIED BARIUM SWALLOW W VIDEO   Final Result   Severe oral delay. No evidence of aspiration or laryngeal penetration. Please see separate speech pathology report for full discussion of findings   and recommendations. RECOMMENDATIONS:   Unavailable         CT HEAD WO CONTRAST   Final Result   1. No evidence of acute intracranial abnormality. 2. Stable cortical calcification in the right temporal lobe, likely related   to recent subarachnoid hemorrhage as discussed above. 3. Chronic white matter ischemic changes. Mild atrophy. XR CHEST PORTABLE   Final Result   1. There is no acute cardiopulmonary disease             Assessment:    Principal Problem:    Sepsis ([address]  Resolved Problems:    * No resolved hospital problems. *  Hyponatremia  Acute kidney injury    Plan:  Potassium normal today  Sodium slowly improving  Increase activity as able  Hypoglycemia orders      Justice Siddiqui MD  4:07 PM  1/13/2023    NOTE: This report was transcribed using voice recognition software.  Every effort was made to ensure accuracy; however, inadvertent transcription errors may be present

## 2023-01-13 NOTE — PROGRESS NOTES
Occupational Therapy  OCCUPATIONAL THERAPY INITIAL EVALUATION     [address] [address] [address]      VUFY:                                                Patient Name: Avril Hardin  MRN: [id number]  : 1950  Room: [address] [place] [address] [address][id number]    Referring Provider: Adela Saenz MD  Specific Provider Orders/Date: OT eval and treat 23     Diagnosis: Metabolic encephalopathy [G17.31]  Complicated UTI (urinary tract infection) [N39.0]  Nursing home resident [Z59.3]  Sepsis [facility]) [A41.9]  Sepsis with acute renal failure, due to unspecified organism, unspecified acute renal failure type, unspecified whether septic shock present ([facility] [address] [A41.9, R65.20, N17.9]   Pt admitted to hospital on 1/10/23 for AMS      Pertinent Medical History:  has a past medical history of Diverticulitis, GERD (gastroesophageal reflux disease), High cholesterol, Hypertension, and Rosacea.        Precautions:  Fall Risk, cognition, incontinent, andersen, TAPS, bed alarm    Assessment of current deficits    [x] Functional mobility  [x]ADLs  [x] Strength               [x]Cognition    [x] Functional transfers   [x] IADLs         [x] Safety Awareness   [x]Endurance    [] Fine Coordination              [x] Balance      [] Vision/perception   []Sensation     []Gross Motor Coordination  [] ROM  [] Delirium                   [] Motor Control     OT PLAN OF CARE   OT POC based on physician orders, patient diagnosis and results of clinical assessment    Frequency/Duration 1-3 days/wk for 2 weeks PRN   Specific OT Treatment Interventions to include:   * Instruction/training on adapted ADL techniques and AE recommendations to increase functional independence within precautions       * Training on energy conservation strategies, correct breathing pattern and techniques to improve independence/tolerance for self-care routine  * Functional transfer/mobility training/DME recommendations for increased independence, safety, and fall prevention  * Patient/Family education to increase follow through with safety techniques and functional independence  * Recommendation of environmental modifications for increased safety with functional transfers/mobility and ADLs  * Cognitive retraining/development of therapeutic activities to improve problem solving, judgement, memory, and attention for increased safety/participation in ADL/IADL tasks  * Therapeutic exercise to improve motor endurance, ROM, and functional strength for ADLs/functional transfers  * Therapeutic activities to facilitate/challenge dynamic balance, stand tolerance for increased safety and independence with ADLs  * Therapeutic activities to facilitate gross/fine motor skills for increased independence with ADLs  * Positioning to improve skin integrity, interaction with environment and functional independence      Recommended Adaptive Equipment: TBD     Home Living: Pt admitted from NH (per chart)    Prior Level of Function: unable to obtain from pt d/t cognition. Family/caregiver not present    Pain Level: Pt c/o no pain this session    Cognition: A&O: 1/4 (to self only); Follows 1 step directions inconsistently (increased difficulty initially and at end of session)  Confusion noted - cues provided to initiate, sequence and attend to tasks.   Limited verbal communication, flat affect   Memory:  poor   Sequencing:  poor   Problem solving:  poor   Judgement/safety:  poor     Functional Assessment:  AM-PAC Daily Activity Raw Score: 9/24   Initial Eval Status  Date: 1/13/23 Treatment Status  Date: STGs = LTGs  Time frame: 10-14 days   Feeding NPO  -  Will continue to assess   Grooming Maximal Assist   Minimal Assist    UB Dressing Maximal Assist   Minimal Assist    LB Dressing Dependent   Moderate Assist    Bathing Dependent  Moderate Assist    Toileting Dependent   Moderate Assist    Bed Mobility Rolling: Max A  Supine to sit: Maximal Assist   Sit to supine: Maximal Assist   Supine to sit: Minimal Assist   Sit to supine: Minimal Assist    Functional Transfers NT  Deferred d/t noted fatigue as EOB activity progressed  Moderate Assist    Functional Mobility NT  Moderate Assist    Balance Sitting:     Static:  Min A    Dynamic: Mod A  Standing: NT     Activity Tolerance Poor  Fair   Visual/  Perceptual Glasses: no                  Hand Dominance R   AROM (PROM) Strength Additional Info:    RUE  Distal: WFL  Proximal: limited Pt unable to follow commands/cues for formal MMT fair  and fair- FMC/dexterity noted during ADL tasks       LUE Distal: WFL  Proximal: limited Pt unable to follow commands/cues for formal MMT fair  and wfl FMC/dexterity noted during ADL tasks       [email or web address]  Sensation: unable to assess d/t cognition  Tone: WFL   Edema: none noted    Comments: Upon arrival patient lying in bed. Therapist educated pt on role of OT. RN clearance. At end of session, patient lying in bed (bed alarm on) with call light and phone within reach, all lines and tubes intact. Overall patient demonstrated decreased independence and safety during completion of ADL/functional transfer/mobility tasks. Pt would benefit from continued skilled OT to increase safety and independence with completion of ADL/IADL tasks for functional independence and quality of life. Treatment: OT treatment provided this date includes: Facilitation of bed mobility (education/cues for body mechanics) and unsupported sitting balance (addressing posture, weight shifting, dynamic reaching to prep for ADL's. Pt tolerated ~8 minutes seated EOB for static and light dynamic tasks. Noted fatigue as progressed therefore frequent rest breaks provided). Therapist facilitated self-care retraining: UB self-care tasks (gown) and grooming tasks while educating/cuing pt on modified techniques, posture, safety and energy conservation techniques. Increased cues also provided for sequencing, attention and initiation of tasks. Therapist then facilitated bed mobility (sit>supine and rolling L/R for pad change, toileting task) w/ education/cues for initiation, safety and body mechanics. Facilitated bed repositioning w/ TAPS and pillow props for skin and join integrity. Skilled monitoring of HR, O2 sats and pts response to treatment.       Rehab Potential: Good for established goals     Patient / Family Goal: not stated      Patient and/or family were instructed on functional diagnosis, prognosis/goals and OT plan of care. Demonstrated poor understanding.     Eval Complexity: Low    Time In: 08:34  Time Out: 09:10  Total Treatment Time: 24 minutes    Min Units   OT Eval Low 97165  X  1   OT Eval Medium 95560      OT Eval High 10224      OT Re-Eval 55133       Therapeutic Ex 34214       Therapeutic Activities 41975  11  1   ADL/Self Care 11179  13  1   Orthotic Management 21539       Manual [id number]     Neuro Re-Ed [id number]       Non-Billable Time          Evaluation Time additionally includes thorough review of current medical information, gathering information on past medical history/social history and prior level of function, interpretation of standardized testing/informal observation of tasks, assessment of data and development of plan of care and goals.        Chetna Brown, OTR/L #[id number]

## 2023-01-13 NOTE — PLAN OF CARE
Problem: Discharge Planning  Goal: Discharge to home or other facility with appropriate resources  Outcome: Progressing     Problem: Skin/Tissue Integrity  Goal: Absence of new skin breakdown  Description: 1.  Monitor for areas of redness and/or skin breakdown  2.  Assess vascular access sites hourly  3.  Every 4-6 hours minimum:  Change oxygen saturation probe site  4.  Every 4-6 hours:  If on nasal continuous positive airway pressure, respiratory therapy assess nares and determine need for appliance change or resting period.  Outcome: Progressing     Problem: Safety - Adult  Goal: Free from fall injury  Outcome: Progressing

## 2023-01-13 NOTE — CARE COORDINATION
SOCIAL WORK/CASEMANAGEMENT TRANSITION OF CARE PLANNINGRenata Olmstead, [address]): once Pt and oT notes are in epic then alfie torres will start precert. All discharge paper work is in place with pasrr done. Pt will be here over weekend if ready for discharge due to this.  ROSALINDA Boston  1/13/2023

## 2023-01-13 NOTE — PROGRESS NOTES
SPEECH/LANGUAGE PATHOLOGY  VIDEOFLUOROSCOPIC STUDY OF SWALLOWING (MBS)   and PLAN OF CARE    PATIENT NAME:  Frank Lundberg  (male)     MRN:  [id number]    :  1950  (74 y.o.)  STATUS:  Inpatient: Room 8502/8502-A    TODAY'S DATE:  2023  REFERRING PROVIDER:   Dr. Bateman Sing: [place] modified barium swallow with video  Date of order:  23   REASON FOR REFERRAL: further assess oropharyngeal swallow function   EVALUATING THERAPIST: JEANNE Pineda      RESULTS:      DYSPHAGIA DIAGNOSIS:  profound  oral phase dysphagia, mild pharyngeal phase dysphagia    DIET RECOMMENDATIONS:  Pureed consistency solids (IDDSI level 4) with  thin liquids (IDDSI level 0)    MEDICATION ADMINISTRATION, and Administer medication crushed, as able, with pudding/applesauce    FEEDING RECOMMENDATIONS:    Assistance level:  Full assistance is needed during all oral intake as well as verbal cues to initiate swallow at times     Compensatory strategies recommended: Small bites/sips, Alternate solids and liquids, Check for oral pocketing, and Liquids by teaspoon only     Discussed recommendations with nursing and/or faxed report to referring provider: Yes    Laryngeal Penetration and Aspiration:  Neither penetration nor aspiration was observed in today's study     SPEECH THERAPY  PLAN OF CARE   The dysphagia POC is established based on physician order and dysphagia diagnosis    Meal time assessment for 1-2 sessions to provide diet modification and compensatory strategy implementation to safely advance diet as functional ability improves      Conditions Requiring Skilled Therapeutic Intervention for dysphagia:    Oral motor strength/coordination impairment  Inability to self feed increases risk aspiration pneumonia (Ren et al.,1998.)  Higher risk of aspiration in individuals unable to to follow 1- step commands JAD Francis., Elnita Osler, D.M., & MARY Gooden. 2009.)    SPECIFIC DYSPHAGIA INTERVENTIONS TO INCLUDE:     Therapeutic exercises  Trials of upgraded diet/liquid     Specific instructions for next treatment:  development and training of compensatory swallow strategies to improve airway protection and swallow function, initiate instruction of therapeutic exercises , and initiate instruction of compensatory strategies  Treatment Goals:    Short Term Goals:  Pt will participate in meal time assessment for 1-2 sessions to provide diet modification and compensatory strategy implementation to safely advance diet as functional ability improves    Long Term Goals:   Pt will maintain adequate nutrition/hydration via PO intake of the least restrictive oral diet with implementation of safe swallow/ compensatory strategies and decrease signs/symptoms of aspiration to less than 1 x/day. Patient/family Goal:    Did not state. Will further assess during treatment.     Plan of care discussed with Patient   The Patient did not demonstrate complete understanding of the diagnosis, prognosis and plan of care     Rehabilitation Potential/Prognosis: fair                      ADMITTING DIAGNOSIS: Metabolic encephalopathy [X21.13]  Complicated UTI (urinary tract infection) [N39.0]  Nursing home resident [Z59.3]  Seps[facility] ([facility] [A41.9]  Sepsis with acute renal failure, due to unspecified organism, unspecified acute renal failure type, unspecified whether septic shock present ([facility] [A41.9, R65.20, N17.9]     VISIT DIAGNOSIS:   Visit Diagnoses         Codes    Complicated UTI (urinary tract infection)     K99.1    Metabolic encephalopathy     G93.41    Nursing home resident     Z59.3                PATIENT REPORT/COMPLAINT: patient currently NPO pending results of this evaluation    PRIOR LEVEL OF SWALLOW FUNCTION:    Past History of Dysphagia?:  yes    Home diet: Pureed consistency solids (IDDSI level 4) with  thin liquids (IDDSI level 0)  Current Diet Order:  Diet NPO    PROCEDURE:  Consistencies Administered During the Evaluation   Liquids: thin liquid and nectar thick liquid   Solids:  pureed foods      Method of Intake:   cup, spoon  Fed by clinician      Position:   Seated, upright    INSTRUMENTAL ASSESSMENT:    ORAL PREP/ ORAL PHASE:    Reduced oral acceptance from cup, spoon  Impaired oral initiation  Delayed A-P transit due to: decreased ability for initiation   , reduced lingual strength , and cognitive function   Lingual pumping present  Decreased bolus formation resulting in observed premature pharyngeal spillage  Multi-modal stimulation to initiate swallow onset     PHARYNGEAL PHASE:     ONSET TIME       Delayed initiation of the pharyngeal swallow was noted with swallow reflex triggered at the level of the pyriform sinus        PHARYNGEAL RESIDUALS        Vallecula/Pharyngeal Wall           No significant residuals were noted in the vallecula      Pyriform Sinuses      No significant residuals were noted in the pyriform sinuses     LARYNGEAL PENETRATION   Laryngeal penetration was not present during this evaluation    ASPIRATION  Aspiration was not present during this evaluation    PENETRATION-ASPIRATION SCALE (PAS):  THIN 1 = Material does not enter the airway  MILDLY THICK 1 = Material does not enter the airway  MODERATELY THICK item not administered  PUREE 1 = Material does not enter the airway  HARD SOLID item not administered       COMPENSATORY STRATEGIES    Compensatory strategies that were beneficial included Small bites/sips, Alternate solids and liquids, Check for oral pocketing, and Liquids by teaspoon only      STRUCTURAL/FUNCTIONAL ANOMALIES   No structural/functional anomalies were noted    CERVICAL ESOPHAGEAL STAGE :     The cervical esophagus appeared adequate          ___________    Cognition:   Latent, Inconsistent ability to follow commands, inconsistent response to verbal stimuli    Oral Peripheral Examination   Adequate lingual/labial strength     Current Respiratory Status   room air     Parameters of Speech Production  Respiration:  Adequate for speech production  Quality:   Within functional limits  Intensity: Quiet    Pain: No pain reported. EDUCATION:   The Speech Language Pathologist (SLP) completed education regarding results of evaluation and that intervention is warranted at this time. Learner: Patient  Education: Reviewed results and recommendations of this evaluation and Reviewed diet and strategies  Evaluation of Education:  No evidence of learning    This plan may be re-evaluated and revised as warranted. Evaluation Time includes thorough review of current medical information, gathering information on past medical history/social history and prior level of function, completion of standardized testing/informal observation of tasks, assessment of data and education on plan of care and goals. [x]The admitting diagnosis and active problem list, have been reviewed prior to initiation of this evaluation. CPT Code: 13929  dysphagia study    ACTIVE PROBLEM LIST:   Patient Active Problem List   Diagnosis    BMI 26.0-26.9,adult    Benign essential hypertension    GERD (gastroesophageal reflux disease)    Rosacea    Hyperlipidemia    Current moderate episode of major depressive disorder without prior episode ([address]    Insomnia    Ataxia    Fall    Traumatic subarachnoid hemorrhage with unknown loss of consciousness status    Facial contusion    Nail avulsion of toe    SAH (subarachnoid hemorrhage) ([facility])    Sepsis ([address]       INTERVENTION  CPT Code: 38346  dysphagia tx    Speech Pathologist (SLP) completed education with the patient/family regarding procedure of Modified Barium Swallow Study prior to exam and then type of swallowing impairment following completion of MBSS.  Reviewed current solid/liquid consistency diet recommendations --   Dysphagia 1, Pureed solids with  thin liquids (IDDSI level 0) and discussed compensatory strategies (Small bites/sips, Alternate solids and liquids, Check for oral pocketing, and Liquids by teaspoon only) to ensure safe PO intake. Pt did not appear to demonstrate understanding. SLP provider oral stimulation tasks as well as verbal edu to facilitate more timely swallow with poor outcome.

## 2023-01-13 NOTE — PROGRESS NOTES
Notified Dr. Rogelio Becerril pt has paperwork for DNR CCA in soft chart.  Also notified that blood sugar was 66 and no hypoglycemic protocol ordered

## 2023-01-13 NOTE — PROGRESS NOTES
SPEECH/LANGUAGE PATHOLOGY  Clinical Re-Assessment of Swallow Function    PATIENT NAME:  Justyn Romero  (male)     MRN:  [id number]    :  1950  (74 y.o.)      TODAY'S DATE:  2023    EVALUATING THERAPIST: JEANNE Brantley                 RESULTS:    DYSPHAGIA DIAGNOSIS:   Clinical indicators of ongoing oropharyngeal phase dysphagia       DIET RECOMMENDATIONS:  NPO until MBSS can be completed        FEEDING RECOMMENDATIONS:     Assistance level:  Not applicable      Compensatory strategies recommended: Not applicable      Discussed recommendations with nursing and/or faxed report to referring provider: Yes    RN cleared patient for participation in assessment     yes       PROCEDURE:  Consistencies Administered During the Evaluation   Liquids: thin liquid and ice chips   Solids:  pureed foods      Method of Intake:   cup, spoon  Fed by clinician      Position:   Seated, upright    CLINICAL ASSESSMENT:  Oral Stage:       Impaired oral initiation  Delayed A-P transit due to: reduced lingual strength  and cognitive function   Lingual pumping present  Decreased bolus formation resulting in suspected premature pharyngeal spillage      Pharyngeal Stage:    Throat clearing present after presentation of thin liquid  Immediate wet cough was noted after presentation of thin liquid  Wet/gurgly vocal quality was noted after presentation of all consistencies administered    Cognition:   Confusion noted    Oral Peripheral Examination   Generalized oral weakness    Current Respiratory Status    room air     Parameters of Speech Production  Respiration:  Adequate for speech production  Quality:   Breathy  Intensity: Quiet    Volitional Swallow: not able to elicit     Volitional Cough:   not able to elicit         71132  dysphagia tx    Jose M Brantley M.S., CCC-SLP  Speech-Language Pathologist  [id number]  2023

## 2023-01-13 NOTE — PROGRESS NOTES
Dr. Isa Cross stated okay to place DNR CCA code status and okay to place orders for hypoglycemia protocol orders

## 2023-01-14 NOTE — CARE COORDINATION
Received call from Shama Anderson, they received precert.  Called unit and spoke with RN and notified them of precert and to check on d/c.Kell Serna, MSW, LSW

## 2023-01-14 NOTE — PROGRESS NOTES
Subjective:    Chief complaint:    More alert   No new issues    Objective:    BP (!) 120/95   Pulse 86   Temp 97.7 °F (36.5 °C) (Oral)   Resp 18   Ht 5' 6\" (1.676 m)   Wt 130 lb (59 kg)   SpO2 94%   BMI 20.98 kg/m²   General : Awake, confused without agitation or distress  Heart:  RRR, no murmurs, gallops, or rubs. Lungs:  CTA bilaterally, no wheeze, rales or rhonchi  Abd: bowel sounds present, nontender, nondistended, no masses  Extrem:  No clubbing, cyanosis, or edema    CBC:   Lab Results   Component Value Date/Time    WBC 11.2 01/10/2023 09:14 AM    RBC 3.52 01/10/2023 09:14 AM    HGB 10.4 01/10/2023 09:14 AM    HCT 33.2 01/10/2023 09:14 AM    MCV 94.3 01/10/2023 09:14 AM    MCH 29.5 01/10/2023 09:14 AM    MCHC 31.3 01/10/2023 09:14 AM    RDW 14.6 01/10/2023 09:14 AM     01/10/2023 09:14 AM    MPV 9.9 01/10/2023 09:14 AM     BMP:    Lab Results   Component Value Date/Time     01/14/2023 11:00 AM    K 3.7 01/14/2023 11:00 AM    K 5.4 12/27/2022 09:52 AM     01/14/2023 11:00 AM    CO2 19 01/14/2023 11:00 AM    BUN 25 01/14/2023 11:00 AM    LABALBU 3.0 01/10/2023 09:14 AM    LABALBU 4.2 06/28/2011 12:00 PM    CREATININE 0.9 01/14/2023 11:00 AM    CALCIUM 8.3 01/14/2023 11:00 AM    GFRAA >60 08/03/2022 09:39 AM    LABGLOM >60 01/14/2023 11:00 AM    GLUCOSE 94 01/14/2023 11:00 AM    GLUCOSE 90 06/28/2011 12:00 PM     PT/INR:    Lab Results   Component Value Date/Time    PROTIME 12.0 01/10/2023 11:45 AM    INR 1.1 01/10/2023 11:45 AM     Troponin:  No results found for: TROPONINI    No results for input(s): LABURIN in the last 72 hours. No results for input(s): BC in the last 72 hours. No results for input(s): Catia Canter in the last 72 hours.         Current Facility-Administered Medications:     glucose chewable tablet 16 g, 4 tablet, Oral, PRN, Franci Kendall MD    dextrose bolus 10% 125 mL, 125 mL, IntraVENous, PRN, Stopped at 01/13/23 0913 **OR** dextrose bolus 10% 250 mL, 250 mL, IntraVENous, PRN, Viktor Acosta MD    glucagon (rDNA) injection 1 mg, 1 mg, SubCUTAneous, PRN, Viktor Acosta MD    dextrose 10 % infusion, , IntraVENous, Continuous PRN, Stan Raphael MD    potassium bicarb-citric acid (EFFER-K) effervescent tablet 40 mEq, 40 mEq, Oral, Daily, Stan Raphael MD, 40 mEq at 01/14/23 0812    0.45 % NaCl with KCl 20 mEq infusion, , IntraVENous, Continuous, Stan Raphael MD, Last Rate: 100 mL/hr at 01/13/23 1135, New Bag at 01/13/23 1135    sertraline (ZOLOFT) tablet 25 mg, 25 mg, Oral, Daily, Stan Raphael MD, 25 mg at 01/14/23 5011    aspirin chewable tablet 81 mg, 81 mg, Oral, Daily, Stan Raphael MD, 81 mg at 01/14/23 8767    magnesium oxide (MAG-OX) tablet 400 mg, 400 mg, Oral, Daily, Stan Raphael MD, 400 mg at 01/14/23 0812    sucralfate (CARAFATE) tablet 1 g, 1 g, Oral, BID, Stan Raphael MD, 1 g at 01/14/23 0812    pantoprazole (PROTONIX) tablet 40 mg, 40 mg, Oral, QAM AC, Stan Raphael MD, 40 mg at 01/14/23 0559    cefepime (MAXIPIME) 2,000 mg in sodium chloride 0.9 % 50 mL IVPB ([id number]), 2,000 mg, IntraVENous, Q24H, Stan Raphael MD, Last Rate: 12.5 mL/hr at 01/14/23 1236, 2,000 mg at 01/14/23 1236    ADULT DIET; Dysphagia - Pureed    FL MODIFIED BARIUM SWALLOW W VIDEO   Final Result   Severe oral delay. No evidence of aspiration or laryngeal penetration. Please see separate speech pathology report for full discussion of findings   and recommendations. RECOMMENDATIONS:   Unavailable         CT HEAD WO CONTRAST   Final Result   1. No evidence of acute intracranial abnormality. 2. Stable cortical calcification in the right temporal lobe, likely related   to recent subarachnoid hemorrhage as discussed above. 3. Chronic white matter ischemic changes. Mild atrophy. XR CHEST PORTABLE   Final Result   1.  There is no acute cardiopulmonary disease Assessment:    Principal Problem:    Sepsis ([address]  Resolved Problems:    * No resolved hospital problems. *  Hyponatremia  Acute kidney injury  Uti -serratia and enterococcus    Plan:  Sodium better  Likely dc tomorrow  Change antibiotics to cefdinir      Diane Velasco MD  2:44 PM  1/14/2023    NOTE: This report was transcribed using voice recognition software.  Every effort was made to ensure accuracy; however, inadvertent transcription errors may be present

## 2023-01-14 NOTE — PROGRESS NOTES
Notified Dr. Barger Books that [facility] has been obtained for Christin Lutz.

## 2023-01-15 NOTE — PROGRESS NOTES
OCCUPATIONAL THERAPY TREATMENT NOTE     [address] [address] [address]:  Patient Name: Dahlia Driver  MRN: [id number]  : 1950  Room: [address] [place] [address]       Evaluating [address] [place] [address] [address][id number]     Referring Provider: Herber Ovalle MD  Specific Provider Orders/Date: OT eval and treat 23      Diagnosis: Metabolic encephalopathy [B29.45]  Complicated UTI (urinary tract infection) [N39.0]  Nursing home resident [Z59.3]  Sepsis [facility]) [A41.9]  Sepsis with acute renal failure, due to unspecified organism, unspecified acute renal failure type, unspecified whether septic shock present ([facility] [A41.9, R65.20, N17.9]   Pt admitted to hospital on 1/10/23 for AMS        Pertinent Medical History:  has a past medical history of Diverticulitis, GERD (gastroesophageal reflux disease), High cholesterol, Hypertension, and Rosacea.          Precautions:  Fall Risk, cognition, incontinent, andersen, TAPS, bed alarm     Assessment of current deficits    [x] Functional mobility         [x]ADLs           [x] Strength                  [x]Cognition    [x] Functional transfers       [x] IADLs         [x] Safety Awareness   [x]Endurance    [] Fine Coordination                      [x] Balance      [] Vision/perception   []Sensation      []Gross Motor Coordination          [] ROM           [] Delirium                   [] Motor Control      OT PLAN OF CARE   OT POC based on physician orders, patient diagnosis and results of clinical assessment     Frequency/Duration 1-3 days/wk for 2 weeks PRN   Specific OT Treatment Interventions to include:   * Instruction/training on adapted ADL techniques and AE recommendations to increase functional independence within precautions       * Training on energy conservation strategies, correct breathing pattern and techniques to improve independence/tolerance for self-care routine  * Functional transfer/mobility training/DME recommendations for increased independence, safety, and fall prevention  * Patient/Family education to increase follow through with safety techniques and functional independence  * Recommendation of environmental modifications for increased safety with functional transfers/mobility and ADLs  * Cognitive retraining/development of therapeutic activities to improve problem solving, judgement, memory, and attention for increased safety/participation in ADL/IADL tasks  * Therapeutic exercise to improve motor endurance, ROM, and functional strength for ADLs/functional transfers  * Therapeutic activities to facilitate/challenge dynamic balance, stand tolerance for increased safety and independence with ADLs  * Therapeutic activities to facilitate gross/fine motor skills for increased independence with ADLs  * Positioning to improve skin integrity, interaction with environment and functional independence        Recommended Adaptive Equipment: TBD      Home Living: Pt admitted from NH (per chart)     Prior Level of Function: unable to obtain from pt d/t cognition. Family/caregiver not present     Pain Level: Pt c/o no pain this session     Cognition: A&O: 1/4 (to self only); Follows 1 step directions inconsistently (increased difficulty initially and at end of session)  Confusion noted - cues provided to initiate, sequence and attend to tasks.   Limited verbal communication, flat affect           Memory:  poor           Sequencing:  poor           Problem solving:  poor           Judgement/safety:  poor             Functional Assessment:  AM-PAC Daily Activity Raw Score: 9/24    Initial Eval Status  Date: 1/13/23 Treatment Status  Date: 1/15/23 STGs = LTGs  Time frame: 10-14 days   Feeding NPO  n/t -  Will continue to assess   Grooming Maximal Assist  MAX A simulated seated EOB pt requiring assist to maintain sitting balance to complete dynamic activity  Minimal Assist    UB Dressing Maximal Assist  MAX A to [facility] gown  Minimal Assist    LB Dressing Dependent  Dep to [facility] socks  Moderate Assist    Bathing Dependent N/t Moderate Assist    Toileting Dependent  N/t Moderate Assist    Bed Mobility  Rolling: Max A  Supine to sit: Maximal Assist   Sit to supine: Maximal Assist  Supine<>sit MAX A   Pt requiring assist to bring B LE in/out of bed with trunk control v/c's for hand placement poor follow through  Supine to sit: Minimal Assist   Sit to supine: Minimal Assist    Functional Transfers NT  Deferred d/t noted fatigue as EOB activity progressed MAX A x2 sit<>stand from EOB with HHA  Moderate Assist    Functional Mobility NT N/t Moderate Assist    Balance Sitting:     Static:  Min A    Dynamic: Mod A  Standing: NT Sitting:  Static: MOD A posterior lean noted   Dynamic: MAX A   Standing: MAX A x2      Activity Tolerance Poor Fair-   Fair   Visual/  Perceptual Glasses: no                      Comments: Upon arrival pt supine in bed, agreeable to therapy session. Pt educated with regards to bed mobility, functional transfers, hand placement, static/dynamic sitting balance, upright posture, UE/LE dressing, importance of increased activity. At end of session pt supine in bed,  all lines and tubes intact, call light within reach. Pt has made fair-  progress towards set goals.    Continue with current plan of care      Treatment Time In:1015            Treatment Time Out: 0903                Treatment Charges: Mins Units   Ther Ex  [id number]     Manual Therapy [id number] [facility]     Thera Activities [id number]    ADL/Home Mgt 12731 12 1   Neuro Re-ed 24635     Group Therapy      Orthotic manage/training  72014     Non-Billable Time     Total Timed Treatment 18 118 [facility] RAUSCH/L [address]

## 2023-01-15 NOTE — PROGRESS NOTES
Subjective:    Chief complaint:    Denies new issues    Objective:    BP (!) 154/80   Pulse 76   Temp 96.8 °F (36 °C) (Temporal)   Resp 22   Ht 5' 6\" (1.676 m)   Wt 130 lb (59 kg)   SpO2 96%   BMI 20.98 kg/m²   General : Awake, confused without agitation or distress  Heart:  RRR, no murmurs, gallops, or rubs. Lungs:  CTA bilaterally, no wheeze, rales or rhonchi  Abd: bowel sounds present, nontender, nondistended, no masses  Extrem:  No clubbing, cyanosis, or edema    CBC:   Lab Results   Component Value Date/Time    WBC 11.2 01/10/2023 09:14 AM    RBC 3.52 01/10/2023 09:14 AM    HGB 10.4 01/10/2023 09:14 AM    HCT 33.2 01/10/2023 09:14 AM    MCV 94.3 01/10/2023 09:14 AM    MCH 29.5 01/10/2023 09:14 AM    MCHC 31.3 01/10/2023 09:14 AM    RDW 14.6 01/10/2023 09:14 AM     01/10/2023 09:14 AM    MPV 9.9 01/10/2023 09:14 AM     BMP:    Lab Results   Component Value Date/Time     01/15/2023 07:15 AM    K 3.4 01/15/2023 07:15 AM    K 5.4 12/27/2022 09:52 AM     01/15/2023 07:15 AM    CO2 18 01/15/2023 07:15 AM    BUN 19 01/15/2023 07:15 AM    LABALBU 3.0 01/10/2023 09:14 AM    LABALBU 4.2 06/28/2011 12:00 PM    CREATININE 0.8 01/15/2023 07:15 AM    CALCIUM 8.0 01/15/2023 07:15 AM    GFRAA >60 08/03/2022 09:39 AM    LABGLOM >60 01/15/2023 07:15 AM    GLUCOSE 93 01/15/2023 07:15 AM    GLUCOSE 90 06/28/2011 12:00 PM     PT/INR:    Lab Results   Component Value Date/Time    PROTIME 12.0 01/10/2023 11:45 AM    INR 1.1 01/10/2023 11:45 AM     Troponin:  No results found for: TROPONINI    No results for input(s): LABURIN in the last 72 hours. No results for input(s): BC in the last 72 hours. No results for input(s): June Cava in the last 72 hours.         Current Facility-Administered Medications:     cefdinir (OMNICEF) capsule 300 mg, 300 mg, Oral, 2 times per day, Diane Velasco MD, 300 mg at 01/15/23 0924    glucose chewable tablet 16 g, 4 tablet, Oral, PRN, Diane Velasco MD dextrose bolus 10% 125 mL, 125 mL, IntraVENous, PRN, Stopped at 01/13/23 0913 **OR** dextrose bolus 10% 250 mL, 250 mL, IntraVENous, PRN, Viktor Mccarthy MD    glucagon (rDNA) injection 1 mg, 1 mg, SubCUTAneous, PRN, Yaquelin Fernandez MD    dextrose 10 % infusion, , IntraVENous, Continuous PRN, Yaquelin Fernandez MD    potassium bicarb-citric acid (EFFER-K) effervescent tablet 40 mEq, 40 mEq, Oral, Daily, Yaquelin Fernandez MD, 40 mEq at 01/15/23 0925    0.45 % NaCl with KCl 20 mEq infusion, , IntraVENous, Continuous, Yaquelin Fernandez MD, Last Rate: 100 mL/hr at 01/15/23 0539, New Bag at 01/15/23 0539    sertraline (ZOLOFT) tablet 25 mg, 25 mg, Oral, Daily, Yaquelin Fernandez MD, 25 mg at 01/15/23 3519    aspirin chewable tablet 81 mg, 81 mg, Oral, Daily, Yaquelin Fernandez MD, 81 mg at 01/15/23 0924    magnesium oxide (MAG-OX) tablet 400 mg, 400 mg, Oral, Daily, Yaquelin Fernandez MD, 400 mg at 01/15/23 0924    sucralfate (CARAFATE) tablet 1 g, 1 g, Oral, BID, Yaquelin Fernandez MD, 1 g at 01/15/23 0832    pantoprazole (PROTONIX) tablet 40 mg, 40 mg, Oral, QAM AC, Yaquelin Fernandez MD, 40 mg at 01/15/23 0540    ADULT DIET; Dysphagia - Pureed    FL MODIFIED BARIUM SWALLOW W VIDEO   Final Result   Severe oral delay. No evidence of aspiration or laryngeal penetration. Please see separate speech pathology report for full discussion of findings   and recommendations. RECOMMENDATIONS:   Unavailable         CT HEAD WO CONTRAST   Final Result   1. No evidence of acute intracranial abnormality. 2. Stable cortical calcification in the right temporal lobe, likely related   to recent subarachnoid hemorrhage as discussed above. 3. Chronic white matter ischemic changes. Mild atrophy. XR CHEST PORTABLE   Final Result   1.  There is no acute cardiopulmonary disease             Assessment:    Principal Problem:    Sepsis ([address]  Resolved Problems:    * No resolved hospital problems. *  Hyponatremia  Acute kidney injury  Uti -serratia and enterococcus    Plan:  Sodium better  Dc ivf  Dc to [facility]  Change antibiotics to cefdinir      Daniel Dawn MD  10:59 AM  1/15/2023    NOTE: This report was transcribed using voice recognition software.  Every effort was made to ensure accuracy; however, inadvertent transcription errors may be present

## 2023-01-15 NOTE — CARE COORDINATION
Care Coordination:  DC noted, call to [place] from [place]. Nia to dc. Need negative covid. Covid completed, unit arranged for transport at 6 pm and I have notified facility.  Per previous SW, all paperwork completed and in place    Kayley

## 2023-01-15 NOTE — PROGRESS NOTES
Physical Therapy  Physical Therapy Initial Assessment     Name: Justyn Romero  : 1950  MRN: [id number]      Date of Service: 1/15/2023    Evaluating PT:  Angel Swan PT, DPT    Room #:  9609/5120-A  Diagnosis:  Metabolic encephalopathy [T99.29]  Complicated UTI (urinary tract infection) [N39.0]  Nursing home resident [Z59.3]  Sepsis ([facility] [A41.9]  Sepsis with acute renal failure, due to unspecified organism, unspecified acute renal failure type, unspecified whether septic shock present ([address] [A41.9, R65.20, N17.9]  PMHx/PSHx:  SAH, GERD, HLD  Procedure/Surgery:  N/A  Precautions:  fall risk, cognition, O2, bed alarm  Equipment Needs:  TBD    SUBJECTIVE:    Pt a poor historian. Per chart, pt admitted from [facility]. OBJECTIVE:   Initial Evaluation  Date: 1/15/23 Treatment Short Term/ Long Term   Goals   AM-PAC 6 Clicks      Was pt agreeable to Eval/treatment? yes     Does pt have pain? No pain     Bed Mobility  Rolling: max A  Supine to sit: max A  Sit to supine: max A  Scooting: max A  Rolling: min A  Supine to sit: min A  Sit to supine: min A  Scooting: min A   Transfers Sit to stand: max Ax2  Stand to sit: max Ax2  Stand pivot: NT  Sit to stand: mod A  Stand to sit: mod A  Stand pivot: mod A   Ambulation      Make ambulation goal as appropriate   Stair negotiation: ascended and descended  NT       Strength/ROM:   BLE grossly 2/5  BLE AROM limited by weakness and flexion contractures    Balance:   Static Sitting: mod A  Dynamic Sitting: max A  Static Standing: max Ax2  Dynamic Standing: NT    Pt is A & O x 1  Sensation:  Pt denies numbness and tingling to extremities  Edema:  unremarkable    Therapeutic Exercises:    Bed mobility: supine<>sit, cued for EOB positioning  Balance: sitting EOB ~7'  Transfers:  STSx1, cued for sequencing and postural correction  BLE A/PROM    Patient education  Pt educated on role of PT, importance of functional mobility during hospital stay, safety with functional mobility    Patient response to education:   Pt verbalized understanding Pt demonstrated skill Pt requires further education in this area   partial partial yes     ASSESSMENT:    Conditions Requiring Skilled Therapeutic Intervention:    [x]Decreased strength     [x]Decreased ROM  [x]Decreased functional mobility  [x]Decreased balance   [x]Decreased endurance   [x]Decreased posture  []Decreased sensation  []Decreased coordination   []Decreased vision  [x]Decreased safety awareness   []Increased pain       Comments:    Pt supine in bed upon entering, agreeable to participate. Pt requiring increased assistance to transfer to EOB, support of trunk and BLE provided. Pt sitting upright with poor static sitting balance. Pt was cued for postural correction and trunk control, however, pt demonstrated poor carryover. Pt was agreeable to attempt STS transfer. Pt was assisted into standing with arm in arm A, B knees blocked. Pt achieved partial stand with flexed posture. Pt stood for ~15\" and was transferred back to bed. Pt's SpO2 was WNL during session, HR increase to 110 bpm after stand. Pt was assisted back to bed and positioned for comfort. All needs met and call bell in reach prior to exiting.     Treatment:  Patient practiced and was instructed in the following treatment:    Bed mobility training - pt given verbal and tactile cues to facilitate proper sequencing and safety during rolling and supine>sit as well as provided with physical assistance to complete task   Sitting EOB for >8 minutes for upright tolerance, postural awareness and BLE ROM  STS transfer training - pt educated on proper hand and foot placement, safety and sequencing, and use of verbal and tactile cues to safely complete sit<>stand transfers with physical assistance to complete task safely   Skilled positioning - Pt placed in the chair position with pillows utilized to facilitate upright posture, joint and skin integrity, and interaction with environment. Pt's/ family goals   1. [facility]ANI    Prognosis is fair for reaching above PT goals. Patient and or family understand(s) diagnosis, prognosis, and plan of care. yes    PHYSICAL THERAPY PLAN OF CARE:    PT POC is established based on physician order and patient diagnosis     Referring provider/PT Order:  Javeir Diehl MD  Diagnosis:  Metabolic encephalopathy [H81.57]  Complicated UTI (urinary tract infection) [N39.0]  Nursing home resident [Z59.3]  Sepsis ([facility] [A41.9]  Sepsis with acute renal failure, due to unspecified organism, unspecified acute renal failure type, unspecified whether septic shock present ([facility] [A41.9, R65.20, N17.9]  Specific instructions for next treatment:  Progress as tolerated    Current Treatment Recommendations:     [x] Strengthening to improve independence with functional mobility   [x] ROM to improve independence with functional mobility   [x] Balance Training to improve static/dynamic balance and to reduce fall risk  [x] Endurance Training to improve activity tolerance during functional mobility   [x] Transfer Training to improve safety and independence with all functional transfers   [] Gait Training to improve gait mechanics, endurance and assess need for appropriate assistive device  [] Stair Training in preparation for safe discharge home and/or into the community   [x] Positioning to prevent skin breakdown and contractures  [x] Safety and Education Training   [x] Patient/Caregiver Education   [] HEP  [] Other     PT long term treatment goals are located in above grid    Frequency of treatments: 2-5x/week x 1-2 weeks.     Time in  1015  Time out  1035    Total Treatment Time  10 minutes     Evaluation Time includes thorough review of current medical information, gathering information on past medical history/social history and prior level of function, completion of standardized testing/informal observation of tasks, assessment of data and education on plan of care and goals.     CPT codes:  [x] Low Complexity PT evaluation 89397  [] Moderate Complexity PT evaluation 54831  [] High Complexity PT evaluation 24798  [] PT Re-evaluation 29199  [] Gait training 95317 -- minutes  [] Manual therapy [id number] -- minutes  [x] Therapeutic activities 37162 10 minutes  [] Therapeutic exercises 89792 -- minutes  [] Neuromuscular reeducation 07335 -- minutes     Arthur Farfan, PT, DPT  [id number]
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
## 2023-01-17 NOTE — PROGRESS NOTES
01/17/23 1609   Encounter Summary   Encounter Overview/Reason  Spiritual/Emotional Needs   Service Provided For: Family   Referral/Consult From: Multi-disciplinary team   Support System Spouse; Children   Last Encounter  01/17/23  (DL)   Complexity of Encounter High   Grief, Loss, and Adjustments   Type Death   Assessment/Intervention/Outcome   Assessment Coping;Sad; Shock   Intervention Active listening;Discussed illness injury and its impact; Discussed meaning/purpose;Explored/Affirmed feelings, thoughts, concerns;Nurtured Hope;Life review/Legacy;Prayer (assurance of)/[place]   Outcome Comfort;Engaged in conversation;Expressed feelings, needs, and concerns;Expressed Gratitude     Contacted Faheem Otto patient's son at work who then came to the hospital to be with his mom. Much emotional and prayer support provided.

## 2023-01-17 NOTE — ED NOTES
1400 pt arrived for outpatient ct scan   1450 code blue called, CPR in progress upon ED staff arrival   1453 pulse check-asystole  Multiple attempts at IV access without success  No medications given, pts spouse requested to stop all efforts  1456 TOD-pronounced by Dr. Blackburn Bolus  Total CPR time 6 min     Jose Tirado, RN  01/17/23 7246

## 2023-01-17 NOTE — ED NOTES
Pt being transported to [facility] at this time.       Na Robin RN  01/17/23 6234

## (undated) DEVICE — CONTAINER SPEC 60ML PH 7NEUTRAL BUFF FRMLN RDY TO USE

## (undated) DEVICE — DEFENDO AIR WATER SUCTION AND BIOPSY VALVE KIT FOR  OLYMPUS: Brand: DEFENDO AIR/WATER/SUCTION AND BIOPSY VALVE

## (undated) DEVICE — BLOCK BITE 60FR RUBBER ADLT DENTAL

## (undated) DEVICE — GAUZE,SPONGE,POST-OP,4X3,STRL,LF: Brand: MEDLINE

## (undated) DEVICE — CANNULA NSL ORAL AD FOR CAPNOFLEX CO2 O2 AIRLFE

## (undated) DEVICE — SINGLE-USE BIOPSY FORCEPS: Brand: RADIAL JAW 4